# Patient Record
Sex: FEMALE | Race: WHITE | Employment: OTHER | URBAN - METROPOLITAN AREA
[De-identification: names, ages, dates, MRNs, and addresses within clinical notes are randomized per-mention and may not be internally consistent; named-entity substitution may affect disease eponyms.]

---

## 2021-01-06 ENCOUNTER — HOSPITAL ENCOUNTER (EMERGENCY)
Age: 51
Discharge: HOME | End: 2021-01-06
Attending: EMERGENCY MEDICINE
Payer: MEDICAID

## 2021-01-06 VITALS
RESPIRATION RATE: 18 BRPM | HEART RATE: 92 BPM | OXYGEN SATURATION: 98 % | WEIGHT: 293 LBS | TEMPERATURE: 97.7 F | DIASTOLIC BLOOD PRESSURE: 96 MMHG | BODY MASS INDEX: 53.92 KG/M2 | HEIGHT: 62 IN | SYSTOLIC BLOOD PRESSURE: 162 MMHG

## 2021-01-06 DIAGNOSIS — Z20.822 CLOSE EXPOSURE TO COVID-19 VIRUS: Primary | ICD-10-CM

## 2021-01-06 PROCEDURE — 99282 EMERGENCY DEPT VISIT SF MDM: CPT

## 2021-01-06 PROCEDURE — 87635 SARS-COV-2 COVID-19 AMP PRB: CPT

## 2021-01-07 LAB
SARS COV-2, XPGCVT: NEGATIVE
SOURCE, COVRS: NORMAL

## 2021-01-07 NOTE — ED PROVIDER NOTES
Patient is a 48-year-old female with a history of multiple sclerosis. She comes to the ER tonight with her daughter. They are worried about possible Covid infection. Patient states she has had some malaise and fatigue. She was recently around her  who tested positive. She has no cough or shortness of breath. No fever. The history is provided by the patient. Fatigue  This is a new problem. The current episode started 2 days ago. The problem has not changed since onset. There was no focality noted. Pertinent negatives include no focal weakness and no slurred speech. There has been no fever. Pertinent negatives include no shortness of breath, no chest pain, no vomiting, no headaches, no nausea and no bladder incontinence. Associated medical issues do not include trauma, seizures or CVA. No past medical history on file. No past surgical history on file. No family history on file.     Social History     Socioeconomic History    Marital status:      Spouse name: Not on file    Number of children: Not on file    Years of education: Not on file    Highest education level: Not on file   Occupational History    Not on file   Social Needs    Financial resource strain: Not on file    Food insecurity     Worry: Not on file     Inability: Not on file    Transportation needs     Medical: Not on file     Non-medical: Not on file   Tobacco Use    Smoking status: Not on file   Substance and Sexual Activity    Alcohol use: Not on file    Drug use: Not on file    Sexual activity: Not on file   Lifestyle    Physical activity     Days per week: Not on file     Minutes per session: Not on file    Stress: Not on file   Relationships    Social connections     Talks on phone: Not on file     Gets together: Not on file     Attends Faith service: Not on file     Active member of club or organization: Not on file     Attends meetings of clubs or organizations: Not on file     Relationship status: Not on file    Intimate partner violence     Fear of current or ex partner: Not on file     Emotionally abused: Not on file     Physically abused: Not on file     Forced sexual activity: Not on file   Other Topics Concern    Not on file   Social History Narrative    Not on file         ALLERGIES: Patient has no known allergies. Review of Systems   Constitutional: Positive for fatigue. Negative for chills and fever. HENT: Negative for congestion, rhinorrhea and sore throat. Eyes: Negative for pain, discharge and visual disturbance. Respiratory: Negative for cough and shortness of breath. Cardiovascular: Negative for chest pain and palpitations. Gastrointestinal: Negative for abdominal pain, diarrhea, nausea and vomiting. Endocrine: Negative for polydipsia and polyuria. Genitourinary: Negative for bladder incontinence, dysuria, frequency and urgency. Musculoskeletal: Negative for back pain and neck pain. Skin: Negative for rash. Neurological: Negative for focal weakness, seizures, syncope, weakness and headaches. Hematological: Negative. Vitals:    01/06/21 2148   BP: (!) 162/96   Pulse: 92   Resp: 18   Temp: 97.7 °F (36.5 °C)   SpO2: 98%   Weight: 134.3 kg (296 lb)   Height: 5' 2\" (1.575 m)            Physical Exam  Vitals signs and nursing note reviewed. Constitutional:       Appearance: Normal appearance. She is well-developed. HENT:      Head: Normocephalic and atraumatic. Mouth/Throat:      Mouth: Mucous membranes are moist.      Pharynx: Oropharynx is clear. No oropharyngeal exudate. Neck:      Musculoskeletal: Normal range of motion and neck supple. Cardiovascular:      Rate and Rhythm: Normal rate and regular rhythm. Pulmonary:      Effort: Pulmonary effort is normal.      Breath sounds: Normal breath sounds. Abdominal:      Palpations: Abdomen is soft. Tenderness: There is no abdominal tenderness. There is no guarding or rebound. Musculoskeletal: Normal range of motion. General: No tenderness or deformity. Lymphadenopathy:      Cervical: No cervical adenopathy. Skin:     General: Skin is warm and dry. Capillary Refill: Capillary refill takes less than 2 seconds. Findings: No rash. Neurological:      Mental Status: She is alert and oriented to person, place, and time. GCS: GCS eye subscore is 4. GCS verbal subscore is 5. GCS motor subscore is 6. Cranial Nerves: No cranial nerve deficit. Sensory: No sensory deficit. Comments: Weakness from her MS but no acute changes. MDM  Number of Diagnoses or Management Options  Diagnosis management comments: I wore appropriate PPE throughout this patient's ED visit. Kelvin Woodson MD, 11:10 PM    Well-appearing with normal vital signs. No need for admission. We will swab for Covid because of her recent exposure. Voice dictation software was used during the making of this note. This software is not perfect and grammatical and other typographical errors may be present. This note has been proofread, but may still contain errors.   Kelvin Woodson MD; 1/6/2021 @11:10 PM   ===================================================================         Amount and/or Complexity of Data Reviewed  Clinical lab tests: ordered    Risk of Complications, Morbidity, and/or Mortality  Presenting problems: low  Diagnostic procedures: low  Management options: low    Patient Progress  Patient progress: stable         Procedures

## 2021-01-07 NOTE — ED TRIAGE NOTES
Presents to Ed with c/o of headache and bodyaches for approx. 3 days. Pt states that she has been exposed to covid within the last  Week. Mask present on arrival.

## 2021-01-07 NOTE — DISCHARGE INSTRUCTIONS
Use Tylenol for pain and fever. Drink plenty of fluids. Follow-up with your doctor.   You will be called with the results of your Covid swab.    ============

## 2021-01-07 NOTE — ED NOTES
I have reviewed discharge instructions with the patient. The patient verbalized understanding. Patient left ED via Discharge Method: ambulatory to Home with self    Opportunity for questions and clarification provided. Patient given 0 scripts. To continue your aftercare when you leave the hospital, you may receive an automated call from our care team to check in on how you are doing. This is a free service and part of our promise to provide the best care and service to meet your aftercare needs.  If you have questions, or wish to unsubscribe from this service please call [phone]. Thank you for Choosing our [facility] Emergency Department.

## 2021-09-21 ENCOUNTER — APPOINTMENT (OUTPATIENT)
Dept: GENERAL RADIOLOGY | Age: 51
End: 2021-09-21
Attending: STUDENT IN AN ORGANIZED HEALTH CARE EDUCATION/TRAINING PROGRAM
Payer: MEDICAID

## 2021-09-21 ENCOUNTER — HOSPITAL ENCOUNTER (EMERGENCY)
Age: 51
Discharge: HOME | End: 2021-09-21
Attending: EMERGENCY MEDICINE
Payer: MEDICAID

## 2021-09-21 VITALS
TEMPERATURE: 97.3 F | HEART RATE: 78 BPM | WEIGHT: 180 LBS | RESPIRATION RATE: 18 BRPM | HEIGHT: 67 IN | OXYGEN SATURATION: 97 % | DIASTOLIC BLOOD PRESSURE: 82 MMHG | SYSTOLIC BLOOD PRESSURE: 126 MMHG | BODY MASS INDEX: 28.25 KG/M2

## 2021-09-21 DIAGNOSIS — M25.561 ACUTE PAIN OF RIGHT KNEE: Primary | ICD-10-CM

## 2021-09-21 PROCEDURE — 99283 EMERGENCY DEPT VISIT LOW MDM: CPT

## 2021-09-21 PROCEDURE — 73562 X-RAY EXAM OF KNEE 3: CPT

## 2021-09-21 NOTE — ED TRIAGE NOTES
Arrives with face mask in place. Ambulatory into triage with walker. Reports right knee pain r/t injury. States fells Sunday landing on right knee. Hx MS, ambulates with walker. Denies hitting head or loss of consciousness. Denies blood thinners.

## 2021-09-22 NOTE — ED NOTES
I have reviewed discharge instructions with the patient. The patient verbalized understanding. Patient left ED via Discharge Method: ambulatory to Home with self. Opportunity for questions and clarification provided. Patient given 0 scripts. To continue your aftercare when you leave the hospital, you may receive an automated call from our care team to check in on how you are doing. This is a free service and part of our promise to provide the best care and service to meet your aftercare needs.  If you have questions, or wish to unsubscribe from this service please call [phone]. Thank you for Choosing our [facility] Emergency Department.

## 2021-09-22 NOTE — ED PROVIDER NOTES
50-year-old female with past medical history significant for MS, ambulates with a walker presents to the emergency room chief complaint of right knee pain after she had a fall 2 days ago. States she was walking around her house when she lost her balance, landing on her right knee. She states she typically has balance issues and this is related to her MS. She has had right knee pain and swelling since the event. Has not use any medications at home for this. Denies any other symptoms. No past medical history on file. No past surgical history on file. No family history on file. Social History     Socioeconomic History    Marital status:      Spouse name: Not on file    Number of children: Not on file    Years of education: Not on file    Highest education level: Not on file   Occupational History    Not on file   Tobacco Use    Smoking status: Not on file   Substance and Sexual Activity    Alcohol use: Not on file    Drug use: Not on file    Sexual activity: Not on file   Other Topics Concern    Not on file   Social History Narrative    Not on file     Social Determinants of Health     Financial Resource Strain:     Difficulty of Paying Living Expenses:    Food Insecurity:     Worried About Running Out of Food in the Last Year:     [address] in the Last Year:    Transportation Needs:     Lack of Transportation (Medical):      Lack of Transportation (Non-Medical):    Physical Activity:     Days of Exercise per Week:     Minutes of Exercise per Session:    Stress:     Feeling of Stress :    Social Connections:     Frequency of Communication with Friends and Family:     Frequency of Social Gatherings with Friends and Family:     Attends Orthodoxy Services:     Active Member of Clubs or Organizations:     Attends Club or Organization Meetings:     Marital Status:    Intimate Partner Violence:     Fear of Current or Ex-Partner:     Emotionally Abused:     Physically Abused:     Sexually Abused: ALLERGIES: Patient has no known allergies. Review of Systems   Constitutional: Negative for chills and fever. HENT: Negative for facial swelling. Respiratory: Negative for chest tightness and shortness of breath. Cardiovascular: Negative for chest pain. Gastrointestinal: Negative for abdominal pain, nausea and vomiting. Musculoskeletal: Positive for arthralgias. Negative for myalgias. Neurological: Negative for headaches. Psychiatric/Behavioral: Negative for confusion. All other systems reviewed and are negative. Vitals:    09/21/21 1934   BP: 117/75   Pulse: 80   Resp: 16   Temp: 97.3 °F (36.3 °C)   SpO2: 97%   Weight: 81.6 kg (180 lb)   Height: 5' 7\" (1.702 m)            Physical Exam  Vitals and nursing note reviewed. Constitutional:       Appearance: Normal appearance. HENT:      Head: Normocephalic and atraumatic. Mouth/Throat:      Mouth: Mucous membranes are moist.   Eyes:      Pupils: Pupils are equal, round, and reactive to light. Cardiovascular:      Rate and Rhythm: Normal rate and regular rhythm. Pulmonary:      Effort: No respiratory distress. Breath sounds: Normal breath sounds. Abdominal:      Palpations: Abdomen is soft. Tenderness: There is no abdominal tenderness. There is no guarding. Musculoskeletal:      Comments: Mild swelling to the right knee. There is no bony tenderness or crepitus on palpation of the joint. Skin:     General: Skin is warm and dry. Neurological:      Mental Status: She is alert and oriented to person, place, and time. Mental status is at baseline. Psychiatric:         Mood and Affect: Mood normal.          MDM  Number of Diagnoses or Management Options  Diagnosis management comments: 58-year-old female who presents with chief complaint of right knee pain after she had a fall Sunday evening, 2 days ago.   She fell directly onto her right knee, x-ray of the affected region is unremarkable without any bony abnormality, there is no joint effusion present. Advised patient she needs use ice, ibuprofen for pain. She understands indications for which to return to emergency room such as worrisome or worsening symptoms. Voice dictation software was used during the making of this note. This software is not perfect and grammatical and other typographical errors may be present. This note has been proofread, but may still contain errors.    Timmy Chandler PA-C        Amount and/or Complexity of Data Reviewed  Tests in the radiology section of CPT®: ordered and reviewed  Independent visualization of images, tracings, or specimens: yes    Risk of Complications, Morbidity, and/or Mortality  Presenting problems: low  Diagnostic procedures: low  Management options: low    Patient Progress  Patient progress: improved         Procedures

## 2022-06-06 ENCOUNTER — HOSPITAL ENCOUNTER (EMERGENCY)
Age: 52
Discharge: HOME | End: 2022-06-07
Attending: EMERGENCY MEDICINE
Payer: MEDICAID

## 2022-06-06 VITALS
BODY MASS INDEX: 31.39 KG/M2 | HEIGHT: 67 IN | SYSTOLIC BLOOD PRESSURE: 131 MMHG | TEMPERATURE: 98.3 F | WEIGHT: 200 LBS | OXYGEN SATURATION: 96 % | DIASTOLIC BLOOD PRESSURE: 104 MMHG | RESPIRATION RATE: 18 BRPM | HEART RATE: 80 BPM

## 2022-06-06 DIAGNOSIS — R20.2 NUMBNESS AND TINGLING OF BOTH LOWER EXTREMITIES: Primary | ICD-10-CM

## 2022-06-06 DIAGNOSIS — G35 HISTORY OF MULTIPLE SCLEROSIS (HCC): ICD-10-CM

## 2022-06-06 DIAGNOSIS — R20.0 NUMBNESS AND TINGLING OF BOTH LOWER EXTREMITIES: Primary | ICD-10-CM

## 2022-06-06 LAB
BASOPHILS # BLD: 0.1 K/UL (ref 0–0.2)
BASOPHILS NFR BLD: 1 % (ref 0–2)
DIFFERENTIAL METHOD BLD: NORMAL
EOSINOPHIL # BLD: 0.2 K/UL (ref 0–0.8)
EOSINOPHIL NFR BLD: 3 % (ref 0.5–7.8)
ERYTHROCYTE [DISTWIDTH] IN BLOOD BY AUTOMATED COUNT: 13.6 % (ref 11.9–14.6)
HCT VFR BLD AUTO: 41.3 % (ref 35.8–46.3)
HGB BLD-MCNC: 13.3 G/DL (ref 11.7–15.4)
IMM GRANULOCYTES # BLD AUTO: 0 K/UL (ref 0–0.5)
IMM GRANULOCYTES NFR BLD AUTO: 0 % (ref 0–5)
LYMPHOCYTES # BLD: 2 K/UL (ref 0.5–4.6)
LYMPHOCYTES NFR BLD: 30 % (ref 13–44)
MCH RBC QN AUTO: 26.9 PG (ref 26.1–32.9)
MCHC RBC AUTO-ENTMCNC: 32.2 G/DL (ref 31.4–35)
MCV RBC AUTO: 83.4 FL (ref 79.6–97.8)
MONOCYTES # BLD: 0.6 K/UL (ref 0.1–1.3)
MONOCYTES NFR BLD: 8 % (ref 4–12)
NEUTS SEG # BLD: 3.9 K/UL (ref 1.7–8.2)
NEUTS SEG NFR BLD: 58 % (ref 43–78)
NRBC # BLD: 0 K/UL (ref 0–0.2)
PLATELET # BLD AUTO: 264 K/UL (ref 150–450)
PMV BLD AUTO: 9.6 FL (ref 9.4–12.3)
RBC # BLD AUTO: 4.95 M/UL (ref 4.05–5.2)
WBC # BLD AUTO: 6.7 K/UL (ref 4.3–11.1)

## 2022-06-06 PROCEDURE — 99284 EMERGENCY DEPT VISIT MOD MDM: CPT

## 2022-06-06 PROCEDURE — 85025 COMPLETE CBC W/AUTO DIFF WBC: CPT

## 2022-06-06 PROCEDURE — 80053 COMPREHEN METABOLIC PANEL: CPT

## 2022-06-06 PROCEDURE — 6360000002 HC RX W HCPCS: Performed by: EMERGENCY MEDICINE

## 2022-06-06 PROCEDURE — 2580000003 HC RX 258: Performed by: EMERGENCY MEDICINE

## 2022-06-06 PROCEDURE — 96365 THER/PROPH/DIAG IV INF INIT: CPT

## 2022-06-06 RX ORDER — METHYLPREDNISOLONE 32 MG/1
32 TABLET ORAL DAILY
Qty: 7 TABLET | Refills: 0 | Status: SHIPPED | OUTPATIENT
Start: 2022-06-06 | End: 2022-06-13

## 2022-06-06 RX ADMIN — SODIUM CHLORIDE 1000 MG: 9 INJECTION, SOLUTION INTRAVENOUS at 20:58

## 2022-06-06 ASSESSMENT — ENCOUNTER SYMPTOMS
CHEST TIGHTNESS: 0
VOICE CHANGE: 0
CHOKING: 0
VOMITING: 0
BACK PAIN: 0
COLOR CHANGE: 0
NAUSEA: 0
PHOTOPHOBIA: 0

## 2022-06-06 ASSESSMENT — PAIN SCALES - GENERAL: PAINLEVEL_OUTOF10: 10

## 2022-06-06 ASSESSMENT — PAIN DESCRIPTION - LOCATION: LOCATION: LEG

## 2022-06-06 ASSESSMENT — PAIN DESCRIPTION - ORIENTATION: ORIENTATION: LEFT

## 2022-06-06 ASSESSMENT — LIFESTYLE VARIABLES: HOW OFTEN DO YOU HAVE A DRINK CONTAINING ALCOHOL: NEVER

## 2022-06-06 ASSESSMENT — PAIN DESCRIPTION - DESCRIPTORS: DESCRIPTORS: THROBBING

## 2022-06-06 NOTE — ED TRIAGE NOTES
Pt arrives ambulatory to triage. Pt states she was bit by a spider on back of left leg and feels numb all over since then. Started Wednesday. NAD. Masked.

## 2022-06-07 NOTE — ED PROVIDER NOTES
[facility] Emergency Department Provider Note                     PCP:                No primary care provider on file. Age: 47 y.o. Sex: female         No diagnosis found. [unfilled]     [facility]  Number of Diagnoses or Management Options  Diagnosis management comments: Small area of erythema with possibly developing cellulitis. I feel her numbness and weakness are more like related to multiple sclerosis flare      Orders Placed This Encounter   Procedures    CBC with Auto Differential    Comprehensive Metabolic Panel    Insert peripheral IV        Arlin Vigil is a 47 y.o. female who presents to the Emergency Department with chief complaint of    Chief Complaint   Patient presents with    Numbness      Patient presents to the ER with family concerns over insect bite as well as numbness. Patient states she sustained a possible spider bite to her left leg a couple days ago. Was seen at outside ER and placed on antibiotics. Where she reports she has had increased numbness to left leg as well as right leg. States she has had issues with her balance and \"feeling weakness as well. Denies any headache. History of multiple sclerosis, states she has not been able to get in with her neurologist.    The history is provided by the patient. Fatigue  Severity:  Moderate  Timing:  Constant  Context: recent infection    Context: not alcohol use, not dehydration and not urinary tract infection    Relieved by:  Nothing  Worsened by:  Nothing  Associated symptoms: difficulty walking    Associated symptoms: no chest pain, no dizziness, no drooling, no dysphagia, no fever, no lethargy, no myalgias, no nausea, no stroke symptoms, no urgency and no vomiting        Review of Systems   Constitutional: Positive for fatigue. Negative for fever. HENT: Negative for drooling and voice change. Eyes: Negative for photophobia and visual disturbance. Respiratory: Negative for choking and chest tightness. Cardiovascular: Negative for chest pain and leg swelling. Gastrointestinal: Negative for dysphagia, nausea and vomiting. Endocrine: Negative for polyphagia and polyuria. Genitourinary: Negative for urgency. Musculoskeletal: Negative for back pain, gait problem and myalgias. Skin: Negative for color change and pallor. Neurological: Positive for weakness. Negative for dizziness and tremors. Hematological: Negative for adenopathy. Does not bruise/bleed easily. Psychiatric/Behavioral: Negative for behavioral problems and confusion. All other systems reviewed and are negative. All other systems reviewed and are negative. @[email or web address]     @[email or web address]    @[email or web address]        Social Connections:     Frequency of Communication with Friends and Family: Not on file    Frequency of Social Gatherings with Friends and Family: Not on file    Attends Restorationism Services: Not on file    Active Member of Clubs or Organizations: Not on file    Attends Club or Organization Meetings: Not on file    Marital Status: Not on file        No Known Allergies     Vitals signs and nursing note reviewed. Patient Vitals for the past 4 hrs:   Temp Pulse Resp BP SpO2   06/06/22 2016 -- -- -- -- 96 %   06/06/22 2015 -- -- -- (!) 131/104 97 %   06/06/22 1830 98.3 °F (36.8 °C) 80 18 (!) 126/90 96 %          Physical Exam  Vitals and nursing note reviewed. Constitutional:       General: She is not in acute distress. Appearance: Normal appearance. She is not ill-appearing. HENT:      Head: Normocephalic and atraumatic. Right Ear: External ear normal.      Left Ear: External ear normal.   Eyes:      Extraocular Movements: Extraocular movements intact. Pupils: Pupils are equal, round, and reactive to light. Cardiovascular:      Rate and Rhythm: Normal rate and regular rhythm. Pulses: Normal pulses. Heart sounds: Normal heart sounds.    Pulmonary:      Effort: Pulmonary effort is normal. No respiratory distress. Breath sounds: Normal breath sounds. No stridor. Abdominal:      General: Abdomen is flat. There is no distension. Palpations: There is no mass. Musculoskeletal:         General: No swelling or tenderness. Normal range of motion. Cervical back: Normal range of motion and neck supple. Legs:    Skin:     General: Skin is warm. Capillary Refill: Capillary refill takes less than 2 seconds. Coloration: Skin is not jaundiced or pale. Findings: Erythema present. No bruising. Neurological:      General: No focal deficit present. Mental Status: She is alert and oriented to person, place, and time. Cranial Nerves: No cranial nerve deficit. Sensory: No sensory deficit. Psychiatric:         Mood and Affect: Mood normal.         Behavior: Behavior normal.              Procedures    [unfilled]     No orders to display         Voice dictation software was used during the making of this note. This software is not perfect and grammatical and other typographical errors may be present. This note has not been completely proofread for errors.         Bailee Nobles MD  06/06/22 0272

## 2022-06-12 LAB
ALBUMIN SERPL-MCNC: 3.8 G/DL (ref 3.5–5)
ALBUMIN/GLOB SERPL: 1.2 {RATIO} (ref 1.2–3.5)
ALP SERPL-CCNC: 72 U/L (ref 50–136)
ALT SERPL-CCNC: 22 U/L (ref 12–65)
ANION GAP SERPL CALC-SCNC: 10 MMOL/L (ref 7–16)
AST SERPL-CCNC: 21 U/L (ref 15–37)
BILIRUB SERPL-MCNC: 0.5 MG/DL (ref 0.2–1.1)
BUN SERPL-MCNC: 8 MG/DL (ref 6–23)
CALCIUM SERPL-MCNC: 9.4 MG/DL (ref 8.3–10.4)
CHLORIDE SERPL-SCNC: 110 MMOL/L (ref 98–107)
CO2 SERPL-SCNC: 24 MMOL/L (ref 21–32)
CREAT SERPL-MCNC: 0.86 MG/DL (ref 0.6–1)
GLOBULIN SER CALC-MCNC: 3.2 G/DL (ref 2.3–3.5)
GLUCOSE SERPL-MCNC: 104 MG/DL (ref 65–100)
POTASSIUM SERPL-SCNC: 3.9 MMOL/L (ref 3.5–5.1)
PROT SERPL-MCNC: 7 G/DL (ref 6.3–8.2)
SODIUM SERPL-SCNC: 144 MMOL/L (ref 136–145)

## 2024-01-21 ENCOUNTER — HOSPITAL ENCOUNTER (EMERGENCY)
Age: 54
Discharge: HOME | End: 2024-01-21
Attending: EMERGENCY MEDICINE
Payer: MEDICAID

## 2024-01-21 ENCOUNTER — APPOINTMENT (OUTPATIENT)
Dept: GENERAL RADIOLOGY | Age: 54
End: 2024-01-21
Payer: MEDICAID

## 2024-01-21 ENCOUNTER — APPOINTMENT (OUTPATIENT)
Dept: CT IMAGING | Age: 54
End: 2024-01-21
Payer: MEDICAID

## 2024-01-21 VITALS
RESPIRATION RATE: 18 BRPM | HEIGHT: 67 IN | HEART RATE: 78 BPM | BODY MASS INDEX: 31.39 KG/M2 | WEIGHT: 200 LBS | DIASTOLIC BLOOD PRESSURE: 81 MMHG | OXYGEN SATURATION: 98 % | SYSTOLIC BLOOD PRESSURE: 139 MMHG | TEMPERATURE: 98.2 F

## 2024-01-21 DIAGNOSIS — R20.0 NUMBNESS: Primary | ICD-10-CM

## 2024-01-21 DIAGNOSIS — G35 MULTIPLE SCLEROSIS (HCC): ICD-10-CM

## 2024-01-21 DIAGNOSIS — N39.0 URINARY TRACT INFECTION WITHOUT HEMATURIA, SITE UNSPECIFIED: ICD-10-CM

## 2024-01-21 LAB
ALBUMIN SERPL-MCNC: 3.5 G/DL (ref 3.5–5)
ALBUMIN/GLOB SERPL: 1.2 (ref 0.4–1.6)
ALP SERPL-CCNC: 79 U/L (ref 50–136)
ALT SERPL-CCNC: 20 U/L (ref 12–65)
ANION GAP SERPL CALC-SCNC: 6 MMOL/L (ref 2–11)
APPEARANCE UR: CLEAR
AST SERPL-CCNC: 14 U/L (ref 15–37)
BACTERIA URNS QL MICRO: NEGATIVE /HPF
BASOPHILS # BLD: 0.1 K/UL (ref 0–0.2)
BASOPHILS NFR BLD: 1 % (ref 0–2)
BILIRUB SERPL-MCNC: 1 MG/DL (ref 0.2–1.1)
BILIRUB UR QL: NEGATIVE
BUN SERPL-MCNC: 13 MG/DL (ref 6–23)
CALCIUM SERPL-MCNC: 8.7 MG/DL (ref 8.3–10.4)
CASTS URNS QL MICRO: ABNORMAL /LPF
CHLORIDE SERPL-SCNC: 110 MMOL/L (ref 103–113)
CO2 SERPL-SCNC: 23 MMOL/L (ref 21–32)
COLOR UR: ABNORMAL
CREAT SERPL-MCNC: 1 MG/DL (ref 0.6–1)
DIFFERENTIAL METHOD BLD: ABNORMAL
EKG ATRIAL RATE: 80 BPM
EKG DIAGNOSIS: NORMAL
EKG P AXIS: 70 DEGREES
EKG P-R INTERVAL: 140 MS
EKG Q-T INTERVAL: 380 MS
EKG QRS DURATION: 87 MS
EKG QTC CALCULATION (BAZETT): 439 MS
EKG R AXIS: -74 DEGREES
EKG T AXIS: 24 DEGREES
EKG VENTRICULAR RATE: 80 BPM
EOSINOPHIL # BLD: 0.1 K/UL (ref 0–0.8)
EOSINOPHIL NFR BLD: 2 % (ref 0.5–7.8)
EPI CELLS #/AREA URNS HPF: ABNORMAL /HPF
ERYTHROCYTE [DISTWIDTH] IN BLOOD BY AUTOMATED COUNT: 13.2 % (ref 11.9–14.6)
GLOBULIN SER CALC-MCNC: 2.9 G/DL (ref 2.8–4.5)
GLUCOSE SERPL-MCNC: 106 MG/DL (ref 65–100)
GLUCOSE UR STRIP.AUTO-MCNC: NEGATIVE MG/DL
HCT VFR BLD AUTO: 44.4 % (ref 35.8–46.3)
HGB BLD-MCNC: 14.5 G/DL (ref 11.7–15.4)
HGB UR QL STRIP: NEGATIVE
IMM GRANULOCYTES # BLD AUTO: 0 K/UL (ref 0–0.5)
IMM GRANULOCYTES NFR BLD AUTO: 0 % (ref 0–5)
KETONES UR QL STRIP.AUTO: NEGATIVE MG/DL
LEUKOCYTE ESTERASE UR QL STRIP.AUTO: ABNORMAL
LYMPHOCYTES # BLD: 1.3 K/UL (ref 0.5–4.6)
LYMPHOCYTES NFR BLD: 19 % (ref 13–44)
MAGNESIUM SERPL-MCNC: 2.1 MG/DL (ref 1.8–2.4)
MCH RBC QN AUTO: 27.5 PG (ref 26.1–32.9)
MCHC RBC AUTO-ENTMCNC: 32.7 G/DL (ref 31.4–35)
MCV RBC AUTO: 84.3 FL (ref 82–102)
MONOCYTES # BLD: 0.6 K/UL (ref 0.1–1.3)
MONOCYTES NFR BLD: 9 % (ref 4–12)
NEUTS SEG # BLD: 4.5 K/UL (ref 1.7–8.2)
NEUTS SEG NFR BLD: 69 % (ref 43–78)
NITRITE UR QL STRIP.AUTO: NEGATIVE
NRBC # BLD: 0 K/UL (ref 0–0.2)
PH UR STRIP: 6 (ref 5–9)
PLATELET # BLD AUTO: 221 K/UL (ref 150–450)
PMV BLD AUTO: 9.2 FL (ref 9.4–12.3)
POTASSIUM SERPL-SCNC: 3.9 MMOL/L (ref 3.5–5.1)
PROT SERPL-MCNC: 6.4 G/DL (ref 6.3–8.2)
PROT UR STRIP-MCNC: NEGATIVE MG/DL
RBC # BLD AUTO: 5.27 M/UL (ref 4.05–5.2)
RBC #/AREA URNS HPF: ABNORMAL /HPF
SODIUM SERPL-SCNC: 139 MMOL/L (ref 136–146)
SP GR UR REFRACTOMETRY: 1.01 (ref 1–1.02)
UROBILINOGEN UR QL STRIP.AUTO: 1 EU/DL (ref 0.2–1)
VIT B12 SERPL-MCNC: 265 PG/ML (ref 193–986)
WBC # BLD AUTO: 6.5 K/UL (ref 4.3–11.1)
WBC URNS QL MICRO: ABNORMAL /HPF

## 2024-01-21 PROCEDURE — 85025 COMPLETE CBC W/AUTO DIFF WBC: CPT

## 2024-01-21 PROCEDURE — 93010 ELECTROCARDIOGRAM REPORT: CPT | Performed by: INTERNAL MEDICINE

## 2024-01-21 PROCEDURE — 82607 VITAMIN B-12: CPT

## 2024-01-21 PROCEDURE — 81001 URINALYSIS AUTO W/SCOPE: CPT

## 2024-01-21 PROCEDURE — 83735 ASSAY OF MAGNESIUM: CPT

## 2024-01-21 PROCEDURE — 80053 COMPREHEN METABOLIC PANEL: CPT

## 2024-01-21 PROCEDURE — 93005 ELECTROCARDIOGRAM TRACING: CPT | Performed by: EMERGENCY MEDICINE

## 2024-01-21 PROCEDURE — 71045 X-RAY EXAM CHEST 1 VIEW: CPT

## 2024-01-21 PROCEDURE — 70450 CT HEAD/BRAIN W/O DYE: CPT

## 2024-01-21 PROCEDURE — 99285 EMERGENCY DEPT VISIT HI MDM: CPT

## 2024-01-21 RX ORDER — METHYLPREDNISOLONE 4 MG/1
TABLET ORAL
Qty: 1 KIT | Refills: 0 | Status: SHIPPED | OUTPATIENT
Start: 2024-01-21 | End: 2024-01-27

## 2024-01-21 RX ORDER — NITROFURANTOIN 25; 75 MG/1; MG/1
100 CAPSULE ORAL 2 TIMES DAILY
Qty: 10 CAPSULE | Refills: 0 | Status: SHIPPED | OUTPATIENT
Start: 2024-01-21 | End: 2024-01-26

## 2024-01-21 ASSESSMENT — PAIN DESCRIPTION - LOCATION: LOCATION: GENERALIZED

## 2024-01-21 ASSESSMENT — LIFESTYLE VARIABLES
HOW MANY STANDARD DRINKS CONTAINING ALCOHOL DO YOU HAVE ON A TYPICAL DAY: PATIENT DOES NOT DRINK
HOW OFTEN DO YOU HAVE A DRINK CONTAINING ALCOHOL: NEVER

## 2024-01-21 ASSESSMENT — PAIN - FUNCTIONAL ASSESSMENT: PAIN_FUNCTIONAL_ASSESSMENT: 0-10

## 2024-01-21 ASSESSMENT — PAIN DESCRIPTION - DESCRIPTORS: DESCRIPTORS: TIGHTNESS

## 2024-01-21 NOTE — ED NOTES
Pt had difficulty with ambulation. States she usually has to use wheelchair and is unable to ambulate for long distances at baseline. Provider aware.      Davina Zuniga RN  01/21/24 6588

## 2024-01-21 NOTE — ED TRIAGE NOTES
Pt arrives via [facility] coming from home with c/o generalized numbness, onset \"about a week or two\". Has hx of MS and started on steroids approximately one week ago r/t numbness. States she fell yesterday while walking but denies any injury or complaint from fall. VS: /76, HR 92, SaO2 100% RR 18, BGL 95, Temp 98. Pt endorses \"spots\" in her vision and imbalance.

## 2024-01-21 NOTE — ED PROVIDER NOTES
atraumatic.      Right Ear: External ear normal.      Left Ear: External ear normal.      Mouth/Throat:      Mouth: Mucous membranes are moist.      Pharynx: Oropharynx is clear.   Eyes:      General: No scleral icterus.     Extraocular Movements: Extraocular movements intact.      Pupils: Pupils are equal, round, and reactive to light.   Cardiovascular:      Rate and Rhythm: Normal rate and regular rhythm.   Pulmonary:      Effort: Pulmonary effort is normal.      Breath sounds: Normal breath sounds.   Abdominal:      Palpations: Abdomen is soft.      Tenderness: There is no abdominal tenderness.   Musculoskeletal:         General: No swelling or tenderness.      Cervical back: Normal range of motion and neck supple.      Right lower leg: No edema.      Left lower leg: No edema.   Skin:     General: Skin is warm and dry.   Neurological:      General: No focal deficit present.      Mental Status: She is alert.      Comments: No facial asymmetry.  No drift.  Equal .  Speech without dysarthria          Procedures     Procedures    Orders Placed This Encounter   Procedures    XR CHEST PORTABLE    CT HEAD WO CONTRAST    Comprehensive Metabolic Panel    Urinalysis    Magnesium    CBC with Auto Differential    Vitamin B12    NIH Stroke Scale    NIHSS    Ambulate patient    EKG 12 Lead    Insert peripheral IV        Medications given during this emergency department visit:  Medications - No data to display    New Prescriptions    METHYLPREDNISOLONE (MEDROL, CURLY,) 4 MG TABLET    Take by mouth as directed on the package.    NITROFURANTOIN, MACROCRYSTAL-MONOHYDRATE, (MACROBID) 100 MG CAPSULE    Take 1 capsule by mouth 2 times daily for 5 days        No past medical history on file.     No past surgical history on file.     Social History     Socioeconomic History    Marital status:         Previous Medications    No medications on file        Results for orders placed or performed during the hospital encounter of

## 2024-01-21 NOTE — DISCHARGE INSTRUCTIONS
Your CAT scan is reassuring.  Your MRI earlier this month is reassuring.  Take antibiotic until complete.  Keep appointment with your neurologist.  Drink plenty fluids.  Recheck for worse or worrisome symptoms.

## 2024-04-17 ENCOUNTER — APPOINTMENT (OUTPATIENT)
Dept: MRI IMAGING | Age: 54
DRG: 321 | End: 2024-04-17
Payer: MEDICAID

## 2024-04-17 ENCOUNTER — HOSPITAL ENCOUNTER (INPATIENT)
Age: 54
LOS: 13 days | Discharge: NURSING, REHAB OR LONG-TERM CARE | DRG: 321 | End: 2024-04-30
Attending: EMERGENCY MEDICINE | Admitting: HOSPITALIST
Payer: MEDICAID

## 2024-04-17 ENCOUNTER — APPOINTMENT (OUTPATIENT)
Dept: GENERAL RADIOLOGY | Age: 54
DRG: 321 | End: 2024-04-17
Payer: MEDICAID

## 2024-04-17 DIAGNOSIS — R15.9 INCONTINENCE OF FECES, UNSPECIFIED FECAL INCONTINENCE TYPE: ICD-10-CM

## 2024-04-17 DIAGNOSIS — F41.9 ANXIETY: ICD-10-CM

## 2024-04-17 DIAGNOSIS — M54.2 NECK PAIN: Primary | ICD-10-CM

## 2024-04-17 DIAGNOSIS — R29.898 WEAKNESS OF BOTH LOWER EXTREMITIES: ICD-10-CM

## 2024-04-17 DIAGNOSIS — M48.02 SPINAL STENOSIS OF CERVICAL REGION: ICD-10-CM

## 2024-04-17 PROBLEM — Z98.2 STATUS POST VENTRICULO-PERITONEAL SHUNT PLACEMENT: Status: ACTIVE | Noted: 2024-04-17

## 2024-04-17 PROBLEM — G91.2 NPH (NORMAL PRESSURE HYDROCEPHALUS) (HCC): Status: ACTIVE | Noted: 2024-04-17

## 2024-04-17 PROBLEM — G95.9 CERVICAL MYELOPATHY (HCC): Status: ACTIVE | Noted: 2024-04-17

## 2024-04-17 PROBLEM — G37.3 TRANSVERSE MYELITIS (HCC): Status: ACTIVE | Noted: 2024-04-17

## 2024-04-17 PROBLEM — G35 MULTIPLE SCLEROSIS (HCC): Status: ACTIVE | Noted: 2024-04-17

## 2024-04-17 LAB
ANION GAP SERPL CALC-SCNC: 4 MMOL/L (ref 2–11)
BACTERIA URNS QL MICRO: NEGATIVE /HPF
BASOPHILS # BLD: 0.1 K/UL (ref 0–0.2)
BASOPHILS NFR BLD: 1 % (ref 0–2)
BILIRUB UR QL: NEGATIVE
BUN SERPL-MCNC: 15 MG/DL (ref 6–23)
CALCIUM SERPL-MCNC: 9.6 MG/DL (ref 8.3–10.4)
CASTS URNS QL MICRO: ABNORMAL /LPF
CHLORIDE SERPL-SCNC: 110 MMOL/L (ref 103–113)
CO2 SERPL-SCNC: 28 MMOL/L (ref 21–32)
CREAT SERPL-MCNC: 0.7 MG/DL (ref 0.6–1)
DIFFERENTIAL METHOD BLD: ABNORMAL
EOSINOPHIL # BLD: 0.1 K/UL (ref 0–0.8)
EOSINOPHIL NFR BLD: 2 % (ref 0.5–7.8)
EPI CELLS #/AREA URNS HPF: ABNORMAL /HPF
ERYTHROCYTE [DISTWIDTH] IN BLOOD BY AUTOMATED COUNT: 14.2 % (ref 11.9–14.6)
GLUCOSE SERPL-MCNC: 94 MG/DL (ref 65–100)
GLUCOSE UR QL STRIP.AUTO: NEGATIVE MG/DL
HCT VFR BLD AUTO: 41.4 % (ref 35.8–46.3)
HGB BLD-MCNC: 13.4 G/DL (ref 11.7–15.4)
IMM GRANULOCYTES # BLD AUTO: 0 K/UL (ref 0–0.5)
IMM GRANULOCYTES NFR BLD AUTO: 0 % (ref 0–5)
KETONES UR-MCNC: NEGATIVE MG/DL
LEUKOCYTE ESTERASE UR QL STRIP: ABNORMAL
LYMPHOCYTES # BLD: 1.1 K/UL (ref 0.5–4.6)
LYMPHOCYTES NFR BLD: 15 % (ref 13–44)
MCH RBC QN AUTO: 27.6 PG (ref 26.1–32.9)
MCHC RBC AUTO-ENTMCNC: 32.4 G/DL (ref 31.4–35)
MCV RBC AUTO: 85.2 FL (ref 82–102)
MONOCYTES # BLD: 0.8 K/UL (ref 0.1–1.3)
MONOCYTES NFR BLD: 11 % (ref 4–12)
NEUTS SEG # BLD: 5.3 K/UL (ref 1.7–8.2)
NEUTS SEG NFR BLD: 71 % (ref 43–78)
NITRITE UR QL: NEGATIVE
NRBC # BLD: 0 K/UL (ref 0–0.2)
PH UR: 7 (ref 5–9)
PLATELET # BLD AUTO: 246 K/UL (ref 150–450)
PMV BLD AUTO: 9 FL (ref 9.4–12.3)
POTASSIUM SERPL-SCNC: 3.8 MMOL/L (ref 3.5–5.1)
PROT UR QL: NEGATIVE MG/DL
RBC # BLD AUTO: 4.86 M/UL (ref 4.05–5.2)
RBC # UR STRIP: NEGATIVE
RBC #/AREA URNS HPF: ABNORMAL /HPF
SERVICE CMNT-IMP: ABNORMAL
SODIUM SERPL-SCNC: 142 MMOL/L (ref 136–146)
SP GR UR: 1.01 (ref 1–1.02)
UROBILINOGEN UR QL: 1 EU/DL (ref 0.2–1)
WBC # BLD AUTO: 7.4 K/UL (ref 4.3–11.1)
WBC URNS QL MICRO: ABNORMAL /HPF

## 2024-04-17 PROCEDURE — 80048 BASIC METABOLIC PNL TOTAL CA: CPT

## 2024-04-17 PROCEDURE — 96374 THER/PROPH/DIAG INJ IV PUSH: CPT

## 2024-04-17 PROCEDURE — 6370000000 HC RX 637 (ALT 250 FOR IP): Performed by: HOSPITALIST

## 2024-04-17 PROCEDURE — 2580000003 HC RX 258: Performed by: HOSPITALIST

## 2024-04-17 PROCEDURE — 22600 ARTHRD PST TQ 1NTRSPC CRV: CPT | Performed by: ORTHOPAEDIC SURGERY

## 2024-04-17 PROCEDURE — 20930 SP BONE ALGRFT MORSEL ADD-ON: CPT | Performed by: ORTHOPAEDIC SURGERY

## 2024-04-17 PROCEDURE — 22614 ARTHRD PST TQ 1NTRSPC EA ADD: CPT | Performed by: ORTHOPAEDIC SURGERY

## 2024-04-17 PROCEDURE — 99285 EMERGENCY DEPT VISIT HI MDM: CPT

## 2024-04-17 PROCEDURE — 72141 MRI NECK SPINE W/O DYE: CPT

## 2024-04-17 PROCEDURE — 00NW0ZZ RELEASE CERVICAL SPINAL CORD, OPEN APPROACH: ICD-10-PCS | Performed by: ORTHOPAEDIC SURGERY

## 2024-04-17 PROCEDURE — 6360000002 HC RX W HCPCS: Performed by: EMERGENCY MEDICINE

## 2024-04-17 PROCEDURE — 0RG20K1 FUSION OF 2 OR MORE CERVICAL VERTEBRAL JOINTS WITH NONAUTOLOGOUS TISSUE SUBSTITUTE, POSTERIOR APPROACH, POSTERIOR COLUMN, OPEN APPROACH: ICD-10-PCS | Performed by: ORTHOPAEDIC SURGERY

## 2024-04-17 PROCEDURE — 6360000002 HC RX W HCPCS: Performed by: HOSPITALIST

## 2024-04-17 PROCEDURE — 96375 TX/PRO/DX INJ NEW DRUG ADDON: CPT

## 2024-04-17 PROCEDURE — 81015 MICROSCOPIC EXAM OF URINE: CPT

## 2024-04-17 PROCEDURE — 1100000000 HC RM PRIVATE

## 2024-04-17 PROCEDURE — 63015 REMOVE SPINE LAMINA >2 CRVCL: CPT | Performed by: ORTHOPAEDIC SURGERY

## 2024-04-17 PROCEDURE — 72050 X-RAY EXAM NECK SPINE 4/5VWS: CPT

## 2024-04-17 PROCEDURE — 22842 INSERT SPINE FIXATION DEVICE: CPT | Performed by: ORTHOPAEDIC SURGERY

## 2024-04-17 PROCEDURE — 2580000003 HC RX 258: Performed by: EMERGENCY MEDICINE

## 2024-04-17 PROCEDURE — 81003 URINALYSIS AUTO W/O SCOPE: CPT

## 2024-04-17 PROCEDURE — 4A11X4G MONITORING OF PERIPHERAL NERVOUS ELECTRICAL ACTIVITY, INTRAOPERATIVE, EXTERNAL APPROACH: ICD-10-PCS | Performed by: ORTHOPAEDIC SURGERY

## 2024-04-17 PROCEDURE — A4216 STERILE WATER/SALINE, 10 ML: HCPCS | Performed by: EMERGENCY MEDICINE

## 2024-04-17 PROCEDURE — 85025 COMPLETE CBC W/AUTO DIFF WBC: CPT

## 2024-04-17 RX ORDER — OXYBUTYNIN CHLORIDE 10 MG/1
10 TABLET, EXTENDED RELEASE ORAL NIGHTLY
Status: DISCONTINUED | OUTPATIENT
Start: 2024-04-17 | End: 2024-04-30 | Stop reason: HOSPADM

## 2024-04-17 RX ORDER — FAMOTIDINE 20 MG/1
20 TABLET, FILM COATED ORAL 2 TIMES DAILY
COMMUNITY

## 2024-04-17 RX ORDER — TRAZODONE HYDROCHLORIDE 150 MG/1
150 TABLET ORAL NIGHTLY
COMMUNITY

## 2024-04-17 RX ORDER — FAMOTIDINE 20 MG/1
20 TABLET, FILM COATED ORAL 2 TIMES DAILY
Status: DISCONTINUED | OUTPATIENT
Start: 2024-04-17 | End: 2024-04-30 | Stop reason: HOSPADM

## 2024-04-17 RX ORDER — LANOLIN ALCOHOL/MO/W.PET/CERES
5 CREAM (GRAM) TOPICAL NIGHTLY PRN
COMMUNITY

## 2024-04-17 RX ORDER — GABAPENTIN 300 MG/1
300 CAPSULE ORAL 3 TIMES DAILY
Status: ON HOLD | COMMUNITY
End: 2024-04-30

## 2024-04-17 RX ORDER — TRAZODONE HYDROCHLORIDE 50 MG/1
150 TABLET ORAL NIGHTLY
Status: DISCONTINUED | OUTPATIENT
Start: 2024-04-17 | End: 2024-04-30 | Stop reason: HOSPADM

## 2024-04-17 RX ORDER — MORPHINE SULFATE 4 MG/ML
4 INJECTION INTRAVENOUS ONCE
Status: COMPLETED | OUTPATIENT
Start: 2024-04-17 | End: 2024-04-17

## 2024-04-17 RX ORDER — ONDANSETRON 2 MG/ML
4 INJECTION INTRAMUSCULAR; INTRAVENOUS EVERY 4 HOURS PRN
Status: DISCONTINUED | OUTPATIENT
Start: 2024-04-17 | End: 2024-04-30 | Stop reason: HOSPADM

## 2024-04-17 RX ORDER — GABAPENTIN 300 MG/1
300 CAPSULE ORAL 3 TIMES DAILY
Status: DISCONTINUED | OUTPATIENT
Start: 2024-04-17 | End: 2024-04-30 | Stop reason: HOSPADM

## 2024-04-17 RX ORDER — ACETAMINOPHEN 650 MG/1
650 SUPPOSITORY RECTAL EVERY 6 HOURS PRN
Status: DISCONTINUED | OUTPATIENT
Start: 2024-04-17 | End: 2024-04-30 | Stop reason: HOSPADM

## 2024-04-17 RX ORDER — HYDROCODONE BITARTRATE AND ACETAMINOPHEN 7.5; 325 MG/1; MG/1
1 TABLET ORAL EVERY 4 HOURS PRN
Status: DISCONTINUED | OUTPATIENT
Start: 2024-04-17 | End: 2024-04-30 | Stop reason: HOSPADM

## 2024-04-17 RX ORDER — FLUOXETINE HYDROCHLORIDE 20 MG/1
20 CAPSULE ORAL DAILY
Status: DISCONTINUED | OUTPATIENT
Start: 2024-04-18 | End: 2024-04-30 | Stop reason: HOSPADM

## 2024-04-17 RX ORDER — POTASSIUM CHLORIDE 20 MEQ/1
40 TABLET, EXTENDED RELEASE ORAL PRN
Status: DISCONTINUED | OUTPATIENT
Start: 2024-04-17 | End: 2024-04-30 | Stop reason: HOSPADM

## 2024-04-17 RX ORDER — SENNA AND DOCUSATE SODIUM 50; 8.6 MG/1; MG/1
1 TABLET, FILM COATED ORAL 2 TIMES DAILY
Status: DISCONTINUED | OUTPATIENT
Start: 2024-04-17 | End: 2024-04-23

## 2024-04-17 RX ORDER — LANOLIN ALCOHOL/MO/W.PET/CERES
3 CREAM (GRAM) TOPICAL NIGHTLY
Status: DISCONTINUED | OUTPATIENT
Start: 2024-04-17 | End: 2024-04-30 | Stop reason: HOSPADM

## 2024-04-17 RX ORDER — POTASSIUM CHLORIDE 7.45 MG/ML
10 INJECTION INTRAVENOUS PRN
Status: DISCONTINUED | OUTPATIENT
Start: 2024-04-17 | End: 2024-04-30 | Stop reason: HOSPADM

## 2024-04-17 RX ORDER — ACETAMINOPHEN 325 MG/1
650 TABLET ORAL EVERY 6 HOURS PRN
COMMUNITY

## 2024-04-17 RX ORDER — SODIUM CHLORIDE 9 MG/ML
INJECTION, SOLUTION INTRAVENOUS CONTINUOUS
Status: ACTIVE | OUTPATIENT
Start: 2024-04-17 | End: 2024-04-18

## 2024-04-17 RX ORDER — SODIUM CHLORIDE 0.9 % (FLUSH) 0.9 %
5-40 SYRINGE (ML) INJECTION PRN
Status: DISCONTINUED | OUTPATIENT
Start: 2024-04-17 | End: 2024-04-30 | Stop reason: HOSPADM

## 2024-04-17 RX ORDER — KETOROLAC TROMETHAMINE 30 MG/ML
30 INJECTION, SOLUTION INTRAMUSCULAR; INTRAVENOUS EVERY 6 HOURS PRN
Status: DISCONTINUED | OUTPATIENT
Start: 2024-04-17 | End: 2024-04-18

## 2024-04-17 RX ORDER — SODIUM CHLORIDE 9 MG/ML
INJECTION, SOLUTION INTRAVENOUS PRN
Status: DISCONTINUED | OUTPATIENT
Start: 2024-04-17 | End: 2024-04-30 | Stop reason: HOSPADM

## 2024-04-17 RX ORDER — POLYETHYLENE GLYCOL 3350 17 G/17G
17 POWDER, FOR SOLUTION ORAL DAILY PRN
Status: DISCONTINUED | OUTPATIENT
Start: 2024-04-17 | End: 2024-04-20

## 2024-04-17 RX ORDER — OXYBUTYNIN CHLORIDE 10 MG/1
10 TABLET, EXTENDED RELEASE ORAL DAILY
COMMUNITY

## 2024-04-17 RX ORDER — ACETAMINOPHEN 325 MG/1
650 TABLET ORAL EVERY 4 HOURS PRN
Status: DISCONTINUED | OUTPATIENT
Start: 2024-04-17 | End: 2024-04-30 | Stop reason: HOSPADM

## 2024-04-17 RX ORDER — FLUOXETINE HYDROCHLORIDE 20 MG/1
20 CAPSULE ORAL DAILY
COMMUNITY

## 2024-04-17 RX ORDER — ENOXAPARIN SODIUM 100 MG/ML
40 INJECTION SUBCUTANEOUS EVERY 24 HOURS
Status: DISCONTINUED | OUTPATIENT
Start: 2024-04-18 | End: 2024-04-18

## 2024-04-17 RX ORDER — PROMETHAZINE HYDROCHLORIDE 12.5 MG/1
12.5 TABLET ORAL EVERY 6 HOURS PRN
Status: DISCONTINUED | OUTPATIENT
Start: 2024-04-17 | End: 2024-04-30 | Stop reason: HOSPADM

## 2024-04-17 RX ORDER — SODIUM CHLORIDE 0.9 % (FLUSH) 0.9 %
5-40 SYRINGE (ML) INJECTION EVERY 12 HOURS SCHEDULED
Status: DISCONTINUED | OUTPATIENT
Start: 2024-04-17 | End: 2024-04-30 | Stop reason: HOSPADM

## 2024-04-17 RX ORDER — DEXAMETHASONE SODIUM PHOSPHATE 10 MG/ML
8 INJECTION INTRAMUSCULAR; INTRAVENOUS EVERY 8 HOURS
Status: DISPENSED | OUTPATIENT
Start: 2024-04-17 | End: 2024-04-19

## 2024-04-17 RX ORDER — MAGNESIUM SULFATE IN WATER 40 MG/ML
2000 INJECTION, SOLUTION INTRAVENOUS PRN
Status: DISCONTINUED | OUTPATIENT
Start: 2024-04-17 | End: 2024-04-30 | Stop reason: HOSPADM

## 2024-04-17 RX ADMIN — DEXAMETHASONE SODIUM PHOSPHATE 8 MG: 10 INJECTION INTRAMUSCULAR; INTRAVENOUS at 21:15

## 2024-04-17 RX ADMIN — FAMOTIDINE 20 MG: 20 TABLET, FILM COATED ORAL at 20:56

## 2024-04-17 RX ADMIN — GABAPENTIN 300 MG: 300 CAPSULE ORAL at 20:56

## 2024-04-17 RX ADMIN — OXYBUTYNIN CHLORIDE 10 MG: 10 TABLET, EXTENDED RELEASE ORAL at 20:56

## 2024-04-17 RX ADMIN — SODIUM CHLORIDE: 9 INJECTION, SOLUTION INTRAVENOUS at 20:35

## 2024-04-17 RX ADMIN — MORPHINE SULFATE 4 MG: 4 INJECTION INTRAVENOUS at 11:47

## 2024-04-17 RX ADMIN — Medication 3 MG: at 20:56

## 2024-04-17 RX ADMIN — SODIUM CHLORIDE 1 MG: 9 INJECTION, SOLUTION INTRAMUSCULAR; INTRAVENOUS; SUBCUTANEOUS at 15:34

## 2024-04-17 RX ADMIN — TRAZODONE HYDROCHLORIDE 150 MG: 50 TABLET ORAL at 20:56

## 2024-04-17 ASSESSMENT — PAIN SCALES - GENERAL
PAINLEVEL_OUTOF10: 10
PAINLEVEL_OUTOF10: 0
PAINLEVEL_OUTOF10: 10

## 2024-04-17 ASSESSMENT — LIFESTYLE VARIABLES
HOW OFTEN DO YOU HAVE A DRINK CONTAINING ALCOHOL: NEVER
HOW MANY STANDARD DRINKS CONTAINING ALCOHOL DO YOU HAVE ON A TYPICAL DAY: PATIENT DOES NOT DRINK

## 2024-04-17 ASSESSMENT — PAIN - FUNCTIONAL ASSESSMENT: PAIN_FUNCTIONAL_ASSESSMENT: 0-10

## 2024-04-17 ASSESSMENT — PAIN DESCRIPTION - LOCATION
LOCATION: OTHER (COMMENT)
LOCATION: GENERALIZED

## 2024-04-17 NOTE — ED NOTES
Pt back from MRI. Pt denies questions and concerns at this time.      Opal Krishnamurthy, RN  04/17/24 9566

## 2024-04-17 NOTE — ED NOTES
[place] care provided, skin clean, dry and intact. New brief placed. Pt changed into hospital gown. Pt tolerated well.     Opal Krishnamurthy RN  04/17/24 1929

## 2024-04-17 NOTE — ED NOTES
TRANSFER - OUT REPORT:    Verbal report given to DAFNE Martin on Alva Kramer  being transferred to 7th floor for routine progression of patient care       Report consisted of patient's Situation, Background, Assessment and   Recommendations(SBAR).     Information from the following report(s) Nurse Handoff Report was reviewed with the receiving nurse.    [place] Fall Assessment:    Presents to emergency department  because of falls (Syncope, seizure, or loss of consciousness): No  Age > 70: No  Altered Mental Status, Intoxication with alcohol or substance confusion (Disorientation, impaired judgment, poor safety awaremess, or inability to follow instructions): No  Impaired Mobility: Ambulates or transfers with assistive devices or assistance; Unable to ambulate or transer.: Yes (pt uses wheelchair)  Nursing Judgement: Yes          Lines:   Peripheral IV 04/17/24 Right;Dorsal Hand (Active)   Site Assessment Clean, dry & intact 04/17/24 1333   Line Status Flushed 04/17/24 1333   Line Care Connections checked and tightened 04/17/24 1333   Phlebitis Assessment No symptoms 04/17/24 1333   Infiltration Assessment 0 04/17/24 1333   Alcohol Cap Used No 04/17/24 1333   Dressing Status Clean, dry & intact 04/17/24 1333   Dressing Type Transparent 04/17/24 1333        Opportunity for questions and clarification was provided.      Patient transported with:  Opal Kearney RN  04/17/24 2516

## 2024-04-17 NOTE — ED TRIAGE NOTES
Pt has new onset incontinence and weakness in both arms.  Pt does have MS but family wanted pt to be evaluated.  Pt denies N/V/D.

## 2024-04-17 NOTE — ED NOTES
Assumed care of pt at this time. Pt resting quietly in bed, attached to bedside monitor. Pt denies questions and concerns at this time.      Opal Krishnamurthy, DAFNE  04/17/24 4327

## 2024-04-17 NOTE — H&P
Hospitalist History and Physical   Admit Date:  2024 10:15 AM   Name:  Alva Kramer   Age:  54 y.o.  Sex:  female  :  1970   MRN:  [id number]   Room:  06/    Presenting/Chief Complaint: Incontinence and Fatigue     Reason(s) for Admission: Cervical myelopathy (HCC) [G95.9]     History of Present Illness:   Alva Kramer is a 54 y.o. female with medical history of cervical myelopathy (C3 C6), transverse myelitis, multiple sclerosis on Ocrevus, NPH status post  shunt who presented with chief complaints of worsening pain in her neck, bilateral upper and lower extremity weakness and continue bowel incontinence.  Patient reports worsening neck pain, weakness in legs for past 48 hours that is now associated with bowel incontinence.  Patient normally uses a walker to ambulate however for past 2 days she has been having difficulty in using walker and ambulation and is requiring significant assistance.  Patient denies any chest pain, fever, chills, nausea vomiting, diarrhea, headache, lightheadedness.  Patient describes a sensation of weakness as feeling \"dead weight\".  VS on arrival: Tmax 98.0, RR 16, LA 75, /61, sats of 98% on room air.  Blood work was essentially unremarkable.  Cervical MRI without contrast showed spinal cord abnormal signal intensity from C3 down to C6 related to spondylotic myelopathy and chronic compression of the cord with multiple degenerative changes seen throughout the cervical spine with severe central canal stenosis present from C3-C6, marrow signal in the vertebral bodies of C4-C5 felt to be related to atypical hemangiomas.  Ortho was notified in the ER, orthospine will evaluate patient in AM.  Patient has been evaluated by neurosurgery at [facility] on 3/26/2024, at that time patient was noted to have left Darlene sign but normal strength in bilateral upper extremities.  Plan was made to proceed with C5-C6 ACDF or to decompress multiple levels of the stenosis with possible

## 2024-04-17 NOTE — ED PROVIDER NOTES
Emergency Department Provider Signout / Continuation of Care Note         DISPOSITION Decision To Admit 04/17/2024 05:29:29 PM       ICD-10-CM    1. Neck pain  M54.2       2. Weakness of both lower extremities  R29.898       3. Spinal stenosis of cervical region  M48.02       4. Incontinence of feces, unspecified fecal incontinence type  R15.9           The patient's care was signed out to me at shift change.      Final Plan      MRI resulted with severe spinal stenosis of the cervical spine with evidence of myelopathy.  Due to the patient's progression of symptoms and now inability to walk and bowel incontinence I feel she should be admitted.  I have reached out to spine on call and made Yadira Stark the PA on for spine aware of the findings and sent her the report.  She stated that she would let the spine surgeon know.  Patient will be admitted by the hospitalist and consultation with the spine surgeon.       1 or more chronic illnesses with a severe exacerbation or progression.  Discussion with external consultants.  Chronic medical problems impacting care include spinal stenosis.  I reviewed external records: previous imaging study including radiologist interpretation.     I interpreted the MRI of the cervical spine shows severe spinal stenosis with myelopathy.  I have reviewed and agree with radiology report.    The patient was admitted and I have discussed patient management with the admitting provider.  The management of this patient was discussed with an external consultant.      Exclusion criteria - the patient is NOT to be included for SEP-1 Core Measure due to: Infection is not suspected           Jimmy James DO  04/17/24 1738    
MG/DL    Est, Glom Filt Rate >90 >60 ml/min/1.73m2    Calcium 9.6 8.3 - 10.4 MG/DL   CBC with Auto Differential   Result Value Ref Range    WBC 7.4 4.3 - 11.1 K/uL    RBC 4.86 4.05 - 5.2 M/uL    Hemoglobin 13.4 11.7 - 15.4 g/dL    Hematocrit 41.4 35.8 - 46.3 %    MCV 85.2 82 - 102 FL    MCH 27.6 26.1 - 32.9 PG    MCHC 32.4 31.4 - 35.0 g/dL    RDW 14.2 11.9 - 14.6 %    Platelets 246 150 - 450 K/uL    MPV 9.0 (L) 9.4 - 12.3 FL    nRBC 0.00 0.0 - 0.2 K/uL    Differential Type AUTOMATED      Neutrophils % 71 43 - 78 %    Lymphocytes % 15 13 - 44 %    Monocytes % 11 4.0 - 12.0 %    Eosinophils % 2 0.5 - 7.8 %    Basophils % 1 0.0 - 2.0 %    Immature Granulocytes % 0 0.0 - 5.0 %    Neutrophils Absolute 5.3 1.7 - 8.2 K/UL    Lymphocytes Absolute 1.1 0.5 - 4.6 K/UL    Monocytes Absolute 0.8 0.1 - 1.3 K/UL    Eosinophils Absolute 0.1 0.0 - 0.8 K/UL    Basophils Absolute 0.1 0.0 - 0.2 K/UL    Immature Granulocytes Absolute 0.0 0.0 - 0.5 K/UL   Urinalysis, Micro   Result Value Ref Range    WBC, UA 0-4 U4 /hpf    RBC, UA 0-5 U5 /hpf    Epithelial Cells UA 5-10 (A) U5 /hpf    BACTERIA, URINE Negative NEG /hpf    Casts 0-2 U2 /lpf   POCT Urinalysis no Micro   Result Value Ref Range    Specific Gravity, Urine, POC 1.015 1.001 - 1.023      pH, Urine, POC 7.0 5.0 - 9.0      Protein, Urine, POC Negative NEG mg/dL    Glucose, UA POC Negative NEG mg/dL    Ketones, Urine, POC Negative NEG mg/dL    Bilirubin, Urine, POC Negative NEG      Blood, UA POC Negative NEG      URINE UROBILINOGEN POC 1.0 0.2 - 1.0 EU/dL    Nitrite, Urine, POC Negative NEG      Leukocyte Est, UA POC TRACE (A) NEG      Performed by: Dee Avendano          MRI CERVICAL SPINE WO CONTRAST    (Results Pending)                No results for input(s): \"COVID19\" in the last 72 hours.     Voice dictation software was used during the making of this note.  This software is not perfect and grammatical and other typographical errors may be present.  This note has not been

## 2024-04-18 ENCOUNTER — APPOINTMENT (OUTPATIENT)
Dept: MRI IMAGING | Age: 54
DRG: 321 | End: 2024-04-18
Payer: MEDICAID

## 2024-04-18 ENCOUNTER — APPOINTMENT (OUTPATIENT)
Dept: GENERAL RADIOLOGY | Age: 54
DRG: 321 | End: 2024-04-18
Payer: MEDICAID

## 2024-04-18 ENCOUNTER — ANESTHESIA (OUTPATIENT)
Dept: SURGERY | Age: 54
End: 2024-04-18
Payer: MEDICAID

## 2024-04-18 ENCOUNTER — ANESTHESIA EVENT (OUTPATIENT)
Dept: SURGERY | Age: 54
End: 2024-04-18
Payer: MEDICAID

## 2024-04-18 LAB
ABO + RH BLD: NORMAL
ANION GAP SERPL CALC-SCNC: 4 MMOL/L (ref 2–11)
BASOPHILS # BLD: 0 K/UL (ref 0–0.2)
BASOPHILS NFR BLD: 0 % (ref 0–2)
BLOOD GROUP ANTIBODIES SERPL: NORMAL
BUN SERPL-MCNC: 16 MG/DL (ref 6–23)
CALCIUM SERPL-MCNC: 9.1 MG/DL (ref 8.3–10.4)
CHLORIDE SERPL-SCNC: 112 MMOL/L (ref 103–113)
CO2 SERPL-SCNC: 26 MMOL/L (ref 21–32)
CREAT SERPL-MCNC: 0.7 MG/DL (ref 0.6–1)
DIFFERENTIAL METHOD BLD: ABNORMAL
EOSINOPHIL # BLD: 0 K/UL (ref 0–0.8)
EOSINOPHIL NFR BLD: 0 % (ref 0.5–7.8)
ERYTHROCYTE [DISTWIDTH] IN BLOOD BY AUTOMATED COUNT: 14.3 % (ref 11.9–14.6)
GLUCOSE SERPL-MCNC: 137 MG/DL (ref 65–100)
HCT VFR BLD AUTO: 39.8 % (ref 35.8–46.3)
HGB BLD-MCNC: 12.8 G/DL (ref 11.7–15.4)
IMM GRANULOCYTES # BLD AUTO: 0 K/UL (ref 0–0.5)
IMM GRANULOCYTES NFR BLD AUTO: 0 % (ref 0–5)
LYMPHOCYTES # BLD: 0.5 K/UL (ref 0.5–4.6)
LYMPHOCYTES NFR BLD: 9 % (ref 13–44)
MCH RBC QN AUTO: 27.5 PG (ref 26.1–32.9)
MCHC RBC AUTO-ENTMCNC: 32.2 G/DL (ref 31.4–35)
MCV RBC AUTO: 85.4 FL (ref 82–102)
MONOCYTES # BLD: 0.1 K/UL (ref 0.1–1.3)
MONOCYTES NFR BLD: 2 % (ref 4–12)
NEUTS SEG # BLD: 4.6 K/UL (ref 1.7–8.2)
NEUTS SEG NFR BLD: 89 % (ref 43–78)
NRBC # BLD: 0 K/UL (ref 0–0.2)
PLATELET # BLD AUTO: 233 K/UL (ref 150–450)
PMV BLD AUTO: 9 FL (ref 9.4–12.3)
POTASSIUM SERPL-SCNC: 4.1 MMOL/L (ref 3.5–5.1)
RBC # BLD AUTO: 4.66 M/UL (ref 4.05–5.2)
SODIUM SERPL-SCNC: 142 MMOL/L (ref 136–146)
SPECIMEN EXP DATE BLD: NORMAL
WBC # BLD AUTO: 5.2 K/UL (ref 4.3–11.1)

## 2024-04-18 PROCEDURE — 7100000001 HC PACU RECOVERY - ADDTL 15 MIN: Performed by: ORTHOPAEDIC SURGERY

## 2024-04-18 PROCEDURE — 6360000002 HC RX W HCPCS: Performed by: FAMILY MEDICINE

## 2024-04-18 PROCEDURE — 6360000002 HC RX W HCPCS: Performed by: ORTHOPAEDIC SURGERY

## 2024-04-18 PROCEDURE — C1889 IMPLANT/INSERT DEVICE, NOC: HCPCS | Performed by: ORTHOPAEDIC SURGERY

## 2024-04-18 PROCEDURE — 2580000003 HC RX 258: Performed by: ORTHOPAEDIC SURGERY

## 2024-04-18 PROCEDURE — C1713 ANCHOR/SCREW BN/BN,TIS/BN: HCPCS | Performed by: ORTHOPAEDIC SURGERY

## 2024-04-18 PROCEDURE — 6360000002 HC RX W HCPCS: Performed by: NURSE ANESTHETIST, CERTIFIED REGISTERED

## 2024-04-18 PROCEDURE — 72146 MRI CHEST SPINE W/O DYE: CPT

## 2024-04-18 PROCEDURE — 3700000000 HC ANESTHESIA ATTENDED CARE: Performed by: ORTHOPAEDIC SURGERY

## 2024-04-18 PROCEDURE — 6360000002 HC RX W HCPCS: Performed by: ANESTHESIOLOGY

## 2024-04-18 PROCEDURE — 6360000002 HC RX W HCPCS: Performed by: HOSPITALIST

## 2024-04-18 PROCEDURE — 2580000003 HC RX 258: Performed by: ANESTHESIOLOGY

## 2024-04-18 PROCEDURE — 3600000004 HC SURGERY LEVEL 4 BASE: Performed by: ORTHOPAEDIC SURGERY

## 2024-04-18 PROCEDURE — 6370000000 HC RX 637 (ALT 250 FOR IP): Performed by: ANESTHESIOLOGY

## 2024-04-18 PROCEDURE — A4216 STERILE WATER/SALINE, 10 ML: HCPCS | Performed by: FAMILY MEDICINE

## 2024-04-18 PROCEDURE — 3600000014 HC SURGERY LEVEL 4 ADDTL 15MIN: Performed by: ORTHOPAEDIC SURGERY

## 2024-04-18 PROCEDURE — 86900 BLOOD TYPING SEROLOGIC ABO: CPT

## 2024-04-18 PROCEDURE — 6370000000 HC RX 637 (ALT 250 FOR IP): Performed by: ORTHOPAEDIC SURGERY

## 2024-04-18 PROCEDURE — 36415 COLL VENOUS BLD VENIPUNCTURE: CPT

## 2024-04-18 PROCEDURE — 86850 RBC ANTIBODY SCREEN: CPT

## 2024-04-18 PROCEDURE — 86901 BLOOD TYPING SEROLOGIC RH(D): CPT

## 2024-04-18 PROCEDURE — 2720000010 HC SURG SUPPLY STERILE: Performed by: ORTHOPAEDIC SURGERY

## 2024-04-18 PROCEDURE — 72148 MRI LUMBAR SPINE W/O DYE: CPT

## 2024-04-18 PROCEDURE — 99221 1ST HOSP IP/OBS SF/LOW 40: CPT | Performed by: ORTHOPAEDIC SURGERY

## 2024-04-18 PROCEDURE — 2500000003 HC RX 250 WO HCPCS: Performed by: ORTHOPAEDIC SURGERY

## 2024-04-18 PROCEDURE — A4217 STERILE WATER/SALINE, 500 ML: HCPCS | Performed by: ORTHOPAEDIC SURGERY

## 2024-04-18 PROCEDURE — 2580000003 HC RX 258: Performed by: FAMILY MEDICINE

## 2024-04-18 PROCEDURE — 85025 COMPLETE CBC W/AUTO DIFF WBC: CPT

## 2024-04-18 PROCEDURE — 80048 BASIC METABOLIC PNL TOTAL CA: CPT

## 2024-04-18 PROCEDURE — 1100000000 HC RM PRIVATE

## 2024-04-18 PROCEDURE — 3700000001 HC ADD 15 MINUTES (ANESTHESIA): Performed by: ORTHOPAEDIC SURGERY

## 2024-04-18 PROCEDURE — 2500000003 HC RX 250 WO HCPCS

## 2024-04-18 PROCEDURE — 7100000000 HC PACU RECOVERY - FIRST 15 MIN: Performed by: ORTHOPAEDIC SURGERY

## 2024-04-18 PROCEDURE — 2709999900 HC NON-CHARGEABLE SUPPLY: Performed by: ORTHOPAEDIC SURGERY

## 2024-04-18 PROCEDURE — 2500000003 HC RX 250 WO HCPCS: Performed by: NURSE ANESTHETIST, CERTIFIED REGISTERED

## 2024-04-18 DEVICE — SCREW 035X12MM POLYAXIAL: Type: IMPLANTABLE DEVICE | Site: SPINE CERVICAL | Status: FUNCTIONAL

## 2024-04-18 DEVICE — ROD SPNL CONTOURED 3.5X55 MM YUKON OCT: Type: IMPLANTABLE DEVICE | Site: SPINE CERVICAL | Status: FUNCTIONAL

## 2024-04-18 DEVICE — SCREW SPINE SET LKING CAP: Type: IMPLANTABLE DEVICE | Site: SPINE CERVICAL | Status: FUNCTIONAL

## 2024-04-18 DEVICE — GRAFT BNE MED: Type: IMPLANTABLE DEVICE | Site: SPINE CERVICAL | Status: FUNCTIONAL

## 2024-04-18 DEVICE — ALLOGRAFT BNE CHIP 1-4 MM 5 CC CRUSH CANC: Type: IMPLANTABLE DEVICE | Site: SPINE CERVICAL | Status: FUNCTIONAL

## 2024-04-18 DEVICE — BIO DBM PLUS PUTTY (WITH CANCELLOUS)
Type: IMPLANTABLE DEVICE | Site: SPINE CERVICAL | Status: FUNCTIONAL
Brand: BIO DBM

## 2024-04-18 RX ORDER — DIPHENHYDRAMINE HYDROCHLORIDE 50 MG/ML
12.5 INJECTION INTRAMUSCULAR; INTRAVENOUS
Status: COMPLETED | OUTPATIENT
Start: 2024-04-18 | End: 2024-04-18

## 2024-04-18 RX ORDER — PROPOFOL 10 MG/ML
INJECTION, EMULSION INTRAVENOUS PRN
Status: DISCONTINUED | OUTPATIENT
Start: 2024-04-18 | End: 2024-04-18 | Stop reason: SDUPTHER

## 2024-04-18 RX ORDER — SODIUM CHLORIDE 9 MG/ML
INJECTION, SOLUTION INTRAVENOUS CONTINUOUS
Status: DISCONTINUED | OUTPATIENT
Start: 2024-04-18 | End: 2024-04-19

## 2024-04-18 RX ORDER — KETAMINE HYDROCHLORIDE 50 MG/ML
INJECTION, SOLUTION INTRAMUSCULAR; INTRAVENOUS PRN
Status: DISCONTINUED | OUTPATIENT
Start: 2024-04-18 | End: 2024-04-18 | Stop reason: SDUPTHER

## 2024-04-18 RX ORDER — NALOXONE HYDROCHLORIDE 0.4 MG/ML
INJECTION, SOLUTION INTRAMUSCULAR; INTRAVENOUS; SUBCUTANEOUS PRN
Status: DISCONTINUED | OUTPATIENT
Start: 2024-04-18 | End: 2024-04-18 | Stop reason: HOSPADM

## 2024-04-18 RX ORDER — ONDANSETRON 2 MG/ML
4 INJECTION INTRAMUSCULAR; INTRAVENOUS
Status: DISCONTINUED | OUTPATIENT
Start: 2024-04-18 | End: 2024-04-18 | Stop reason: HOSPADM

## 2024-04-18 RX ORDER — METHOCARBAMOL 750 MG/1
750 TABLET, FILM COATED ORAL EVERY 8 HOURS PRN
Status: COMPLETED | OUTPATIENT
Start: 2024-04-18 | End: 2024-04-29

## 2024-04-18 RX ORDER — MIDAZOLAM HYDROCHLORIDE 2 MG/2ML
2 INJECTION, SOLUTION INTRAMUSCULAR; INTRAVENOUS
Status: DISCONTINUED | OUTPATIENT
Start: 2024-04-18 | End: 2024-04-18 | Stop reason: HOSPADM

## 2024-04-18 RX ORDER — FENTANYL CITRATE 50 UG/ML
INJECTION, SOLUTION INTRAMUSCULAR; INTRAVENOUS PRN
Status: DISCONTINUED | OUTPATIENT
Start: 2024-04-18 | End: 2024-04-18 | Stop reason: SDUPTHER

## 2024-04-18 RX ORDER — HYDROMORPHONE HYDROCHLORIDE 2 MG/ML
0.5 INJECTION, SOLUTION INTRAMUSCULAR; INTRAVENOUS; SUBCUTANEOUS EVERY 10 MIN PRN
Status: DISCONTINUED | OUTPATIENT
Start: 2024-04-18 | End: 2024-04-18 | Stop reason: HOSPADM

## 2024-04-18 RX ORDER — SODIUM CHLORIDE 0.9 % (FLUSH) 0.9 %
5-40 SYRINGE (ML) INJECTION EVERY 12 HOURS SCHEDULED
Status: DISCONTINUED | OUTPATIENT
Start: 2024-04-18 | End: 2024-04-18 | Stop reason: HOSPADM

## 2024-04-18 RX ORDER — VANCOMYCIN HYDROCHLORIDE 1 G/20ML
INJECTION, POWDER, LYOPHILIZED, FOR SOLUTION INTRAVENOUS PRN
Status: DISCONTINUED | OUTPATIENT
Start: 2024-04-18 | End: 2024-04-18 | Stop reason: ALTCHOICE

## 2024-04-18 RX ORDER — TRANEXAMIC ACID 100 MG/ML
INJECTION, SOLUTION INTRAVENOUS PRN
Status: DISCONTINUED | OUTPATIENT
Start: 2024-04-18 | End: 2024-04-18 | Stop reason: SDUPTHER

## 2024-04-18 RX ORDER — OXYCODONE HYDROCHLORIDE 5 MG/1
5 TABLET ORAL
Status: COMPLETED | OUTPATIENT
Start: 2024-04-18 | End: 2024-04-18

## 2024-04-18 RX ORDER — ROCURONIUM BROMIDE 10 MG/ML
INJECTION, SOLUTION INTRAVENOUS PRN
Status: DISCONTINUED | OUTPATIENT
Start: 2024-04-18 | End: 2024-04-18 | Stop reason: SDUPTHER

## 2024-04-18 RX ORDER — DEXAMETHASONE SODIUM PHOSPHATE 10 MG/ML
INJECTION INTRAMUSCULAR; INTRAVENOUS PRN
Status: DISCONTINUED | OUTPATIENT
Start: 2024-04-18 | End: 2024-04-18 | Stop reason: SDUPTHER

## 2024-04-18 RX ORDER — FENTANYL CITRATE 50 UG/ML
25 INJECTION, SOLUTION INTRAMUSCULAR; INTRAVENOUS EVERY 5 MIN PRN
Status: DISCONTINUED | OUTPATIENT
Start: 2024-04-18 | End: 2024-04-18 | Stop reason: HOSPADM

## 2024-04-18 RX ORDER — LIDOCAINE HYDROCHLORIDE 20 MG/ML
INJECTION, SOLUTION EPIDURAL; INFILTRATION; INTRACAUDAL; PERINEURAL PRN
Status: DISCONTINUED | OUTPATIENT
Start: 2024-04-18 | End: 2024-04-18 | Stop reason: SDUPTHER

## 2024-04-18 RX ORDER — FENTANYL CITRATE 50 UG/ML
100 INJECTION, SOLUTION INTRAMUSCULAR; INTRAVENOUS
Status: DISCONTINUED | OUTPATIENT
Start: 2024-04-18 | End: 2024-04-18 | Stop reason: HOSPADM

## 2024-04-18 RX ORDER — SODIUM CHLORIDE, SODIUM LACTATE, POTASSIUM CHLORIDE, CALCIUM CHLORIDE 600; 310; 30; 20 MG/100ML; MG/100ML; MG/100ML; MG/100ML
INJECTION, SOLUTION INTRAVENOUS CONTINUOUS
Status: DISCONTINUED | OUTPATIENT
Start: 2024-04-18 | End: 2024-04-18 | Stop reason: HOSPADM

## 2024-04-18 RX ORDER — SCOLOPAMINE TRANSDERMAL SYSTEM 1 MG/1
1 PATCH, EXTENDED RELEASE TRANSDERMAL
Status: DISCONTINUED | OUTPATIENT
Start: 2024-04-18 | End: 2024-04-18

## 2024-04-18 RX ORDER — ONDANSETRON 2 MG/ML
INJECTION INTRAMUSCULAR; INTRAVENOUS PRN
Status: DISCONTINUED | OUTPATIENT
Start: 2024-04-18 | End: 2024-04-18 | Stop reason: SDUPTHER

## 2024-04-18 RX ORDER — METOCLOPRAMIDE HYDROCHLORIDE 5 MG/ML
10 INJECTION INTRAMUSCULAR; INTRAVENOUS
Status: DISCONTINUED | OUTPATIENT
Start: 2024-04-18 | End: 2024-04-18 | Stop reason: HOSPADM

## 2024-04-18 RX ORDER — HYDROMORPHONE HYDROCHLORIDE 2 MG/ML
INJECTION, SOLUTION INTRAMUSCULAR; INTRAVENOUS; SUBCUTANEOUS PRN
Status: DISCONTINUED | OUTPATIENT
Start: 2024-04-18 | End: 2024-04-18 | Stop reason: SDUPTHER

## 2024-04-18 RX ADMIN — LORAZEPAM 2 MG: 2 INJECTION INTRAMUSCULAR; INTRAVENOUS at 07:57

## 2024-04-18 RX ADMIN — HYDROMORPHONE HYDROCHLORIDE 0.5 MG: 2 INJECTION INTRAMUSCULAR; INTRAVENOUS; SUBCUTANEOUS at 17:05

## 2024-04-18 RX ADMIN — DEXAMETHASONE SODIUM PHOSPHATE 8 MG: 10 INJECTION INTRAMUSCULAR; INTRAVENOUS at 06:24

## 2024-04-18 RX ADMIN — DEXAMETHASONE SODIUM PHOSPHATE 8 MG: 10 INJECTION INTRAMUSCULAR; INTRAVENOUS at 19:38

## 2024-04-18 RX ADMIN — TUBERCULIN PURIFIED PROTEIN DERIVATIVE 5 UNITS: 5 INJECTION, SOLUTION INTRADERMAL at 20:56

## 2024-04-18 RX ADMIN — ROCURONIUM BROMIDE 10 MG: 10 INJECTION, SOLUTION INTRAVENOUS at 15:41

## 2024-04-18 RX ADMIN — HYDROMORPHONE HYDROCHLORIDE 0.4 MG: 2 INJECTION INTRAMUSCULAR; INTRAVENOUS; SUBCUTANEOUS at 14:46

## 2024-04-18 RX ADMIN — CEFAZOLIN 2000 MG: 10 INJECTION, POWDER, FOR SOLUTION INTRAVENOUS at 19:33

## 2024-04-18 RX ADMIN — SODIUM CHLORIDE, SODIUM LACTATE, POTASSIUM CHLORIDE, AND CALCIUM CHLORIDE: 600; 310; 30; 20 INJECTION, SOLUTION INTRAVENOUS at 15:20

## 2024-04-18 RX ADMIN — HYDROMORPHONE HYDROCHLORIDE 0.2 MG: 2 INJECTION INTRAMUSCULAR; INTRAVENOUS; SUBCUTANEOUS at 16:15

## 2024-04-18 RX ADMIN — LIDOCAINE HYDROCHLORIDE 100 MG: 20 INJECTION, SOLUTION EPIDURAL; INFILTRATION; INTRACAUDAL; PERINEURAL at 13:30

## 2024-04-18 RX ADMIN — HYDROCODONE BITARTRATE AND ACETAMINOPHEN 1 TABLET: 7.5; 325 TABLET ORAL at 19:33

## 2024-04-18 RX ADMIN — TRANEXAMIC ACID 1000 MG: 100 INJECTION, SOLUTION INTRAVENOUS at 16:04

## 2024-04-18 RX ADMIN — TRAZODONE HYDROCHLORIDE 150 MG: 50 TABLET ORAL at 20:56

## 2024-04-18 RX ADMIN — ROCURONIUM BROMIDE 20 MG: 10 INJECTION, SOLUTION INTRAVENOUS at 14:12

## 2024-04-18 RX ADMIN — SODIUM CHLORIDE, PRESERVATIVE FREE 10 ML: 5 INJECTION INTRAVENOUS at 19:33

## 2024-04-18 RX ADMIN — OXYBUTYNIN CHLORIDE 10 MG: 10 TABLET, EXTENDED RELEASE ORAL at 19:38

## 2024-04-18 RX ADMIN — SODIUM CHLORIDE: 9 INJECTION, SOLUTION INTRAVENOUS at 19:50

## 2024-04-18 RX ADMIN — DIPHENHYDRAMINE HYDROCHLORIDE 12.5 MG: 50 INJECTION INTRAMUSCULAR; INTRAVENOUS at 17:16

## 2024-04-18 RX ADMIN — SODIUM CHLORIDE, SODIUM LACTATE, POTASSIUM CHLORIDE, AND CALCIUM CHLORIDE: 600; 310; 30; 20 INJECTION, SOLUTION INTRAVENOUS at 11:45

## 2024-04-18 RX ADMIN — HYDROMORPHONE HYDROCHLORIDE 0.2 MG: 2 INJECTION INTRAMUSCULAR; INTRAVENOUS; SUBCUTANEOUS at 16:13

## 2024-04-18 RX ADMIN — PROPOFOL 200 MG: 10 INJECTION, EMULSION INTRAVENOUS at 13:30

## 2024-04-18 RX ADMIN — Medication 2000 MG: at 14:00

## 2024-04-18 RX ADMIN — TRANEXAMIC ACID 1000 MG: 100 INJECTION, SOLUTION INTRAVENOUS at 13:50

## 2024-04-18 RX ADMIN — FAMOTIDINE 20 MG: 20 TABLET, FILM COATED ORAL at 19:38

## 2024-04-18 RX ADMIN — HYDROMORPHONE HYDROCHLORIDE 0.5 MG: 2 INJECTION INTRAMUSCULAR; INTRAVENOUS; SUBCUTANEOUS at 17:12

## 2024-04-18 RX ADMIN — ROCURONIUM BROMIDE 50 MG: 10 INJECTION, SOLUTION INTRAVENOUS at 13:31

## 2024-04-18 RX ADMIN — FENTANYL CITRATE 100 MCG: 50 INJECTION, SOLUTION INTRAMUSCULAR; INTRAVENOUS at 14:10

## 2024-04-18 RX ADMIN — KETAMINE HYDROCHLORIDE 20 MG: 50 INJECTION, SOLUTION INTRAMUSCULAR; INTRAVENOUS at 14:46

## 2024-04-18 RX ADMIN — ONDANSETRON 4 MG: 2 INJECTION INTRAMUSCULAR; INTRAVENOUS at 13:35

## 2024-04-18 RX ADMIN — ROCURONIUM BROMIDE 20 MG: 10 INJECTION, SOLUTION INTRAVENOUS at 15:10

## 2024-04-18 RX ADMIN — DEXAMETHASONE SODIUM PHOSPHATE 10 MG: 10 INJECTION INTRAMUSCULAR; INTRAVENOUS at 13:34

## 2024-04-18 RX ADMIN — KETAMINE HYDROCHLORIDE 40 MG: 50 INJECTION, SOLUTION INTRAMUSCULAR; INTRAVENOUS at 14:00

## 2024-04-18 RX ADMIN — KETAMINE HYDROCHLORIDE 20 MG: 50 INJECTION, SOLUTION INTRAMUSCULAR; INTRAVENOUS at 15:37

## 2024-04-18 RX ADMIN — GABAPENTIN 300 MG: 300 CAPSULE ORAL at 20:56

## 2024-04-18 RX ADMIN — OXYCODONE 5 MG: 5 TABLET ORAL at 17:49

## 2024-04-18 RX ADMIN — SUGAMMADEX 200 MG: 100 INJECTION, SOLUTION INTRAVENOUS at 16:42

## 2024-04-18 RX ADMIN — Medication 3 MG: at 20:56

## 2024-04-18 ASSESSMENT — PAIN - FUNCTIONAL ASSESSMENT
PAIN_FUNCTIONAL_ASSESSMENT: 0-10

## 2024-04-18 ASSESSMENT — PAIN DESCRIPTION - ONSET: ONSET: ON-GOING

## 2024-04-18 ASSESSMENT — PAIN SCALES - GENERAL
PAINLEVEL_OUTOF10: 10
PAINLEVEL_OUTOF10: 8
PAINLEVEL_OUTOF10: 9
PAINLEVEL_OUTOF10: 10
PAINLEVEL_OUTOF10: 0
PAINLEVEL_OUTOF10: 6
PAINLEVEL_OUTOF10: 0

## 2024-04-18 ASSESSMENT — PAIN DESCRIPTION - FREQUENCY: FREQUENCY: CONTINUOUS

## 2024-04-18 ASSESSMENT — PAIN DESCRIPTION - LOCATION
LOCATION: NECK
LOCATION: GENERALIZED
LOCATION: NECK

## 2024-04-18 ASSESSMENT — PAIN DESCRIPTION - PAIN TYPE: TYPE: ACUTE PAIN;CHRONIC PAIN;SURGICAL PAIN

## 2024-04-18 ASSESSMENT — PAIN DESCRIPTION - DESCRIPTORS: DESCRIPTORS: ACHING

## 2024-04-18 NOTE — PERIOP NOTE
TRANSFER - OUT REPORT:    Verbal report given to Soila on Alva Kramer  being transferred to room 709 for routine progression of patient care       Report consisted of patient's Situation, Background, Assessment and   Recommendations(SBAR).     Information from the following report(s) Nurse Handoff Report was reviewed with the receiving nurse.           Lines:   Peripheral IV 04/17/24 Right;Dorsal Hand (Active)   Site Assessment Clean, dry & intact 04/18/24 0718   Line Status Flushed;Infusing 04/18/24 0718   Line Care Connections checked and tightened 04/18/24 0718   Phlebitis Assessment No symptoms 04/18/24 0718   Infiltration Assessment 0 04/18/24 0718   Alcohol Cap Used Yes 04/18/24 0718   Dressing Status Clean, dry & intact 04/18/24 0718   Dressing Type Transparent 04/18/24 0718       Peripheral IV 04/18/24 Right Forearm (Active)        Opportunity for questions and clarification was provided.      Patient transported with:  Tech

## 2024-04-18 NOTE — CARE COORDINATION
Pt chart reviewed for discharge planning. Pt has been seen by [facility] ED multiple times within the past 30 days. CM met with pt, verified demographic information/health insurance. Prior to admission, pt had recently discharged home from [facility] for STR. Pt is not interested in returning to [facility]. Per chart family does not think pt is able to care for herself. Pt's PCP is with [facility], last seen over one year ago. Pt requires assistance with mobility, does not drive, and lives with her two brothers in a trailer. Pt's home has a walk in shower. Pt reports she had HH in the past but was not able to recall name of company. Pt has a wheelchair, cane, and walker at home.  PT recommending STR.  Pt has [employer] Medicaid, will refer pt to facilities that are in network for any options. CM will follow pt plan of care and assist with supportive care referrals pending pt clinical progress.  Please consult case management if specific needs arise.         04/18/24 1218   Service Assessment   Patient Orientation Alert and Oriented   Cognition Alert   History Provided By Medical Record;Patient   Primary Caregiver Self   Support Systems Children;Family Members   Patient's Healthcare Decision Maker is: Legal Next of Kin   PCP Verified by CM Yes  ([facility])   Last Visit to PCP Within last two years   Prior Functional Level Assistance with the following:;Mobility   Current Functional Level Assistance with the following:;Mobility   Can patient return to prior living arrangement Yes   Ability to make needs known: Good   Family able to assist with home care needs: No   Would you like for me to discuss the discharge plan with any other family members/significant others, and if so, who? Yes  (Dtr)   Financial Resources Medicaid   Community Resources None   Social/Functional History   Lives With Family   Type of Home Trailer   Home Layout One level   Bathroom Shower/Tub Walk-in shower   Home Equipment

## 2024-04-18 NOTE — CONSULTS
Name: Alva Kramer  YOB: 1970  Gender: female  MRN: [id number]  Age: 54 y.o.    History of present illness:    This is a very pleasant 54 y.o. female who presents with long history of dexterity issues in her hands, imbalance, weakness in her legs.  She been seen by outside hospital and surgery was planned to be done in May 2024.  She went to that hospital 2 days ago due to worsening weakness in her legs and two episodes of incontinence and was subsequently discharged from that hospital.  She came here last night due to continuing issues with ambulation and weakness in her legs.  Normally she uses a walker to ambulate however she is unable to use that now. She states that she has been unable to ambulate at her baseline for the last 2-3 days.  She states that she has tingling in her upper and lower extremities.  She states that she feels weak in her arms and her legs.  She has not had any further episodes of incontinence.  She does have a history of multiple sclerosis, transverse myelitis, as well as hydrocephalus requiring a  shunt.  She does have a history of breast cancer.  She does not smoke.      Medications:     Prior to Visit Medications    Medication Sig Taking? Authorizing Provider   traZODone (DESYREL) 150 MG tablet Take 1 tablet by mouth nightly Yes Vero Catherine MD   acetaminophen (TYLENOL) 325 MG tablet Take 2 tablets by mouth every 6 hours as needed for Pain Yes Vero Catherine MD   famotidine (PEPCID) 20 MG tablet Take 1 tablet by mouth 2 times daily Yes Vero Catherine MD   gabapentin (NEURONTIN) 300 MG capsule Take 1 capsule by mouth 3 times daily. Yes Vero Catherine MD   melatonin 3 MG TABS tablet Take 5 mg by mouth nightly as needed Yes Vero Catherine MD   oxyBUTYnin (DITROPAN-XL) 10 MG extended release tablet Take 1 tablet by mouth daily Yes Vero Catherine MD   FLUoxetine (PROZAC) 20 MG capsule Take 1 capsule by mouth daily Yes Dorene

## 2024-04-18 NOTE — OP NOTE
[facility] [id number]   [phone]    OPERATIVE REPORT    Patient ID:Alva Kramer  [id number]  1970  54 y.o.    DATE OF SURGERY: 4/17/2024    SURGEON: Jaquan Reyna MD    PREOPERATIVE DIAGNOSIS: Multilevel cervical stenosis with myelopathy.    POSTOPERATIVE DIAGNOSIS: Multilevel cervical stenosis with myelopathy.    PROCEDURES:  1. Cervical laminectomy C3-C6 (CPT 98939).  2. Posterior cervical arthrodesis C3-C4, C4-C5, C5-C6. (CPT 41949, 22614 X 2)  3. Lateral mass instrumentation C3-C6 (CPT 37429)   4. Allograft (CPT 48421)    ANESTHESIA: General.    ESTIMATED BLOOD LOSS:  30 ml    INTRAOPERATIVE COMPLICATIONS: None.    INTRAOPERATIVE NEUROMONITORING: There were no significant sustained changes in somatosensory-evoked potential monitoring.  Her upper extremities had reliable responses.  Her lower extremities had unreliable responses in both prepositioning as well as throughout the case.  There were no changes in her baseline neuromonitoring characteristics throughout the case.    POSTOPERATIVE CONDITION: Stable.    IMPLANTS:   Implant Name Type Inv. Item Serial No.  Lot No. LRB No. Used Action   GRAFT BNE MED - [id number]  GRA[employer] [id number] BIOLOGICA  N/A 1 Implanted   PUTTY BIO DBM 10 ML W CHIPS - [id number]  PUTTY BIO DBM 10 ML W CHIPS  [employer] [facility] [id number] N/A 1 Implanted   ALLOGRAFT BNE CHIP 1-4 MM 5 CC CRUSH CAN - [id number]  ALLOGRAFT BNE CHIP 1-4 MM 5 CC CRUSH CAN [id number] [employer] [facility]  N/A 1 Implanted   SCREW SPINE SET LKING CAP - [id number]  SCREW SPINE SET LKING CAP  [employer] [facility]- [id number] N/A 8 Implanted   SCREW [id number] POLYAXIAL - [id number]  SCREW [id number] POLYAXIAL  [employer] [facility] [id number] N/A 8 Implanted   GURPREET SPNL CONTOURED 3.5X55 MM YUKON OCT - [id number]  GURPREET SPNL CONTOURED 3.5X55 MM YUKON OCT  [employer] [facility] [id number] N/A 2 Implanted         INDICATIONS

## 2024-04-18 NOTE — ANESTHESIA PRE PROCEDURE
reviewed and Nursing notes reviewed   no history of anesthetic complications:   Airway: Mallampati: II  TM distance: >3 FB   Neck ROM: full  Mouth opening: > = 3 FB   Dental:    (+) upper dentures      Pulmonary:Negative Pulmonary ROS breath sounds clear to auscultation                             Cardiovascular:Negative CV ROS  Exercise tolerance: poor (<4 METS)          Rhythm: regular  Rate: normal                    Neuro/Psych:                ROS comment: NPH -  shunt in situ  Multiple sclerosis   GI/Hepatic/Renal: Neg GI/Hepatic/Renal ROS            Endo/Other: Negative Endo/Other ROS                    Abdominal:             Vascular:          Other Findings:       Anesthesia Plan      general     ASA 3       Induction: intravenous.    MIPS: Postoperative opioids intended and Prophylactic antiemetics administered.  Anesthetic plan and risks discussed with patient and child/children.    Use of blood products discussed with patient whom consented to blood products.                  EDUIN WARNER MD   4/18/2024

## 2024-04-18 NOTE — ANESTHESIA POSTPROCEDURE EVALUATION
Department of Anesthesiology  Postprocedure Note    Patient: Alva Kramer  MRN: [id number]  YOB: 1970  Date of evaluation: 4/18/2024    Procedure Summary       Date: 04/18/24 Room / Location: [facility] MAIN OR  / [facility] MAIN OR    Anesthesia Start: 1326 Anesthesia Stop: 1654    Procedure: CERVICAL LAMINECTOMY FUSION POSTERIOR   C3-C6 (Spine Cervical) Diagnosis:       Spinal stenosis of cervical region      (Spinal stenosis of cervical region [M48.02])    Providers: Jaquan Reyna MD Responsible Provider: Winston Strange MD    Anesthesia Type: general ASA Status: 3            Anesthesia Type: No value filed.    Aayush Phase I: Aayush Score: 10    Aayush Phase II:      Anesthesia Post Evaluation    Patient location during evaluation: PACU  Patient participation: complete - patient participated  Level of consciousness: awake and alert  Airway patency: patent  Nausea & Vomiting: no nausea and no vomiting  Cardiovascular status: hemodynamically stable  Respiratory status: acceptable, nonlabored ventilation and spontaneous ventilation  Hydration status: euvolemic  Comments: BP (!) 146/78   Pulse 81   Temp 98.2 °F (36.8 °C) (Oral)   Resp 18   Ht 1.702 m (5' 7\")   Wt 87.8 kg (193 lb 9 oz)   SpO2 90%   BMI 30.32 kg/m²     Multimodal analgesia pain management approach  Pain management: adequate and satisfactory to patient        No notable events documented.

## 2024-04-19 LAB
ANION GAP SERPL CALC-SCNC: 5 MMOL/L (ref 2–11)
BASOPHILS # BLD: 0 K/UL (ref 0–0.2)
BASOPHILS NFR BLD: 0 % (ref 0–2)
BUN SERPL-MCNC: 14 MG/DL (ref 6–23)
CALCIUM SERPL-MCNC: 9.4 MG/DL (ref 8.3–10.4)
CHLORIDE SERPL-SCNC: 110 MMOL/L (ref 103–113)
CO2 SERPL-SCNC: 25 MMOL/L (ref 21–32)
CREAT SERPL-MCNC: 0.8 MG/DL (ref 0.6–1)
DIFFERENTIAL METHOD BLD: ABNORMAL
EOSINOPHIL # BLD: 0 K/UL (ref 0–0.8)
EOSINOPHIL NFR BLD: 0 % (ref 0.5–7.8)
ERYTHROCYTE [DISTWIDTH] IN BLOOD BY AUTOMATED COUNT: 14 % (ref 11.9–14.6)
EST. AVERAGE GLUCOSE BLD GHB EST-MCNC: 100 MG/DL
GLUCOSE SERPL-MCNC: 133 MG/DL (ref 65–100)
HBA1C MFR BLD: 5.1 % (ref 4.8–5.6)
HCT VFR BLD AUTO: 41.1 % (ref 35.8–46.3)
HGB BLD-MCNC: 13.4 G/DL (ref 11.7–15.4)
IMM GRANULOCYTES # BLD AUTO: 0.1 K/UL (ref 0–0.5)
IMM GRANULOCYTES NFR BLD AUTO: 0 % (ref 0–5)
LYMPHOCYTES # BLD: 0.5 K/UL (ref 0.5–4.6)
LYMPHOCYTES NFR BLD: 3 % (ref 13–44)
MCH RBC QN AUTO: 27.9 PG (ref 26.1–32.9)
MCHC RBC AUTO-ENTMCNC: 32.6 G/DL (ref 31.4–35)
MCV RBC AUTO: 85.6 FL (ref 82–102)
MM INDURATION, POC: 0 MM (ref 0–5)
MONOCYTES # BLD: 0.5 K/UL (ref 0.1–1.3)
MONOCYTES NFR BLD: 4 % (ref 4–12)
NEUTS SEG # BLD: 13.5 K/UL (ref 1.7–8.2)
NEUTS SEG NFR BLD: 93 % (ref 43–78)
NRBC # BLD: 0 K/UL (ref 0–0.2)
PLATELET # BLD AUTO: 244 K/UL (ref 150–450)
PMV BLD AUTO: 9.4 FL (ref 9.4–12.3)
POTASSIUM SERPL-SCNC: 4.1 MMOL/L (ref 3.5–5.1)
PPD, POC: NEGATIVE
RBC # BLD AUTO: 4.8 M/UL (ref 4.05–5.2)
SODIUM SERPL-SCNC: 140 MMOL/L (ref 136–146)
WBC # BLD AUTO: 14.6 K/UL (ref 4.3–11.1)

## 2024-04-19 PROCEDURE — 6360000002 HC RX W HCPCS: Performed by: ORTHOPAEDIC SURGERY

## 2024-04-19 PROCEDURE — 83036 HEMOGLOBIN GLYCOSYLATED A1C: CPT

## 2024-04-19 PROCEDURE — 97530 THERAPEUTIC ACTIVITIES: CPT

## 2024-04-19 PROCEDURE — 6370000000 HC RX 637 (ALT 250 FOR IP): Performed by: STUDENT IN AN ORGANIZED HEALTH CARE EDUCATION/TRAINING PROGRAM

## 2024-04-19 PROCEDURE — 36415 COLL VENOUS BLD VENIPUNCTURE: CPT

## 2024-04-19 PROCEDURE — 1100000000 HC RM PRIVATE

## 2024-04-19 PROCEDURE — 6370000000 HC RX 637 (ALT 250 FOR IP): Performed by: ORTHOPAEDIC SURGERY

## 2024-04-19 PROCEDURE — 97535 SELF CARE MNGMENT TRAINING: CPT

## 2024-04-19 PROCEDURE — 97166 OT EVAL MOD COMPLEX 45 MIN: CPT

## 2024-04-19 PROCEDURE — 85025 COMPLETE CBC W/AUTO DIFF WBC: CPT

## 2024-04-19 PROCEDURE — 2580000003 HC RX 258: Performed by: ORTHOPAEDIC SURGERY

## 2024-04-19 PROCEDURE — 80048 BASIC METABOLIC PNL TOTAL CA: CPT

## 2024-04-19 PROCEDURE — 97161 PT EVAL LOW COMPLEX 20 MIN: CPT

## 2024-04-19 RX ADMIN — FAMOTIDINE 20 MG: 20 TABLET, FILM COATED ORAL at 21:33

## 2024-04-19 RX ADMIN — Medication 3 MG: at 21:33

## 2024-04-19 RX ADMIN — SODIUM CHLORIDE, PRESERVATIVE FREE 5 ML: 5 INJECTION INTRAVENOUS at 21:54

## 2024-04-19 RX ADMIN — DEXAMETHASONE SODIUM PHOSPHATE 8 MG: 10 INJECTION INTRAMUSCULAR; INTRAVENOUS at 14:12

## 2024-04-19 RX ADMIN — FAMOTIDINE 20 MG: 20 TABLET, FILM COATED ORAL at 09:15

## 2024-04-19 RX ADMIN — TRAZODONE HYDROCHLORIDE 150 MG: 50 TABLET ORAL at 21:33

## 2024-04-19 RX ADMIN — SODIUM CHLORIDE: 9 INJECTION, SOLUTION INTRAVENOUS at 06:22

## 2024-04-19 RX ADMIN — DICLOFENAC SODIUM 4 G: 10 GEL TOPICAL at 15:08

## 2024-04-19 RX ADMIN — DICLOFENAC SODIUM 4 G: 10 GEL TOPICAL at 11:36

## 2024-04-19 RX ADMIN — FLUOXETINE HYDROCHLORIDE 20 MG: 20 CAPSULE ORAL at 09:15

## 2024-04-19 RX ADMIN — SODIUM CHLORIDE, PRESERVATIVE FREE 10 ML: 5 INJECTION INTRAVENOUS at 09:15

## 2024-04-19 RX ADMIN — GABAPENTIN 300 MG: 300 CAPSULE ORAL at 21:33

## 2024-04-19 RX ADMIN — GABAPENTIN 300 MG: 300 CAPSULE ORAL at 14:12

## 2024-04-19 RX ADMIN — CEFAZOLIN 2000 MG: 10 INJECTION, POWDER, FOR SOLUTION INTRAVENOUS at 03:07

## 2024-04-19 RX ADMIN — DEXAMETHASONE SODIUM PHOSPHATE 8 MG: 10 INJECTION INTRAMUSCULAR; INTRAVENOUS at 03:13

## 2024-04-19 RX ADMIN — HYDROCODONE BITARTRATE AND ACETAMINOPHEN 1 TABLET: 7.5; 325 TABLET ORAL at 03:12

## 2024-04-19 RX ADMIN — GABAPENTIN 300 MG: 300 CAPSULE ORAL at 09:15

## 2024-04-19 RX ADMIN — OXYBUTYNIN CHLORIDE 10 MG: 10 TABLET, EXTENDED RELEASE ORAL at 21:33

## 2024-04-19 ASSESSMENT — PAIN DESCRIPTION - FREQUENCY: FREQUENCY: INTERMITTENT

## 2024-04-19 ASSESSMENT — PAIN - FUNCTIONAL ASSESSMENT
PAIN_FUNCTIONAL_ASSESSMENT: ACTIVITIES ARE NOT PREVENTED
PAIN_FUNCTIONAL_ASSESSMENT: ACTIVITIES ARE NOT PREVENTED

## 2024-04-19 ASSESSMENT — PAIN DESCRIPTION - PAIN TYPE: TYPE: ACUTE PAIN;SURGICAL PAIN

## 2024-04-19 ASSESSMENT — PAIN DESCRIPTION - LOCATION: LOCATION: NECK;BACK;GENERALIZED

## 2024-04-19 ASSESSMENT — PAIN SCALES - GENERAL
PAINLEVEL_OUTOF10: 0
PAINLEVEL_OUTOF10: 6

## 2024-04-19 ASSESSMENT — PAIN DESCRIPTION - DESCRIPTORS: DESCRIPTORS: ACHING;SORE

## 2024-04-19 ASSESSMENT — PAIN DESCRIPTION - ONSET: ONSET: ON-GOING

## 2024-04-19 NOTE — ACP (ADVANCE CARE PLANNING)
Advance Care Planning   Healthcare Decision Maker:    Primary Decision Maker: Apryl Kramer - Child - [phone]    Primary Decision Maker: Jaylen Kramer - Child - [phone]    Pt is a full code.    Click here to complete Healthcare Decision Makers including selection of the Healthcare Decision Maker Relationship (ie \"Primary\").

## 2024-04-20 LAB
ANION GAP SERPL CALC-SCNC: 4 MMOL/L (ref 2–11)
BUN SERPL-MCNC: 18 MG/DL (ref 6–23)
CALCIUM SERPL-MCNC: 9.2 MG/DL (ref 8.3–10.4)
CHLORIDE SERPL-SCNC: 109 MMOL/L (ref 103–113)
CO2 SERPL-SCNC: 27 MMOL/L (ref 21–32)
CREAT SERPL-MCNC: 0.6 MG/DL (ref 0.6–1)
ERYTHROCYTE [DISTWIDTH] IN BLOOD BY AUTOMATED COUNT: 14.3 % (ref 11.9–14.6)
GLUCOSE SERPL-MCNC: 116 MG/DL (ref 65–100)
HCT VFR BLD AUTO: 38.9 % (ref 35.8–46.3)
HGB BLD-MCNC: 12.4 G/DL (ref 11.7–15.4)
MCH RBC QN AUTO: 27.4 PG (ref 26.1–32.9)
MCHC RBC AUTO-ENTMCNC: 31.9 G/DL (ref 31.4–35)
MCV RBC AUTO: 86.1 FL (ref 82–102)
MM INDURATION, POC: 0 MM (ref 0–5)
NRBC # BLD: 0 K/UL (ref 0–0.2)
PLATELET # BLD AUTO: 246 K/UL (ref 150–450)
PMV BLD AUTO: 9.5 FL (ref 9.4–12.3)
POTASSIUM SERPL-SCNC: 4 MMOL/L (ref 3.5–5.1)
PPD, POC: NEGATIVE
RBC # BLD AUTO: 4.52 M/UL (ref 4.05–5.2)
SODIUM SERPL-SCNC: 140 MMOL/L (ref 136–146)
WBC # BLD AUTO: 13 K/UL (ref 4.3–11.1)

## 2024-04-20 PROCEDURE — 1100000000 HC RM PRIVATE

## 2024-04-20 PROCEDURE — 97530 THERAPEUTIC ACTIVITIES: CPT

## 2024-04-20 PROCEDURE — 97116 GAIT TRAINING THERAPY: CPT

## 2024-04-20 PROCEDURE — 85027 COMPLETE CBC AUTOMATED: CPT

## 2024-04-20 PROCEDURE — 2580000003 HC RX 258: Performed by: ORTHOPAEDIC SURGERY

## 2024-04-20 PROCEDURE — 6370000000 HC RX 637 (ALT 250 FOR IP): Performed by: STUDENT IN AN ORGANIZED HEALTH CARE EDUCATION/TRAINING PROGRAM

## 2024-04-20 PROCEDURE — 80048 BASIC METABOLIC PNL TOTAL CA: CPT

## 2024-04-20 PROCEDURE — 36415 COLL VENOUS BLD VENIPUNCTURE: CPT

## 2024-04-20 PROCEDURE — 6370000000 HC RX 637 (ALT 250 FOR IP): Performed by: ORTHOPAEDIC SURGERY

## 2024-04-20 RX ORDER — MENTHOL 1.4 %
ADHESIVE PATCH, MEDICATED TOPICAL 3 TIMES DAILY PRN
Status: DISCONTINUED | OUTPATIENT
Start: 2024-04-20 | End: 2024-04-30 | Stop reason: HOSPADM

## 2024-04-20 RX ORDER — POLYETHYLENE GLYCOL 3350 17 G/17G
17 POWDER, FOR SOLUTION ORAL DAILY
Status: DISCONTINUED | OUTPATIENT
Start: 2024-04-20 | End: 2024-04-22

## 2024-04-20 RX ADMIN — GABAPENTIN 300 MG: 300 CAPSULE ORAL at 09:40

## 2024-04-20 RX ADMIN — GABAPENTIN 300 MG: 300 CAPSULE ORAL at 21:32

## 2024-04-20 RX ADMIN — FAMOTIDINE 20 MG: 20 TABLET, FILM COATED ORAL at 09:40

## 2024-04-20 RX ADMIN — SODIUM CHLORIDE, PRESERVATIVE FREE 5 ML: 5 INJECTION INTRAVENOUS at 21:32

## 2024-04-20 RX ADMIN — FLUOXETINE HYDROCHLORIDE 20 MG: 20 CAPSULE ORAL at 09:40

## 2024-04-20 RX ADMIN — GABAPENTIN 300 MG: 300 CAPSULE ORAL at 15:07

## 2024-04-20 RX ADMIN — FAMOTIDINE 20 MG: 20 TABLET, FILM COATED ORAL at 21:32

## 2024-04-20 RX ADMIN — POLYETHYLENE GLYCOL 3350 17 G: 17 POWDER, FOR SOLUTION ORAL at 09:41

## 2024-04-20 RX ADMIN — Medication 3 MG: at 21:32

## 2024-04-20 RX ADMIN — TRAZODONE HYDROCHLORIDE 150 MG: 50 TABLET ORAL at 21:32

## 2024-04-20 RX ADMIN — OXYBUTYNIN CHLORIDE 10 MG: 10 TABLET, EXTENDED RELEASE ORAL at 21:32

## 2024-04-20 RX ADMIN — DOXYLAMINE SUCCINATE: 25 TABLET ORAL at 16:23

## 2024-04-20 RX ADMIN — SODIUM CHLORIDE, PRESERVATIVE FREE 10 ML: 5 INJECTION INTRAVENOUS at 09:41

## 2024-04-20 ASSESSMENT — PAIN SCALES - GENERAL
PAINLEVEL_OUTOF10: 0
PAINLEVEL_OUTOF10: 0

## 2024-04-21 LAB
ANION GAP SERPL CALC-SCNC: 5 MMOL/L (ref 2–11)
BUN SERPL-MCNC: 15 MG/DL (ref 6–23)
CALCIUM SERPL-MCNC: 8.5 MG/DL (ref 8.3–10.4)
CHLORIDE SERPL-SCNC: 111 MMOL/L (ref 103–113)
CO2 SERPL-SCNC: 21 MMOL/L (ref 21–32)
CREAT SERPL-MCNC: 0.6 MG/DL (ref 0.6–1)
ERYTHROCYTE [DISTWIDTH] IN BLOOD BY AUTOMATED COUNT: 14.3 % (ref 11.9–14.6)
GLUCOSE SERPL-MCNC: 80 MG/DL (ref 65–100)
HCT VFR BLD AUTO: 41.8 % (ref 35.8–46.3)
HGB BLD-MCNC: 13.2 G/DL (ref 11.7–15.4)
MCH RBC QN AUTO: 27.6 PG (ref 26.1–32.9)
MCHC RBC AUTO-ENTMCNC: 31.6 G/DL (ref 31.4–35)
MCV RBC AUTO: 87.3 FL (ref 82–102)
NRBC # BLD: 0 K/UL (ref 0–0.2)
PLATELET # BLD AUTO: 220 K/UL (ref 150–450)
PMV BLD AUTO: 9.3 FL (ref 9.4–12.3)
POTASSIUM SERPL-SCNC: 3.7 MMOL/L (ref 3.5–5.1)
POTASSIUM SERPL-SCNC: 5.8 MMOL/L (ref 3.5–5.1)
RBC # BLD AUTO: 4.79 M/UL (ref 4.05–5.2)
SODIUM SERPL-SCNC: 137 MMOL/L (ref 136–146)
WBC # BLD AUTO: 10.3 K/UL (ref 4.3–11.1)

## 2024-04-21 PROCEDURE — 84132 ASSAY OF SERUM POTASSIUM: CPT

## 2024-04-21 PROCEDURE — 6370000000 HC RX 637 (ALT 250 FOR IP): Performed by: STUDENT IN AN ORGANIZED HEALTH CARE EDUCATION/TRAINING PROGRAM

## 2024-04-21 PROCEDURE — 2580000003 HC RX 258: Performed by: ORTHOPAEDIC SURGERY

## 2024-04-21 PROCEDURE — 6370000000 HC RX 637 (ALT 250 FOR IP): Performed by: HOSPITALIST

## 2024-04-21 PROCEDURE — 80048 BASIC METABOLIC PNL TOTAL CA: CPT

## 2024-04-21 PROCEDURE — 1100000000 HC RM PRIVATE

## 2024-04-21 PROCEDURE — 6370000000 HC RX 637 (ALT 250 FOR IP): Performed by: ORTHOPAEDIC SURGERY

## 2024-04-21 PROCEDURE — 36415 COLL VENOUS BLD VENIPUNCTURE: CPT

## 2024-04-21 PROCEDURE — 97530 THERAPEUTIC ACTIVITIES: CPT

## 2024-04-21 PROCEDURE — 85027 COMPLETE CBC AUTOMATED: CPT

## 2024-04-21 RX ADMIN — DOCUSATE SODIUM 50 MG AND SENNOSIDES 8.6 MG 1 TABLET: 8.6; 5 TABLET, FILM COATED ORAL at 09:01

## 2024-04-21 RX ADMIN — FAMOTIDINE 20 MG: 20 TABLET, FILM COATED ORAL at 21:17

## 2024-04-21 RX ADMIN — SODIUM CHLORIDE, PRESERVATIVE FREE 10 ML: 5 INJECTION INTRAVENOUS at 21:18

## 2024-04-21 RX ADMIN — HYDROCODONE BITARTRATE AND ACETAMINOPHEN 1 TABLET: 7.5; 325 TABLET ORAL at 21:20

## 2024-04-21 RX ADMIN — GABAPENTIN 300 MG: 300 CAPSULE ORAL at 21:17

## 2024-04-21 RX ADMIN — SODIUM CHLORIDE, PRESERVATIVE FREE 10 ML: 5 INJECTION INTRAVENOUS at 09:04

## 2024-04-21 RX ADMIN — Medication 3 MG: at 21:17

## 2024-04-21 RX ADMIN — POLYETHYLENE GLYCOL 3350 17 G: 17 POWDER, FOR SOLUTION ORAL at 09:01

## 2024-04-21 RX ADMIN — OXYBUTYNIN CHLORIDE 10 MG: 10 TABLET, EXTENDED RELEASE ORAL at 21:17

## 2024-04-21 RX ADMIN — HYDROCODONE BITARTRATE AND ACETAMINOPHEN 1 TABLET: 7.5; 325 TABLET ORAL at 09:00

## 2024-04-21 RX ADMIN — GABAPENTIN 300 MG: 300 CAPSULE ORAL at 09:00

## 2024-04-21 RX ADMIN — TRAZODONE HYDROCHLORIDE 150 MG: 50 TABLET ORAL at 21:17

## 2024-04-21 RX ADMIN — DOCUSATE SODIUM 50 MG AND SENNOSIDES 8.6 MG 1 TABLET: 8.6; 5 TABLET, FILM COATED ORAL at 21:17

## 2024-04-21 RX ADMIN — GABAPENTIN 300 MG: 300 CAPSULE ORAL at 14:53

## 2024-04-21 RX ADMIN — HYDROCODONE BITARTRATE AND ACETAMINOPHEN 1 TABLET: 7.5; 325 TABLET ORAL at 17:41

## 2024-04-21 RX ADMIN — FAMOTIDINE 20 MG: 20 TABLET, FILM COATED ORAL at 09:01

## 2024-04-21 ASSESSMENT — PAIN DESCRIPTION - LOCATION
LOCATION: NECK

## 2024-04-21 ASSESSMENT — PAIN DESCRIPTION - PAIN TYPE: TYPE: SURGICAL PAIN

## 2024-04-21 ASSESSMENT — PAIN SCALES - GENERAL
PAINLEVEL_OUTOF10: 0
PAINLEVEL_OUTOF10: 6
PAINLEVEL_OUTOF10: 6
PAINLEVEL_OUTOF10: 0
PAINLEVEL_OUTOF10: 6
PAINLEVEL_OUTOF10: 0

## 2024-04-21 ASSESSMENT — PAIN DESCRIPTION - DESCRIPTORS
DESCRIPTORS: ACHING;SHARP
DESCRIPTORS: ACHING
DESCRIPTORS: ACHING

## 2024-04-21 ASSESSMENT — PAIN DESCRIPTION - ORIENTATION
ORIENTATION: MID
ORIENTATION: POSTERIOR
ORIENTATION: MID

## 2024-04-21 ASSESSMENT — PAIN - FUNCTIONAL ASSESSMENT: PAIN_FUNCTIONAL_ASSESSMENT: ACTIVITIES ARE NOT PREVENTED

## 2024-04-21 ASSESSMENT — PAIN DESCRIPTION - ONSET: ONSET: GRADUAL

## 2024-04-21 ASSESSMENT — PAIN DESCRIPTION - FREQUENCY: FREQUENCY: INTERMITTENT

## 2024-04-22 PROCEDURE — 6370000000 HC RX 637 (ALT 250 FOR IP): Performed by: HOSPITALIST

## 2024-04-22 PROCEDURE — 2580000003 HC RX 258: Performed by: ORTHOPAEDIC SURGERY

## 2024-04-22 PROCEDURE — 97530 THERAPEUTIC ACTIVITIES: CPT

## 2024-04-22 PROCEDURE — 1100000000 HC RM PRIVATE

## 2024-04-22 PROCEDURE — 6370000000 HC RX 637 (ALT 250 FOR IP): Performed by: ORTHOPAEDIC SURGERY

## 2024-04-22 PROCEDURE — 97116 GAIT TRAINING THERAPY: CPT

## 2024-04-22 PROCEDURE — 97535 SELF CARE MNGMENT TRAINING: CPT

## 2024-04-22 RX ORDER — POLYETHYLENE GLYCOL 3350 17 G/17G
17 POWDER, FOR SOLUTION ORAL DAILY PRN
Status: DISCONTINUED | OUTPATIENT
Start: 2024-04-22 | End: 2024-04-23

## 2024-04-22 RX ADMIN — SODIUM CHLORIDE, PRESERVATIVE FREE 10 ML: 5 INJECTION INTRAVENOUS at 20:55

## 2024-04-22 RX ADMIN — TRAZODONE HYDROCHLORIDE 150 MG: 50 TABLET ORAL at 20:52

## 2024-04-22 RX ADMIN — FLUOXETINE HYDROCHLORIDE 20 MG: 20 CAPSULE ORAL at 08:57

## 2024-04-22 RX ADMIN — HYDROCODONE BITARTRATE AND ACETAMINOPHEN 1 TABLET: 7.5; 325 TABLET ORAL at 05:41

## 2024-04-22 RX ADMIN — DOCUSATE SODIUM 50 MG AND SENNOSIDES 8.6 MG 1 TABLET: 8.6; 5 TABLET, FILM COATED ORAL at 20:52

## 2024-04-22 RX ADMIN — OXYBUTYNIN CHLORIDE 10 MG: 10 TABLET, EXTENDED RELEASE ORAL at 20:51

## 2024-04-22 RX ADMIN — DOCUSATE SODIUM 50 MG AND SENNOSIDES 8.6 MG 1 TABLET: 8.6; 5 TABLET, FILM COATED ORAL at 08:56

## 2024-04-22 RX ADMIN — FAMOTIDINE 20 MG: 20 TABLET, FILM COATED ORAL at 20:52

## 2024-04-22 RX ADMIN — FAMOTIDINE 20 MG: 20 TABLET, FILM COATED ORAL at 08:57

## 2024-04-22 RX ADMIN — HYDROCODONE BITARTRATE AND ACETAMINOPHEN 1 TABLET: 7.5; 325 TABLET ORAL at 20:52

## 2024-04-22 RX ADMIN — HYDROCODONE BITARTRATE AND ACETAMINOPHEN 1 TABLET: 7.5; 325 TABLET ORAL at 13:02

## 2024-04-22 RX ADMIN — SODIUM CHLORIDE, PRESERVATIVE FREE 10 ML: 5 INJECTION INTRAVENOUS at 08:57

## 2024-04-22 RX ADMIN — GABAPENTIN 300 MG: 300 CAPSULE ORAL at 20:52

## 2024-04-22 RX ADMIN — GABAPENTIN 300 MG: 300 CAPSULE ORAL at 14:29

## 2024-04-22 RX ADMIN — GABAPENTIN 300 MG: 300 CAPSULE ORAL at 08:56

## 2024-04-22 RX ADMIN — Medication 3 MG: at 20:52

## 2024-04-22 ASSESSMENT — PAIN DESCRIPTION - PAIN TYPE
TYPE: SURGICAL PAIN
TYPE: SURGICAL PAIN

## 2024-04-22 ASSESSMENT — PAIN SCALES - GENERAL
PAINLEVEL_OUTOF10: 2
PAINLEVEL_OUTOF10: 0
PAINLEVEL_OUTOF10: 0
PAINLEVEL_OUTOF10: 5
PAINLEVEL_OUTOF10: 5
PAINLEVEL_OUTOF10: 0
PAINLEVEL_OUTOF10: 6

## 2024-04-22 ASSESSMENT — PAIN DESCRIPTION - ORIENTATION
ORIENTATION: MID
ORIENTATION: POSTERIOR
ORIENTATION: MID

## 2024-04-22 ASSESSMENT — PAIN DESCRIPTION - DESCRIPTORS
DESCRIPTORS: ACHING;DISCOMFORT
DESCRIPTORS: ACHING
DESCRIPTORS: ACHING;SHARP

## 2024-04-22 ASSESSMENT — PAIN DESCRIPTION - LOCATION
LOCATION: NECK

## 2024-04-22 ASSESSMENT — PAIN - FUNCTIONAL ASSESSMENT
PAIN_FUNCTIONAL_ASSESSMENT: PREVENTS OR INTERFERES SOME ACTIVE ACTIVITIES AND ADLS
PAIN_FUNCTIONAL_ASSESSMENT: ACTIVITIES ARE NOT PREVENTED

## 2024-04-22 ASSESSMENT — PAIN DESCRIPTION - ONSET
ONSET: GRADUAL
ONSET: GRADUAL

## 2024-04-22 ASSESSMENT — PAIN DESCRIPTION - FREQUENCY
FREQUENCY: INTERMITTENT
FREQUENCY: INTERMITTENT

## 2024-04-22 NOTE — CARE COORDINATION
[facility] and [facility] declined to accept pt for STR.  [facility] accepted the pt for admission pending insurance approval and Medicaid LOC approval.  Facility will initiate the insurance auth request today.   initiated the Medicaid LOC request via the [facility] online portal.  Confirmation #[id number].

## 2024-04-23 PROCEDURE — 2580000003 HC RX 258: Performed by: ORTHOPAEDIC SURGERY

## 2024-04-23 PROCEDURE — 6370000000 HC RX 637 (ALT 250 FOR IP): Performed by: ORTHOPAEDIC SURGERY

## 2024-04-23 PROCEDURE — 97535 SELF CARE MNGMENT TRAINING: CPT

## 2024-04-23 PROCEDURE — 97530 THERAPEUTIC ACTIVITIES: CPT

## 2024-04-23 PROCEDURE — 6370000000 HC RX 637 (ALT 250 FOR IP): Performed by: STUDENT IN AN ORGANIZED HEALTH CARE EDUCATION/TRAINING PROGRAM

## 2024-04-23 PROCEDURE — 1100000000 HC RM PRIVATE

## 2024-04-23 RX ORDER — SENNA AND DOCUSATE SODIUM 50; 8.6 MG/1; MG/1
1 TABLET, FILM COATED ORAL DAILY PRN
Status: DISCONTINUED | OUTPATIENT
Start: 2024-04-23 | End: 2024-04-30 | Stop reason: HOSPADM

## 2024-04-23 RX ORDER — POLYETHYLENE GLYCOL 3350 17 G/17G
17 POWDER, FOR SOLUTION ORAL DAILY
Status: DISCONTINUED | OUTPATIENT
Start: 2024-04-23 | End: 2024-04-30 | Stop reason: HOSPADM

## 2024-04-23 RX ADMIN — GABAPENTIN 300 MG: 300 CAPSULE ORAL at 08:53

## 2024-04-23 RX ADMIN — FAMOTIDINE 20 MG: 20 TABLET, FILM COATED ORAL at 08:53

## 2024-04-23 RX ADMIN — FLUOXETINE HYDROCHLORIDE 20 MG: 20 CAPSULE ORAL at 08:53

## 2024-04-23 RX ADMIN — OXYBUTYNIN CHLORIDE 10 MG: 10 TABLET, EXTENDED RELEASE ORAL at 20:27

## 2024-04-23 RX ADMIN — HYDROCODONE BITARTRATE AND ACETAMINOPHEN 1 TABLET: 7.5; 325 TABLET ORAL at 05:53

## 2024-04-23 RX ADMIN — FAMOTIDINE 20 MG: 20 TABLET, FILM COATED ORAL at 20:26

## 2024-04-23 RX ADMIN — Medication 3 MG: at 20:27

## 2024-04-23 RX ADMIN — SODIUM CHLORIDE, PRESERVATIVE FREE 10 ML: 5 INJECTION INTRAVENOUS at 20:27

## 2024-04-23 RX ADMIN — DICLOFENAC SODIUM 4 G: 10 GEL TOPICAL at 09:04

## 2024-04-23 RX ADMIN — GABAPENTIN 300 MG: 300 CAPSULE ORAL at 20:26

## 2024-04-23 RX ADMIN — SODIUM CHLORIDE, PRESERVATIVE FREE 10 ML: 5 INJECTION INTRAVENOUS at 08:54

## 2024-04-23 RX ADMIN — TRAZODONE HYDROCHLORIDE 150 MG: 50 TABLET ORAL at 20:27

## 2024-04-23 RX ADMIN — POLYETHYLENE GLYCOL 3350 17 G: 17 POWDER, FOR SOLUTION ORAL at 08:57

## 2024-04-23 RX ADMIN — HYDROCODONE BITARTRATE AND ACETAMINOPHEN 1 TABLET: 7.5; 325 TABLET ORAL at 18:18

## 2024-04-23 RX ADMIN — GABAPENTIN 300 MG: 300 CAPSULE ORAL at 14:10

## 2024-04-23 ASSESSMENT — PAIN DESCRIPTION - LOCATION
LOCATION: NECK
LOCATION: NECK

## 2024-04-23 ASSESSMENT — PAIN SCALES - GENERAL
PAINLEVEL_OUTOF10: 6
PAINLEVEL_OUTOF10: 2
PAINLEVEL_OUTOF10: 6
PAINLEVEL_OUTOF10: 0
PAINLEVEL_OUTOF10: 0

## 2024-04-23 ASSESSMENT — PAIN DESCRIPTION - DESCRIPTORS
DESCRIPTORS: ACHING
DESCRIPTORS: ACHING

## 2024-04-23 ASSESSMENT — PAIN DESCRIPTION - ORIENTATION
ORIENTATION: MID
ORIENTATION: MID

## 2024-04-23 ASSESSMENT — PAIN DESCRIPTION - FREQUENCY: FREQUENCY: INTERMITTENT

## 2024-04-23 ASSESSMENT — PAIN - FUNCTIONAL ASSESSMENT: PAIN_FUNCTIONAL_ASSESSMENT: PREVENTS OR INTERFERES WITH MANY ACTIVE NOT PASSIVE ACTIVITIES

## 2024-04-23 ASSESSMENT — PAIN DESCRIPTION - PAIN TYPE: TYPE: SURGICAL PAIN

## 2024-04-23 ASSESSMENT — PAIN DESCRIPTION - ONSET: ONSET: GRADUAL

## 2024-04-23 NOTE — CARE COORDINATION
PIERRE met with pt and spoke with pt's sister, Mya, via phone, to update them re: bed offer at [facility] pending insurance approval.  They are in agreement with this plan.  PIERRE spoke with Gilda with [facility] and she confirmed the pt has tentative Medicaid LOC approval.  [facility] approval still pending.  CM following.

## 2024-04-24 PROCEDURE — 6370000000 HC RX 637 (ALT 250 FOR IP): Performed by: ORTHOPAEDIC SURGERY

## 2024-04-24 PROCEDURE — 97530 THERAPEUTIC ACTIVITIES: CPT

## 2024-04-24 PROCEDURE — 97112 NEUROMUSCULAR REEDUCATION: CPT

## 2024-04-24 PROCEDURE — 2580000003 HC RX 258: Performed by: ORTHOPAEDIC SURGERY

## 2024-04-24 PROCEDURE — 97116 GAIT TRAINING THERAPY: CPT

## 2024-04-24 PROCEDURE — 6370000000 HC RX 637 (ALT 250 FOR IP): Performed by: STUDENT IN AN ORGANIZED HEALTH CARE EDUCATION/TRAINING PROGRAM

## 2024-04-24 PROCEDURE — 97535 SELF CARE MNGMENT TRAINING: CPT

## 2024-04-24 PROCEDURE — 1100000000 HC RM PRIVATE

## 2024-04-24 RX ADMIN — GABAPENTIN 300 MG: 300 CAPSULE ORAL at 14:03

## 2024-04-24 RX ADMIN — FAMOTIDINE 20 MG: 20 TABLET, FILM COATED ORAL at 10:07

## 2024-04-24 RX ADMIN — GABAPENTIN 300 MG: 300 CAPSULE ORAL at 10:07

## 2024-04-24 RX ADMIN — GABAPENTIN 300 MG: 300 CAPSULE ORAL at 21:43

## 2024-04-24 RX ADMIN — FLUOXETINE HYDROCHLORIDE 20 MG: 20 CAPSULE ORAL at 10:07

## 2024-04-24 RX ADMIN — SODIUM CHLORIDE, PRESERVATIVE FREE 10 ML: 5 INJECTION INTRAVENOUS at 10:13

## 2024-04-24 RX ADMIN — Medication 3 MG: at 21:42

## 2024-04-24 RX ADMIN — OXYBUTYNIN CHLORIDE 10 MG: 10 TABLET, EXTENDED RELEASE ORAL at 21:42

## 2024-04-24 RX ADMIN — SODIUM CHLORIDE, PRESERVATIVE FREE 10 ML: 5 INJECTION INTRAVENOUS at 21:44

## 2024-04-24 RX ADMIN — FAMOTIDINE 20 MG: 20 TABLET, FILM COATED ORAL at 21:43

## 2024-04-24 RX ADMIN — HYDROCODONE BITARTRATE AND ACETAMINOPHEN 1 TABLET: 7.5; 325 TABLET ORAL at 05:07

## 2024-04-24 RX ADMIN — HYDROCODONE BITARTRATE AND ACETAMINOPHEN 1 TABLET: 7.5; 325 TABLET ORAL at 21:41

## 2024-04-24 RX ADMIN — POLYETHYLENE GLYCOL 3350 17 G: 17 POWDER, FOR SOLUTION ORAL at 10:07

## 2024-04-24 RX ADMIN — TRAZODONE HYDROCHLORIDE 150 MG: 50 TABLET ORAL at 21:42

## 2024-04-24 RX ADMIN — HYDROCODONE BITARTRATE AND ACETAMINOPHEN 1 TABLET: 7.5; 325 TABLET ORAL at 16:17

## 2024-04-24 ASSESSMENT — PAIN DESCRIPTION - LOCATION
LOCATION: NECK

## 2024-04-24 ASSESSMENT — PAIN DESCRIPTION - FREQUENCY
FREQUENCY: CONTINUOUS
FREQUENCY: INTERMITTENT

## 2024-04-24 ASSESSMENT — PAIN DESCRIPTION - ORIENTATION
ORIENTATION: MID
ORIENTATION: POSTERIOR
ORIENTATION: MID

## 2024-04-24 ASSESSMENT — PAIN SCALES - GENERAL
PAINLEVEL_OUTOF10: 2
PAINLEVEL_OUTOF10: 6
PAINLEVEL_OUTOF10: 6
PAINLEVEL_OUTOF10: 0
PAINLEVEL_OUTOF10: 0
PAINLEVEL_OUTOF10: 6

## 2024-04-24 ASSESSMENT — PAIN DESCRIPTION - DIRECTION: RADIATING_TOWARDS: NECK

## 2024-04-24 ASSESSMENT — PAIN - FUNCTIONAL ASSESSMENT
PAIN_FUNCTIONAL_ASSESSMENT: PREVENTS OR INTERFERES SOME ACTIVE ACTIVITIES AND ADLS
PAIN_FUNCTIONAL_ASSESSMENT: PREVENTS OR INTERFERES SOME ACTIVE ACTIVITIES AND ADLS

## 2024-04-24 ASSESSMENT — PAIN DESCRIPTION - ONSET
ONSET: GRADUAL
ONSET: ON-GOING

## 2024-04-24 ASSESSMENT — PAIN DESCRIPTION - PAIN TYPE
TYPE: ACUTE PAIN;CHRONIC PAIN;SURGICAL PAIN
TYPE: ACUTE PAIN;CHRONIC PAIN;SURGICAL PAIN

## 2024-04-24 ASSESSMENT — PAIN DESCRIPTION - DESCRIPTORS
DESCRIPTORS: THROBBING
DESCRIPTORS: ACHING
DESCRIPTORS: ACHING;DISCOMFORT

## 2024-04-25 PROCEDURE — 6370000000 HC RX 637 (ALT 250 FOR IP): Performed by: ORTHOPAEDIC SURGERY

## 2024-04-25 PROCEDURE — 97530 THERAPEUTIC ACTIVITIES: CPT

## 2024-04-25 PROCEDURE — 2580000003 HC RX 258: Performed by: ORTHOPAEDIC SURGERY

## 2024-04-25 PROCEDURE — 1100000000 HC RM PRIVATE

## 2024-04-25 PROCEDURE — 97116 GAIT TRAINING THERAPY: CPT

## 2024-04-25 PROCEDURE — 97110 THERAPEUTIC EXERCISES: CPT

## 2024-04-25 PROCEDURE — 6370000000 HC RX 637 (ALT 250 FOR IP): Performed by: STUDENT IN AN ORGANIZED HEALTH CARE EDUCATION/TRAINING PROGRAM

## 2024-04-25 RX ADMIN — DICLOFENAC SODIUM 4 G: 10 GEL TOPICAL at 11:08

## 2024-04-25 RX ADMIN — GABAPENTIN 300 MG: 300 CAPSULE ORAL at 08:30

## 2024-04-25 RX ADMIN — GABAPENTIN 300 MG: 300 CAPSULE ORAL at 21:29

## 2024-04-25 RX ADMIN — POLYETHYLENE GLYCOL 3350 17 G: 17 POWDER, FOR SOLUTION ORAL at 08:27

## 2024-04-25 RX ADMIN — HYDROCODONE BITARTRATE AND ACETAMINOPHEN 1 TABLET: 7.5; 325 TABLET ORAL at 12:54

## 2024-04-25 RX ADMIN — FAMOTIDINE 20 MG: 20 TABLET, FILM COATED ORAL at 08:27

## 2024-04-25 RX ADMIN — Medication 3 MG: at 21:29

## 2024-04-25 RX ADMIN — HYDROCODONE BITARTRATE AND ACETAMINOPHEN 1 TABLET: 7.5; 325 TABLET ORAL at 21:29

## 2024-04-25 RX ADMIN — TRAZODONE HYDROCHLORIDE 150 MG: 50 TABLET ORAL at 21:29

## 2024-04-25 RX ADMIN — GABAPENTIN 300 MG: 300 CAPSULE ORAL at 14:18

## 2024-04-25 RX ADMIN — HYDROCODONE BITARTRATE AND ACETAMINOPHEN 1 TABLET: 7.5; 325 TABLET ORAL at 08:32

## 2024-04-25 RX ADMIN — OXYBUTYNIN CHLORIDE 10 MG: 10 TABLET, EXTENDED RELEASE ORAL at 21:29

## 2024-04-25 RX ADMIN — FLUOXETINE HYDROCHLORIDE 20 MG: 20 CAPSULE ORAL at 08:26

## 2024-04-25 RX ADMIN — FAMOTIDINE 20 MG: 20 TABLET, FILM COATED ORAL at 21:29

## 2024-04-25 ASSESSMENT — PAIN SCALES - GENERAL
PAINLEVEL_OUTOF10: 5
PAINLEVEL_OUTOF10: 6
PAINLEVEL_OUTOF10: 0
PAINLEVEL_OUTOF10: 6
PAINLEVEL_OUTOF10: 10
PAINLEVEL_OUTOF10: 3
PAINLEVEL_OUTOF10: 6

## 2024-04-25 ASSESSMENT — PAIN DESCRIPTION - LOCATION
LOCATION: NECK

## 2024-04-25 ASSESSMENT — PAIN DESCRIPTION - ONSET: ONSET: GRADUAL

## 2024-04-25 ASSESSMENT — PAIN DESCRIPTION - ORIENTATION
ORIENTATION: MID

## 2024-04-25 ASSESSMENT — PAIN DESCRIPTION - DESCRIPTORS
DESCRIPTORS: ACHING

## 2024-04-25 ASSESSMENT — PAIN DESCRIPTION - FREQUENCY: FREQUENCY: INTERMITTENT

## 2024-04-25 ASSESSMENT — PAIN DESCRIPTION - DIRECTION: RADIATING_TOWARDS: NECK

## 2024-04-25 ASSESSMENT — PAIN - FUNCTIONAL ASSESSMENT: PAIN_FUNCTIONAL_ASSESSMENT: PREVENTS OR INTERFERES SOME ACTIVE ACTIVITIES AND ADLS

## 2024-04-25 ASSESSMENT — PAIN DESCRIPTION - PAIN TYPE: TYPE: CHRONIC PAIN

## 2024-04-26 PROCEDURE — 1100000000 HC RM PRIVATE

## 2024-04-26 PROCEDURE — 6370000000 HC RX 637 (ALT 250 FOR IP)

## 2024-04-26 PROCEDURE — 97535 SELF CARE MNGMENT TRAINING: CPT

## 2024-04-26 PROCEDURE — 6370000000 HC RX 637 (ALT 250 FOR IP): Performed by: STUDENT IN AN ORGANIZED HEALTH CARE EDUCATION/TRAINING PROGRAM

## 2024-04-26 PROCEDURE — 6370000000 HC RX 637 (ALT 250 FOR IP): Performed by: ORTHOPAEDIC SURGERY

## 2024-04-26 PROCEDURE — 97112 NEUROMUSCULAR REEDUCATION: CPT

## 2024-04-26 PROCEDURE — 97530 THERAPEUTIC ACTIVITIES: CPT

## 2024-04-26 RX ORDER — DIPHENHYDRAMINE HCL 25 MG
25 CAPSULE ORAL EVERY 6 HOURS PRN
Status: DISCONTINUED | OUTPATIENT
Start: 2024-04-26 | End: 2024-04-30 | Stop reason: HOSPADM

## 2024-04-26 RX ADMIN — GABAPENTIN 300 MG: 300 CAPSULE ORAL at 08:37

## 2024-04-26 RX ADMIN — HYDROCODONE BITARTRATE AND ACETAMINOPHEN 1 TABLET: 7.5; 325 TABLET ORAL at 18:05

## 2024-04-26 RX ADMIN — OXYBUTYNIN CHLORIDE 10 MG: 10 TABLET, EXTENDED RELEASE ORAL at 20:26

## 2024-04-26 RX ADMIN — FLUOXETINE HYDROCHLORIDE 20 MG: 20 CAPSULE ORAL at 08:36

## 2024-04-26 RX ADMIN — FAMOTIDINE 20 MG: 20 TABLET, FILM COATED ORAL at 08:36

## 2024-04-26 RX ADMIN — POLYETHYLENE GLYCOL 3350 17 G: 17 POWDER, FOR SOLUTION ORAL at 08:37

## 2024-04-26 RX ADMIN — GABAPENTIN 300 MG: 300 CAPSULE ORAL at 20:26

## 2024-04-26 RX ADMIN — HYDROCODONE BITARTRATE AND ACETAMINOPHEN 1 TABLET: 7.5; 325 TABLET ORAL at 08:37

## 2024-04-26 RX ADMIN — HYDROCODONE BITARTRATE AND ACETAMINOPHEN 1 TABLET: 7.5; 325 TABLET ORAL at 22:02

## 2024-04-26 RX ADMIN — GABAPENTIN 300 MG: 300 CAPSULE ORAL at 13:39

## 2024-04-26 RX ADMIN — Medication 3 MG: at 20:27

## 2024-04-26 RX ADMIN — HYDROCODONE BITARTRATE AND ACETAMINOPHEN 1 TABLET: 7.5; 325 TABLET ORAL at 13:03

## 2024-04-26 RX ADMIN — FAMOTIDINE 20 MG: 20 TABLET, FILM COATED ORAL at 20:27

## 2024-04-26 RX ADMIN — DIPHENHYDRAMINE HYDROCHLORIDE 25 MG: 25 CAPSULE ORAL at 00:45

## 2024-04-26 RX ADMIN — DICLOFENAC SODIUM 4 G: 10 GEL TOPICAL at 18:06

## 2024-04-26 RX ADMIN — TRAZODONE HYDROCHLORIDE 150 MG: 50 TABLET ORAL at 20:26

## 2024-04-26 ASSESSMENT — PAIN DESCRIPTION - DESCRIPTORS
DESCRIPTORS: ACHING

## 2024-04-26 ASSESSMENT — PAIN DESCRIPTION - ORIENTATION
ORIENTATION: MID
ORIENTATION: LEFT;RIGHT
ORIENTATION: MID
ORIENTATION: MID
ORIENTATION: POSTERIOR
ORIENTATION: MID
ORIENTATION: POSTERIOR

## 2024-04-26 ASSESSMENT — PAIN DESCRIPTION - LOCATION
LOCATION: NECK

## 2024-04-26 ASSESSMENT — PAIN SCALES - GENERAL
PAINLEVEL_OUTOF10: 0
PAINLEVEL_OUTOF10: 0
PAINLEVEL_OUTOF10: 6
PAINLEVEL_OUTOF10: 3
PAINLEVEL_OUTOF10: 9
PAINLEVEL_OUTOF10: 6
PAINLEVEL_OUTOF10: 3
PAINLEVEL_OUTOF10: 6

## 2024-04-26 ASSESSMENT — PAIN DESCRIPTION - FREQUENCY: FREQUENCY: INTERMITTENT

## 2024-04-26 ASSESSMENT — PAIN DESCRIPTION - PAIN TYPE: TYPE: CHRONIC PAIN

## 2024-04-26 ASSESSMENT — PAIN SCALES - WONG BAKER: WONGBAKER_NUMERICALRESPONSE: NO HURT

## 2024-04-26 ASSESSMENT — PAIN DESCRIPTION - ONSET: ONSET: GRADUAL

## 2024-04-26 NOTE — CARE COORDINATION
Chart review complete for continued SAMEER planning.  Insurance approval remains pending at this time.  Once insurance approval received, [facility] will provide final LOC approval.  CM following.

## 2024-04-27 PROCEDURE — 6370000000 HC RX 637 (ALT 250 FOR IP): Performed by: ORTHOPAEDIC SURGERY

## 2024-04-27 PROCEDURE — 1100000000 HC RM PRIVATE

## 2024-04-27 PROCEDURE — 97530 THERAPEUTIC ACTIVITIES: CPT

## 2024-04-27 PROCEDURE — 97535 SELF CARE MNGMENT TRAINING: CPT

## 2024-04-27 RX ADMIN — FLUOXETINE HYDROCHLORIDE 20 MG: 20 CAPSULE ORAL at 08:13

## 2024-04-27 RX ADMIN — HYDROCODONE BITARTRATE AND ACETAMINOPHEN 1 TABLET: 7.5; 325 TABLET ORAL at 08:13

## 2024-04-27 RX ADMIN — DICLOFENAC SODIUM 4 G: 10 GEL TOPICAL at 21:08

## 2024-04-27 RX ADMIN — Medication 3 MG: at 21:04

## 2024-04-27 RX ADMIN — FAMOTIDINE 20 MG: 20 TABLET, FILM COATED ORAL at 21:04

## 2024-04-27 RX ADMIN — GABAPENTIN 300 MG: 300 CAPSULE ORAL at 13:19

## 2024-04-27 RX ADMIN — FAMOTIDINE 20 MG: 20 TABLET, FILM COATED ORAL at 08:13

## 2024-04-27 RX ADMIN — TRAZODONE HYDROCHLORIDE 150 MG: 50 TABLET ORAL at 21:04

## 2024-04-27 RX ADMIN — GABAPENTIN 300 MG: 300 CAPSULE ORAL at 08:13

## 2024-04-27 RX ADMIN — HYDROCODONE BITARTRATE AND ACETAMINOPHEN 1 TABLET: 7.5; 325 TABLET ORAL at 13:13

## 2024-04-27 RX ADMIN — HYDROCODONE BITARTRATE AND ACETAMINOPHEN 1 TABLET: 7.5; 325 TABLET ORAL at 21:04

## 2024-04-27 RX ADMIN — METHOCARBAMOL TABLETS 750 MG: 750 TABLET, COATED ORAL at 22:24

## 2024-04-27 RX ADMIN — GABAPENTIN 300 MG: 300 CAPSULE ORAL at 21:04

## 2024-04-27 RX ADMIN — OXYBUTYNIN CHLORIDE 10 MG: 10 TABLET, EXTENDED RELEASE ORAL at 21:04

## 2024-04-27 ASSESSMENT — PAIN DESCRIPTION - DESCRIPTORS
DESCRIPTORS: ACHING
DESCRIPTORS: ACHING
DESCRIPTORS: STABBING
DESCRIPTORS: CRAMPING;SPASM

## 2024-04-27 ASSESSMENT — PAIN DESCRIPTION - LOCATION
LOCATION: NECK
LOCATION: LEG
LOCATION: NECK;BACK
LOCATION: NECK

## 2024-04-27 ASSESSMENT — PAIN SCALES - GENERAL
PAINLEVEL_OUTOF10: 8
PAINLEVEL_OUTOF10: 6
PAINLEVEL_OUTOF10: 0
PAINLEVEL_OUTOF10: 6
PAINLEVEL_OUTOF10: 5

## 2024-04-27 ASSESSMENT — PAIN DESCRIPTION - ORIENTATION
ORIENTATION: RIGHT;LEFT
ORIENTATION: MID

## 2024-04-28 PROCEDURE — 6370000000 HC RX 637 (ALT 250 FOR IP): Performed by: ORTHOPAEDIC SURGERY

## 2024-04-28 PROCEDURE — 97116 GAIT TRAINING THERAPY: CPT

## 2024-04-28 PROCEDURE — 97530 THERAPEUTIC ACTIVITIES: CPT

## 2024-04-28 PROCEDURE — 1100000000 HC RM PRIVATE

## 2024-04-28 RX ADMIN — FLUOXETINE HYDROCHLORIDE 20 MG: 20 CAPSULE ORAL at 08:15

## 2024-04-28 RX ADMIN — GABAPENTIN 300 MG: 300 CAPSULE ORAL at 13:40

## 2024-04-28 RX ADMIN — HYDROCODONE BITARTRATE AND ACETAMINOPHEN 1 TABLET: 7.5; 325 TABLET ORAL at 13:41

## 2024-04-28 RX ADMIN — Medication 3 MG: at 20:16

## 2024-04-28 RX ADMIN — HYDROCODONE BITARTRATE AND ACETAMINOPHEN 1 TABLET: 7.5; 325 TABLET ORAL at 08:19

## 2024-04-28 RX ADMIN — TRAZODONE HYDROCHLORIDE 150 MG: 50 TABLET ORAL at 20:17

## 2024-04-28 RX ADMIN — GABAPENTIN 300 MG: 300 CAPSULE ORAL at 08:15

## 2024-04-28 RX ADMIN — FAMOTIDINE 20 MG: 20 TABLET, FILM COATED ORAL at 08:15

## 2024-04-28 RX ADMIN — HYDROCODONE BITARTRATE AND ACETAMINOPHEN 1 TABLET: 7.5; 325 TABLET ORAL at 20:17

## 2024-04-28 RX ADMIN — GABAPENTIN 300 MG: 300 CAPSULE ORAL at 20:16

## 2024-04-28 RX ADMIN — FAMOTIDINE 20 MG: 20 TABLET, FILM COATED ORAL at 20:17

## 2024-04-28 RX ADMIN — OXYBUTYNIN CHLORIDE 10 MG: 10 TABLET, EXTENDED RELEASE ORAL at 20:16

## 2024-04-28 ASSESSMENT — PAIN DESCRIPTION - LOCATION
LOCATION: BACK;NECK
LOCATION: SHOULDER
LOCATION: BACK;NECK
LOCATION: NECK
LOCATION: NECK;BACK

## 2024-04-28 ASSESSMENT — PAIN DESCRIPTION - ORIENTATION
ORIENTATION: LEFT;RIGHT
ORIENTATION: MID;POSTERIOR
ORIENTATION: MID

## 2024-04-28 ASSESSMENT — PAIN SCALES - GENERAL
PAINLEVEL_OUTOF10: 0
PAINLEVEL_OUTOF10: 5
PAINLEVEL_OUTOF10: 6
PAINLEVEL_OUTOF10: 5
PAINLEVEL_OUTOF10: 3
PAINLEVEL_OUTOF10: 2
PAINLEVEL_OUTOF10: 1

## 2024-04-28 ASSESSMENT — PAIN DESCRIPTION - DESCRIPTORS
DESCRIPTORS: ACHING
DESCRIPTORS: ACHING

## 2024-04-28 ASSESSMENT — PAIN SCALES - WONG BAKER: WONGBAKER_NUMERICALRESPONSE: NO HURT

## 2024-04-29 LAB
SARS-COV-2 RDRP RESP QL NAA+PROBE: NOT DETECTED
SOURCE: NORMAL

## 2024-04-29 PROCEDURE — 6370000000 HC RX 637 (ALT 250 FOR IP): Performed by: ORTHOPAEDIC SURGERY

## 2024-04-29 PROCEDURE — 6370000000 HC RX 637 (ALT 250 FOR IP): Performed by: HOSPITALIST

## 2024-04-29 PROCEDURE — 97116 GAIT TRAINING THERAPY: CPT

## 2024-04-29 PROCEDURE — 97530 THERAPEUTIC ACTIVITIES: CPT

## 2024-04-29 PROCEDURE — 1100000000 HC RM PRIVATE

## 2024-04-29 PROCEDURE — 87635 SARS-COV-2 COVID-19 AMP PRB: CPT

## 2024-04-29 RX ORDER — LORAZEPAM 0.5 MG/1
0.5 TABLET ORAL ONCE
Status: COMPLETED | OUTPATIENT
Start: 2024-04-29 | End: 2024-04-29

## 2024-04-29 RX ADMIN — GABAPENTIN 300 MG: 300 CAPSULE ORAL at 09:34

## 2024-04-29 RX ADMIN — ACETAMINOPHEN 650 MG: 325 TABLET ORAL at 05:33

## 2024-04-29 RX ADMIN — FAMOTIDINE 20 MG: 20 TABLET, FILM COATED ORAL at 20:37

## 2024-04-29 RX ADMIN — HYDROCODONE BITARTRATE AND ACETAMINOPHEN 1 TABLET: 7.5; 325 TABLET ORAL at 10:43

## 2024-04-29 RX ADMIN — TRAZODONE HYDROCHLORIDE 150 MG: 50 TABLET ORAL at 20:37

## 2024-04-29 RX ADMIN — METHOCARBAMOL TABLETS 750 MG: 750 TABLET, COATED ORAL at 20:48

## 2024-04-29 RX ADMIN — METHOCARBAMOL TABLETS 750 MG: 750 TABLET, COATED ORAL at 00:06

## 2024-04-29 RX ADMIN — GABAPENTIN 300 MG: 300 CAPSULE ORAL at 20:37

## 2024-04-29 RX ADMIN — OXYBUTYNIN CHLORIDE 10 MG: 10 TABLET, EXTENDED RELEASE ORAL at 20:37

## 2024-04-29 RX ADMIN — FLUOXETINE HYDROCHLORIDE 20 MG: 20 CAPSULE ORAL at 09:34

## 2024-04-29 RX ADMIN — FAMOTIDINE 20 MG: 20 TABLET, FILM COATED ORAL at 09:34

## 2024-04-29 RX ADMIN — DICLOFENAC SODIUM 4 G: 10 GEL TOPICAL at 02:16

## 2024-04-29 RX ADMIN — Medication 3 MG: at 20:37

## 2024-04-29 RX ADMIN — GABAPENTIN 300 MG: 300 CAPSULE ORAL at 14:17

## 2024-04-29 RX ADMIN — HYDROCODONE BITARTRATE AND ACETAMINOPHEN 1 TABLET: 7.5; 325 TABLET ORAL at 18:22

## 2024-04-29 RX ADMIN — HYDROCODONE BITARTRATE AND ACETAMINOPHEN 1 TABLET: 7.5; 325 TABLET ORAL at 04:08

## 2024-04-29 RX ADMIN — LORAZEPAM 0.5 MG: 0.5 TABLET ORAL at 09:40

## 2024-04-29 ASSESSMENT — PAIN SCALES - GENERAL
PAINLEVEL_OUTOF10: 3
PAINLEVEL_OUTOF10: 5
PAINLEVEL_OUTOF10: 6
PAINLEVEL_OUTOF10: 7

## 2024-04-29 ASSESSMENT — PAIN DESCRIPTION - LOCATION
LOCATION: GENERALIZED
LOCATION: BACK
LOCATION: GENERALIZED
LOCATION: GENERALIZED

## 2024-04-29 ASSESSMENT — PAIN DESCRIPTION - DESCRIPTORS
DESCRIPTORS: ACHING
DESCRIPTORS: ACHING

## 2024-04-29 NOTE — CARE COORDINATION
Insurance approval still pending for STR admission to [facility].  The SNF liaison contacted insurance this morning and submitted updated clinicals.  CM following.

## 2024-04-30 VITALS
WEIGHT: 193.56 LBS | OXYGEN SATURATION: 94 % | RESPIRATION RATE: 18 BRPM | SYSTOLIC BLOOD PRESSURE: 117 MMHG | TEMPERATURE: 97.9 F | BODY MASS INDEX: 30.38 KG/M2 | HEIGHT: 67 IN | HEART RATE: 84 BPM | DIASTOLIC BLOOD PRESSURE: 77 MMHG

## 2024-04-30 PROCEDURE — 6370000000 HC RX 637 (ALT 250 FOR IP): Performed by: ORTHOPAEDIC SURGERY

## 2024-04-30 PROCEDURE — 97530 THERAPEUTIC ACTIVITIES: CPT

## 2024-04-30 PROCEDURE — 6370000000 HC RX 637 (ALT 250 FOR IP): Performed by: HOSPITALIST

## 2024-04-30 RX ORDER — HYDROCODONE BITARTRATE AND ACETAMINOPHEN 7.5; 325 MG/1; MG/1
1 TABLET ORAL EVERY 6 HOURS PRN
Qty: 20 TABLET | Refills: 0 | Status: SHIPPED | OUTPATIENT
Start: 2024-04-30 | End: 2024-05-05

## 2024-04-30 RX ORDER — MENTHOL 1.4 %
ADHESIVE PATCH, MEDICATED TOPICAL 3 TIMES DAILY PRN
Qty: 57 G | Refills: 0
Start: 2024-04-30

## 2024-04-30 RX ORDER — LORAZEPAM 0.5 MG/1
0.5 TABLET ORAL 2 TIMES DAILY PRN
Qty: 20 TABLET | Refills: 0 | Status: SHIPPED | OUTPATIENT
Start: 2024-04-30 | End: 2024-05-10

## 2024-04-30 RX ORDER — LORAZEPAM 0.5 MG/1
0.5 TABLET ORAL 2 TIMES DAILY PRN
Status: DISCONTINUED | OUTPATIENT
Start: 2024-04-30 | End: 2024-04-30 | Stop reason: HOSPADM

## 2024-04-30 RX ORDER — SENNA AND DOCUSATE SODIUM 50; 8.6 MG/1; MG/1
1 TABLET, FILM COATED ORAL DAILY PRN
Qty: 30 TABLET | Refills: 0
Start: 2024-04-30

## 2024-04-30 RX ORDER — GABAPENTIN 300 MG/1
300 CAPSULE ORAL 3 TIMES DAILY
Qty: 90 CAPSULE | Refills: 3 | Status: SHIPPED | OUTPATIENT
Start: 2024-04-30 | End: 2024-05-30

## 2024-04-30 RX ADMIN — HYDROCODONE BITARTRATE AND ACETAMINOPHEN 1 TABLET: 7.5; 325 TABLET ORAL at 08:59

## 2024-04-30 RX ADMIN — GABAPENTIN 300 MG: 300 CAPSULE ORAL at 14:55

## 2024-04-30 RX ADMIN — LORAZEPAM 0.5 MG: 0.5 TABLET ORAL at 08:59

## 2024-04-30 RX ADMIN — FAMOTIDINE 20 MG: 20 TABLET, FILM COATED ORAL at 08:59

## 2024-04-30 RX ADMIN — FLUOXETINE HYDROCHLORIDE 20 MG: 20 CAPSULE ORAL at 08:58

## 2024-04-30 RX ADMIN — GABAPENTIN 300 MG: 300 CAPSULE ORAL at 08:58

## 2024-04-30 RX ADMIN — HYDROCODONE BITARTRATE AND ACETAMINOPHEN 1 TABLET: 7.5; 325 TABLET ORAL at 04:07

## 2024-04-30 ASSESSMENT — PAIN DESCRIPTION - DESCRIPTORS
DESCRIPTORS: ACHING

## 2024-04-30 ASSESSMENT — PAIN DESCRIPTION - ONSET: ONSET: GRADUAL

## 2024-04-30 ASSESSMENT — PAIN DESCRIPTION - LOCATION
LOCATION: GENERALIZED

## 2024-04-30 ASSESSMENT — PAIN DESCRIPTION - FREQUENCY
FREQUENCY: INTERMITTENT
FREQUENCY: INTERMITTENT

## 2024-04-30 ASSESSMENT — PAIN - FUNCTIONAL ASSESSMENT: PAIN_FUNCTIONAL_ASSESSMENT: ACTIVITIES ARE NOT PREVENTED

## 2024-04-30 ASSESSMENT — PAIN DESCRIPTION - PAIN TYPE: TYPE: CHRONIC PAIN

## 2024-04-30 ASSESSMENT — PAIN SCALES - GENERAL
PAINLEVEL_OUTOF10: 5
PAINLEVEL_OUTOF10: 5

## 2024-04-30 ASSESSMENT — PAIN DESCRIPTION - ORIENTATION: ORIENTATION: LEFT;RIGHT

## 2024-04-30 NOTE — PROGRESS NOTES
Hospitalist Progress Note   Admit Date:  2024 10:15 AM   Name:  Alva Kramer   Age:  54 y.o.  Sex:  female  :  1970   MRN:  [id number]   Room:  [facility]/    Presenting/Chief Complaint: Incontinence and Fatigue     Reason(s) for Admission: Neck pain [M54.2]  Cervical myelopathy (HCC) [G95.9]  Spinal stenosis of cervical region [M48.02]  Weakness of both lower extremities [R29.898]  Incontinence of feces, unspecified fecal incontinence type [R15.9]     Hospital Course:   Alva Kramer is a 54 y.o. female with medical history of medical history of breast cancer, cervical myelopathy (C3 C6), transverse myelitis, multiple sclerosis on Ocrevus, NPH status post  shunt who presented with chief complaints of worsening pain in her neck, bilateral upper and lower extremity weakness and continue bowel incontinence. Was seen by neurosurgery at [facility] on 3/26/23 with plans for C5-c6 ACDF. Was just discharged from [facility] a few days ago.      Vitals and labs stable in ER.  Cervial MRI showed spinal cord signal abnormality in C3 to C6 and ortho was consulted.  S/p c3-c6 laminectomy and c3-c7 instrumented fusion on .     Subjective & 24hr Events:   Patient seen at bedside, resting in bedside recliner.  Patient is anxious and tearful saying that she feels as if her body is \"locking down\".  Patient describes feeling a sensation of electrical shock in bilateral upper extremity and lower extremities.  Case discussed with Dr. Reyna.  Patient counseled about her symptoms being normal phenomenon with recent surgery and myelopathy.  Was given Prozac and 1 dose of oral Ativan 0.5 mg.      ROS:  10 point review of system is negative except for what mentioned above.      Assessment & Plan:     Principal Problem:  Cervical myelopathy (HCC)  Bowel incontinence  MRI spine findings noted   C3-C6 laminectomy and a C3-C6 instrumented fusion   POD 10  Drain taken out on .  PT OT eval ordered  Will need placement   PPD 
       Hospitalist Progress Note   Admit Date:  2024 10:15 AM   Name:  Alva Kramer   Age:  54 y.o.  Sex:  female  :  1970   MRN:  [id number]   Room:  [facility]/    Presenting/Chief Complaint: Incontinence and Fatigue     Reason(s) for Admission: Neck pain [M54.2]  Cervical myelopathy (HCC) [G95.9]  Spinal stenosis of cervical region [M48.02]  Weakness of both lower extremities [R29.898]  Incontinence of feces, unspecified fecal incontinence type [R15.9]     Hospital Course:   Alva Kramer is a 54 y.o. female with medical history of medical history of breast cancer, cervical myelopathy (C3 C6), transverse myelitis, multiple sclerosis on Ocrevus, NPH status post  shunt who presented with chief complaints of worsening pain in her neck, bilateral upper and lower extremity weakness and continue bowel incontinence. Was seen by neurosurgery at [facility] on 3/26/23 with plans for C5-c6 ACDF. Was just discharged from [facility] a few days ago.      Vitals and labs stable in ER.  Cervial MRI showed spinal cord signal abnormality in C3 to C6 and ortho was consulted.  S/p c3-c6 laminectomy and c3-c7 instrumented fusion on .     Subjective & 24hr Events:   Patient seen at bedside, resting in bed comfortably.  She is alert and awake, AOx3, on room air.  Patient is currently with soft collar.  Denies any chest pain, nausea, vomiting, fever chills or night sweats.       ROS:  10 point review of system is negative except for what mentioned above.      Assessment & Plan:     Principal Problem:  Cervical myelopathy (HCC)  Bowel incontinence  MRI spine findings noted  Ortho spine consulted   C3-C6 laminectomy and a C3-C6 instrumented fusion   POD 7  PT OT eval ordered  Will need placement   PPD ordered  Monitor I's and O's   Bowel regimen  Topical agents added for arm numbness  SNF pending       Transverse myelitis (HCC)  Continue Neurontin 300 mg p.o. 3 times daily  Robaxin prn        Multiple sclerosis (HCC)  Patient not in 
       Hospitalist Progress Note   Admit Date:  2024 10:15 AM   Name:  Alva Kramer   Age:  54 y.o.  Sex:  female  :  1970   MRN:  [id number]   Room:  [facility]/    Presenting/Chief Complaint: Incontinence and Fatigue     Reason(s) for Admission: Neck pain [M54.2]  Cervical myelopathy (HCC) [G95.9]  Spinal stenosis of cervical region [M48.02]  Weakness of both lower extremities [R29.898]  Incontinence of feces, unspecified fecal incontinence type [R15.9]     Hospital Course:   Alva Kramer is a 54 y.o. female with medical history of medical history of breast cancer, cervical myelopathy (C3 C6), transverse myelitis, multiple sclerosis on Ocrevus, NPH status post  shunt who presented with chief complaints of worsening pain in her neck, bilateral upper and lower extremity weakness and continue bowel incontinence. Was seen by neurosurgery at [facility] on 3/26/23 with plans for C5-c6 ACDF. Was just discharged from [facility] a few days ago.      Vitals and labs stable in ER.  Cervial MRI showed spinal cord signal abnormality in C3 to C6 and ortho was consulted.  S/p c3-c6 laminectomy and c3-c7 instrumented fusion on .     Subjective & 24hr Events:   Sitting up in chair. States her arms hurt this morning. Hasn't had a BM in a few days. Drain out yesterday.       Assessment & Plan:     Principal Problem:  Cervical myelopathy (HCC)  Bowel incontinence  MRI spine findings noted  Ortho spine consulted   C3-C6 laminectomy and a C3-C6 instrumented fusion   POD 5  PT OT eval ordered  Will need placement   PPD ordered  Monitor I's and O's   Bowel regimen  Topical agents added for arm numbness  SNF pending       Transverse myelitis (HCC)  Continue Neurontin 300 mg p.o. 3 times daily  Robaxin prn        Multiple sclerosis (HCC)  Patient not in exacerbation.  Continue Ocrevus infusion as scheduled by patient's neurologist outpt       NPH (normal pressure hydrocephalus) (HCC)    Status post ventriculo-peritoneal shunt 
       Hospitalist Progress Note   Admit Date:  2024 10:15 AM   Name:  Alva Kramer   Age:  54 y.o.  Sex:  female  :  1970   MRN:  [id number]   Room:  [facility]/    Presenting/Chief Complaint: Incontinence and Fatigue     Reason(s) for Admission: Neck pain [M54.2]  Cervical myelopathy (HCC) [G95.9]  Spinal stenosis of cervical region [M48.02]  Weakness of both lower extremities [R29.898]  Incontinence of feces, unspecified fecal incontinence type [R15.9]     Hospital Course:   Alva Kramer is a 54 y.o. female with medical history of medical history of breast cancer, cervical myelopathy (C3 C6), transverse myelitis, multiple sclerosis on Ocrevus, NPH status post  shunt who presented with chief complaints of worsening pain in her neck, bilateral upper and lower extremity weakness and continue bowel incontinence. Was seen by neurosurgery at [facility] on 3/26/23 with plans for C5-c6 ACDF. Was just discharged from [facility] a few days ago.      Vitals and labs stable in ER.  Cervial MRI showed spinal cord signal abnormality in C3 to C6 and ortho was consulted.  S/p c3-c6 laminectomy and c3-c7 instrumented fusion pn .     Subjective & 24hr Events:   Still no BM. Her son's wedding is today and she is sad to not be able to attend.       Assessment & Plan:     Principal Problem:  Cervical myelopathy (HCC)  Bowel incontinence  MRI spine findings noted  Ortho spine consulted   C3-C6 laminectomy and a C3-C6 instrumented fusion   POD 3  PT OT eval ordered  Will need placement   PPD ordered  Monitor I's and O's   Bowel regimen  Topical agents added for arm numbness  Still with drain in place      Transverse myelitis (HCC)  Continue Neurontin 300 mg p.o. 3 times daily  Robaxin prn        Multiple sclerosis (HCC)  Patient not in exacerbation.  Continue Ocrevus infusion as scheduled by patient's neurologist outpt       NPH (normal pressure hydrocephalus) (HCC)    Status post ventriculo-peritoneal shunt placement   Patient is 
       Hospitalist Progress Note   Admit Date:  2024 10:15 AM   Name:  Alva Kramer   Age:  54 y.o.  Sex:  female  :  1970   MRN:  [id number]   Room:  [facility]/    Presenting/Chief Complaint: Incontinence and Fatigue     Reason(s) for Admission: Neck pain [M54.2]  Cervical myelopathy (HCC) [G95.9]  Spinal stenosis of cervical region [M48.02]  Weakness of both lower extremities [R29.898]  Incontinence of feces, unspecified fecal incontinence type [R15.9]     Hospital Course:   Alva Kramer is a 54 y.o. female with medical history of medical history of breast cancer, cervical myelopathy (C3 C6), transverse myelitis, multiple sclerosis on Ocrevus, NPH status post  shunt who presented with chief complaints of worsening pain in her neck, bilateral upper and lower extremity weakness and continue bowel incontinence. Was seen by neurosurgery at [facility] on 3/26/23 with plans for C5-c6 ACDF. Was just discharged from [facility] a few days ago.      Vitals and labs stable in ER.  Cervial MRI showed spinal cord signal abnormality in C3 to C6 and ortho was consulted.  S/p c3-c6 laminectomy and c3-c7 instrumented fusion. On .     Subjective & 24hr Events:   In bed this morning. States her arms feel tingly. Is urinating but no BM. Moving legs spontaneously.       Assessment & Plan:     Principal Problem:  Cervical myelopathy (HCC)  Bowel incontinence  MRI spine findings noted  Ortho spine consulted   C3-C6 laminectomy and a C3-C6 instrumented fusion   POD 1   PT OT eval ordered  Will need placement   PPD ordered  Monitor I's and O's       Transverse myelitis (HCC)  Continue Neurontin 300 mg p.o. 3 times daily  Will order for muscle relaxer as needed.       Multiple sclerosis (HCC)  Patient not in exacerbation.  Continue Ocrevus infusion as scheduled by patient's neurologist outpt       NPH (normal pressure hydrocephalus) (HCC)    Status post ventriculo-peritoneal shunt placement   Patient is status post  shunt placement 
       Hospitalist Progress Note   Admit Date:  2024 10:15 AM   Name:  Alva Kramer   Age:  54 y.o.  Sex:  female  :  1970   MRN:  [id number]   Room:  [facility]/    Presenting/Chief Complaint: Incontinence and Fatigue     Reason(s) for Admission: Neck pain [M54.2]  Cervical myelopathy (HCC) [G95.9]  Spinal stenosis of cervical region [M48.02]  Weakness of both lower extremities [R29.898]  Incontinence of feces, unspecified fecal incontinence type [R15.9]     Hospital Course:   Alva Kramer is a 54 y.o. female with medical history of medical history of breast cancer, cervical myelopathy (C3 C6), transverse myelitis, multiple sclerosis on Ocrevus, NPH status post  shunt who presented with chief complaints of worsening pain in her neck, bilateral upper and lower extremity weakness and continue bowel incontinence. Was seen by neurosurgery at [facility] on 3/26/23 with plans for C5-c6 ACDF. Was just discharged from [facility] a few days ago.      Vitals and labs stable in ER.  Cervial MRI showed spinal cord signal abnormality in C3 to C6 and ortho was consulted.  S/p c3-c6 laminectomy and c3-c7 instrumented fusion on .     Subjective & 24hr Events:   Patient seen and examined at bedside recliner.  She is alert and awake, AOx3, on room air.  Denies any chest pain, nausea, vomiting, fever chills or night sweats.  Patient needs to be with soft cervical collar.      ROS:  10 point review of system is negative except for what mentioned above.      Assessment & Plan:     Principal Problem:  Cervical myelopathy (HCC)  Bowel incontinence  MRI spine findings noted  Ortho spine consulted   C3-C6 laminectomy and a C3-C6 instrumented fusion   POD 8  PT OT eval ordered  Will need placement   PPD ordered  Monitor I's and O's   Bowel regimen  Topical agents added for arm numbness  SNF pending       Transverse myelitis (HCC)  Continue Neurontin 300 mg p.o. 3 times daily  Robaxin prn        Multiple sclerosis (HCC)  Patient not in 
       Hospitalist Progress Note   Admit Date:  2024 10:15 AM   Name:  Alva Kramer   Age:  54 y.o.  Sex:  female  :  1970   MRN:  [id number]   Room:  [facility]/    Presenting/Chief Complaint: Incontinence and Fatigue     Reason(s) for Admission: Neck pain [M54.2]  Cervical myelopathy (HCC) [G95.9]  Spinal stenosis of cervical region [M48.02]  Weakness of both lower extremities [R29.898]  Incontinence of feces, unspecified fecal incontinence type [R15.9]     Hospital Course:   Alva Kramer is a 54 y.o. female with medical history of medical history of breast cancer, cervical myelopathy (C3 C6), transverse myelitis, multiple sclerosis on Ocrevus, NPH status post  shunt who presented with chief complaints of worsening pain in her neck, bilateral upper and lower extremity weakness and continue bowel incontinence. Was seen by neurosurgery at [facility] on 3/26/23 with plans for C5-c6 ACDF. Was just discharged from [facility] a few days ago.      Vitals and labs stable in ER.  Cervial MRI showed spinal cord signal abnormality in C3 to C6 and ortho was consulted.  S/p c3-c6 laminectomy and c3-c7 instrumented fusion on .     Subjective & 24hr Events:   Patient seen at bedside, resting in bed comfortably.  She is alert and awake, AOx3, on room air.  Patient reports feeling well, denies any acute neck pain, worsening upper and lower extremity weakness.  Denies any chest pain, nausea, vomiting, fever chills or night sweats.  Postop course so far has been uneventful.      ROS:  10 point review of system is negative except for what mentioned above.      Assessment & Plan:     Principal Problem:  Cervical myelopathy (HCC)  Bowel incontinence  MRI spine findings noted  Ortho spine consulted   C3-C6 laminectomy and a C3-C6 instrumented fusion   POD 6  PT OT eval ordered  Will need placement   PPD ordered  Monitor I's and O's   Bowel regimen  Topical agents added for arm numbness  SNF pending       Transverse myelitis 
       Hospitalist Progress Note   Admit Date:  2024 10:15 AM   Name:  Alva Kramer   Age:  54 y.o.  Sex:  female  :  1970   MRN:  [id number]   Room:  [facility]/    Presenting/Chief Complaint: Incontinence and Fatigue     Reason(s) for Admission: Neck pain [M54.2]  Cervical myelopathy (HCC) [G95.9]  Spinal stenosis of cervical region [M48.02]  Weakness of both lower extremities [R29.898]  Incontinence of feces, unspecified fecal incontinence type [R15.9]     Hospital Course:   Alva Kramer is a 54 y.o. female with medical history of medical history of breast cancer, cervical myelopathy (C3 C6), transverse myelitis, multiple sclerosis on Ocrevus, NPH status post  shunt who presented with chief complaints of worsening pain in her neck, bilateral upper and lower extremity weakness and continue bowel incontinence. Was seen by neurosurgery at [facility] on 3/26/23 with plans for C5-c6 ACDF. Was just discharged from [facility] a few days ago.      Vitals and labs stable in ER.  Cervial MRI showed spinal cord signal abnormality in C3 to C6 and ortho was consulted.  S/p c3-c6 laminectomy and c3-c7 instrumented fusion on .     Subjective & 24hr Events:   Sitting up in chair again.  Was on the phone with sister. Provided sister with update.       Assessment & Plan:     Principal Problem:  Cervical myelopathy (HCC)  Bowel incontinence  MRI spine findings noted  Ortho spine consulted   C3-C6 laminectomy and a C3-C6 instrumented fusion   POD 5  PT OT eval ordered  Will need placement   PPD ordered  Monitor I's and O's   Bowel regimen  Topical agents added for arm numbness  SNF pending       Transverse myelitis (HCC)  Continue Neurontin 300 mg p.o. 3 times daily  Robaxin prn        Multiple sclerosis (HCC)  Patient not in exacerbation.  Continue Ocrevus infusion as scheduled by patient's neurologist outpt       NPH (normal pressure hydrocephalus) (HCC)    Status post ventriculo-peritoneal shunt placement   Patient is status 
2024         Post Op day: 2 Days Post-Op   Admit Diagnosis: Neck pain [M54.2]  Cervical myelopathy (HCC) [G95.9]  Spinal stenosis of cervical region [M48.02]  Weakness of both lower extremities [R29.898]  Incontinence of feces, unspecified fecal incontinence type [R15.9]  LAB:    Recent Results (from the past 24 hour(s))   PLEASE READ & DOCUMENT PPD TEST IN 24 HRS    Collection Time: 24  8:56 PM   Result Value Ref Range    PPD, (POC) Negative     mm Induration 0 0 - 5 mm   Basic Metabolic Panel w/ Reflex to MG    Collection Time: 24  5:30 AM   Result Value Ref Range    Sodium 140 136 - 146 mmol/L    Potassium 4.0 3.5 - 5.1 mmol/L    Chloride 109 103 - 113 mmol/L    CO2 27 21 - 32 mmol/L    Anion Gap 4 2 - 11 mmol/L    Glucose 116 (H) 65 - 100 mg/dL    BUN 18 6 - 23 MG/DL    Creatinine 0.60 0.6 - 1.0 MG/DL    Est, Glom Filt Rate >90 >60 ml/min/1.73m2    Calcium 9.2 8.3 - 10.4 MG/DL   CBC    Collection Time: 24  5:30 AM   Result Value Ref Range    WBC 13.0 (H) 4.3 - 11.1 K/uL    RBC 4.52 4.05 - 5.2 M/uL    Hemoglobin 12.4 11.7 - 15.4 g/dL    Hematocrit 38.9 35.8 - 46.3 %    MCV 86.1 82 - 102 FL    MCH 27.4 26.1 - 32.9 PG    MCHC 31.9 31.4 - 35.0 g/dL    RDW 14.3 11.9 - 14.6 %    Platelets 246 150 - 450 K/uL    MPV 9.5 9.4 - 12.3 FL    nRBC 0.00 0.0 - 0.2 K/uL     Vital Signs:    No data found.  Temp (24hrs), Av.9 °F (36.6 °C), Min:97.9 °F (36.6 °C), Max:97.9 °F (36.6 °C)    Pain Control:      Subjective: Doing well, sitting up at bedside with PT, pain is well controlled, still with some residual numbness in BUEs.  No other complaints.     Objective:  No Acute Distress, Alert and Oriented, soft collar in place. Drain in place. Neurovascular exam remains relatively unchanged from  with numbness present in BUEs.  Improved UE and LE strength.         Assessment:   Patient Active Problem List   Diagnosis    Cervical myelopathy (HCC)    Transverse myelitis (HCC)    Multiple sclerosis 
2024         Post Op day: 3 Days Post-Op     Admit Date: 2024  Admit Diagnosis: Neck pain [M54.2]  Cervical myelopathy (HCC) [G95.9]  Spinal stenosis of cervical region [M48.02]  Weakness of both lower extremities [R29.898]  Incontinence of feces, unspecified fecal incontinence type [R15.9]        Subjective: pain controlled. Still some residual BUE numbness. No SOB, No Chest Pain, No Nausea or Vomiting     Objective:   Vital Signs are Stable, No Acute Distress, Alert and Oriented, Dressing is Dry,  Neurovascular exam is normal.     Assessment / Plan :  Patient Active Problem List   Diagnosis    Cervical myelopathy (HCC)    Transverse myelitis (HCC)    Multiple sclerosis (HCC)    NPH (normal pressure hydrocephalus) (HCC)    Status post ventriculo-peritoneal shunt placement    Bowel incontinence    Patient Vitals for the past 8 hrs:   Resp   24 0900 18    Temp (24hrs), Av.2 °F (36.8 °C), Min:98.2 °F (36.8 °C), Max:98.2 °F (36.8 °C)    Body mass index is 30.32 kg/m².    Lab Results   Component Value Date/Time    HGB 13.2 2024 05:28 AM      Pt seen by and discussed with Supervising Physician   Continue PT/OT, medical management per hospitalist.  Continue mechanical DVT PPX.  D/c disp: STR      Signed By: MARA Jaime    
4 Eyes Skin Assessment     NAME:  Alva Kramer  YOB: 1970  MEDICAL RECORD NUMBER:  [id number]    The patient is being assessed for  Admission    I agree that at least one RN has performed a thorough Head to Toe Skin Assessment on the patient. ALL assessment sites listed below have been assessed.      Areas assessed by both nurses:    Head, Face, Ears, Shoulders, Back, Chest, Arms, Elbows, Hands, Sacrum. Buttock, Coccyx, Ischium, and Legs. Feet and Heels        Does the Patient have a Wound? Yes wound(s) were present on assessment. LDA wound assessment was Initiated and completed by RN     Left foot wound areas noted   Watson Prevention initiated by RN: Yes  Wound Care Orders initiated by RN: No    Pressure Injury (Stage 3,4, Unstageable, DTI, NWPT, and Complex wounds) if present, place Wound referral order by RN under : No    New Ostomies, if present place, Ostomy referral order under : No     Nurse 1 eSignature: Electronically signed by Latonia Goldman RN on 4/17/24 at 9:31 PM EDT    **SHARE this note so that the co-signing nurse can place an eSignature**    Nurse 2 eSignature: Electronically signed by Tessa Wellington RN on 4/17/24 at 11:08 PM EDT   
4 Eyes Skin Assessment     NAME:  Alva Kramer  YOB: 1970  MEDICAL RECORD NUMBER:  [id number]    The patient is being assessed for  {Reason for Assessment:46510}    I agree that at least one RN has performed a thorough Head to Toe Skin Assessment on the patient. ALL assessment sites listed below have been assessed.      Areas assessed by both nurses:    Head, Face, Ears, Shoulders, Back, Chest, Arms, Elbows, Hands, Sacrum. Buttock, Coccyx, Ischium, and Legs. Feet and Heels        Does the Patient have a Wound? No noted wound(s)       Watson Prevention initiated by RN: No  Wound Care Orders initiated by RN: No    Pressure Injury (Stage 3,4, Unstageable, DTI, NWPT, and Complex wounds) if present, place Wound referral order by RN under : No    New Ostomies, if present place, Ostomy referral order under : No     Nurse 1 eSignature: Electronically signed by Subha Hayes RN on 4/18/24 at 10:29 PM EDT    **SHARE this note so that the co-signing nurse can place an eSignature**    Nurse 2 eSignature: {Esignature:[id number]}    
ACUTE OCCUPATIONAL THERAPY GOALS:   (Developed with and agreed upon by patient and/or caregiver.)  1. Pt will toilet with CGA   2. Pt will complete functional mobility for ADLs with contact guard assistance CGA  3. Pt will complete lower body dressing with CGA using AE as needed  4. Pt will complete grooming at sink with CGA  5. Pt will demonstrate independence with HEP to promote increased BUE strength, coordination, and functional use for ADLs  6. Pt will complete lower body dressing with CGA using AE as needed        Timeframe: 7 visits    OCCUPATIONAL THERAPY: Daily Note PM   OT Visit Days: 2   Time In/Out  OT Charge Capture  Rehab Caseload Tracker  OT Orders    Alva Kramer is a 54 y.o. female   PRIMARY DIAGNOSIS: Cervical myelopathy (HCC)  Neck pain [M54.2]  Cervical myelopathy (HCC) [G95.9]  Spinal stenosis of cervical region [M48.02]  Weakness of both lower extremities [R29.898]  Incontinence of feces, unspecified fecal incontinence type [R15.9]  Procedure(s) (LRB):  CERVICAL LAMINECTOMY FUSION POSTERIOR   C3-C6 (N/A)  4 Days Post-Op  Inpatient: [place] / Plan[place] [employer] [place] [place] / Product Type: *No Product type* /     ASSESSMENT:     REHAB RECOMMENDATIONS:   Recommendation to date pending progress:  Setting:  Short-term Rehab    Equipment:    To Be Determined     ASSESSMENT:  Ms. Kramer remains limited by deficits in mobility, balance, coordination, sensation, and limitations of post op precautions impacting ADLs. Pt very ataxic/ unsteady and required min-mod X2 for functional mobility using rolling walker, mod A for standing balance during grooming tasks at sink. Min A LB dressing and for toileting. Pt required cues throughout session to maintain cervical precautions. Pt is progressing towards goals, continue POC.        SUBJECTIVE:     Ms. Kramer states, \"I want to walk.\"     Social/Functional Lives With: Family  Type of Home: Trailer  Home Layout: One level  Bathroom 
ACUTE OCCUPATIONAL THERAPY GOALS:   (Developed with and agreed upon by patient and/or caregiver.)  1. Pt will toilet with CGA   2. Pt will complete functional mobility for ADLs with contact guard assistance CGA  3. Pt will complete lower body dressing with CGA using AE as needed  4. Pt will complete grooming at sink with CGA  5. Pt will demonstrate independence with HEP to promote increased BUE strength, coordination, and functional use for ADLs  6. Pt will complete lower body dressing with CGA using AE as needed      Timeframe: 7 visits      OCCUPATIONAL THERAPY Initial Assessment and Daily Note       OT Visit Days: 1  Acknowledge Orders  Time  OT Charge Capture  Rehab Caseload Tracker      Alva Kramer is a 54 y.o. female   PRIMARY DIAGNOSIS: Cervical myelopathy (HCC)  Neck pain [M54.2]  Cervical myelopathy (HCC) [G95.9]  Spinal stenosis of cervical region [M48.02]  Weakness of both lower extremities [R29.898]  Incontinence of feces, unspecified fecal incontinence type [R15.9]  Procedure(s) (LRB):  CERVICAL LAMINECTOMY FUSION POSTERIOR   C3-C6 (N/A)  1 Day Post-Op  Reason for Referral: Generalized Muscle Weakness (M62.81)  Other lack of cordination (R27.8)  Repeated Falls (R29.6)  History of falling (Z91.81)  Cervicalgia (M54.2)  In[place]: [place] [employer] [place] / [place] [employer] [place] / Product Type: *No Product type* /     ASSESSMENT:     REHAB RECOMMENDATIONS:   Recommendation to date pending progress:  Setting:  Short-term Rehab    Equipment:    To Be Determined     ASSESSMENT:  Ms. Kramer was admitted with history of impaired UE sensation/ use of hands, falls, weakness, and impaired mobility. Now post op C3-6 laminectomy and fusion, has soft C-collar. Pt presented with deficits in mobility, balance, coordination, sensation, and activity tolerance impacting ADLs. Pt educated on cervical precautions, required frequent reminders to maintain throughout. Pt demonstrated LB dressing with 
ACUTE OCCUPATIONAL THERAPY GOALS:   (Developed with and agreed upon by patient and/or caregiver.)  1. Pt will toilet with CGA   2. Pt will complete functional mobility for ADLs with contact guard assistance CGA  3. Pt will complete lower body dressing with CGA using AE as needed  4. Pt will complete grooming at sink with CGA  5. Pt will demonstrate independence with HEP to promote increased BUE strength, coordination, and functional use for ADLs  6. Pt will complete lower body dressing with CGA using AE as needed      Timeframe: 7 visits    OCCUPATIONAL THERAPY: Daily Note AM  OT Visit Days: 6   Time In/Out  OT Charge Capture  Rehab Caseload Tracker  OT Orders    Spinal precautions, sock collar for comfort, FALLS RISK    Alva Kramer is a 54 y.o. female   PRIMARY DIAGNOSIS: Cervical myelopathy (HCC)  Neck pain [M54.2]  Cervical myelopathy (HCC) [G95.9]  Spinal stenosis of cervical region [M48.02]  Weakness of both lower extremities [R29.898]  Incontinence of feces, unspecified fecal incontinence type [R15.9]  Procedure(s) (LRB):  CERVICAL LAMINECTOMY FUSION POSTERIOR   C3-C6 (N/A)  9 Days Post-Op  Inpatient: [place] [employer] [place] / [place] [employer] [place] / Product Type: *No Product type* /     ASSESSMENT:     REHAB RECOMMENDATIONS:   Recommendation to date pending progress:  Setting:  Short-term Rehab    Equipment:    To Be Determined     ASSESSMENT:  Ms. Kramer presents up in the chair upon arrival. Pt had just finished working with PT but still agreeable to OT. Pt stated that she wanted to walk again and this therapist dissuaded her because she had just literally walked 250 ft with PT. She then inquired about taking a shower but myself and nursing did not feel like she was safe enough getting in and out of the shower. Pt agreeable to a through bath at the sink with soap and water vs wipes and a shampoo cap. Pt overall required only min a with TB bathing, min a with dressing, and SBA with 
ACUTE PHYSICAL THERAPY GOALS:   (Developed with and agreed upon by patient and/or caregiver.)  Pt will perform bed mobility SB(A) c inc time, cueing and rails as needed in 7 therapy sessions.  Pt will perform sit-to-stand/ stand-to-sit transfers Min (A) c LRAD and no LOB or miss-steps in 7 therapy sessions.  Pt will ambulate 125 ft Min (A) with use of LRAD, no LOB or miss-steps and breaks as needed in 7 therapy sessions.  Pt will perform standing dynamic balance activities with minimal postural sway in 7 therapy sessions.  Pt will tolerate multiple sets and reps of BLE exercises in 7 therapy sessions    PHYSICAL THERAPY Daily Note and AM  (Link to Caseload Tracking: PT Visit Days : 2  Acknowledge Orders  Time In/Out  PT Charge Capture  Rehab Caseload Tracker    Alva Kramer is a 54 y.o. female   PRIMARY DIAGNOSIS: Cervical myelopathy (HCC)  Neck pain [M54.2]  Cervical myelopathy (HCC) [G95.9]  Spinal stenosis of cervical region [M48.02]  Weakness of both lower extremities [R29.898]  Incontinence of feces, unspecified fecal incontinence type [R15.9]  Procedure(s) (LRB):  CERVICAL LAMINECTOMY FUSION POSTERIOR   C3-C6 (N/A)  9 Days Post-Op  Reason for Referral: Generalized Muscle Weakness (M62.81)  Difficulty in walking, Not elsewhere classified (R26.2)  History of falling (Z91.81)  Inpatient: [place] [employer] [place] / [place] [employer] [place] / Product Type: *No Product type* /     ASSESSMENT:     REHAB RECOMMENDATIONS:   Recommendation to date pending progress:  Setting:  Short-term Rehab    Equipment:    To Be Determined     ASSESSMENT:  Ms. Kramer has been seen by PT BID since her above surgery.  She has made good progress.  Today's treatment focused on transfers and ambulation.  Pt still requiring min-mod assist for gait due to ataxia, transfers are improving with verbal cueing for technique and safety.  Gait belt and close chair follow required for safety. Pt continues to function below 
ACUTE PHYSICAL THERAPY GOALS:   (Developed with and agreed upon by patient and/or caregiver.)  Pt will perform bed mobility SB(A) c inc time, cueing and rails as needed in 7 therapy sessions.  Pt will perform sit-to-stand/ stand-to-sit transfers Min (A) c LRAD and no LOB or miss-steps in 7 therapy sessions.  Pt will ambulate 125 ft Min (A) with use of LRAD, no LOB or miss-steps and breaks as needed in 7 therapy sessions.  Pt will perform standing dynamic balance activities with minimal postural sway in 7 therapy sessions.  Pt will tolerate multiple sets and reps of BLE exercises in 7 therapy sessions    PHYSICAL THERAPY Daily Note and AM  (Link to Caseload Tracking: PT Visit Days : 6  Acknowledge Orders  Time In/Out  PT Charge Capture  Rehab Caseload Tracker    Alva Kramer is a 54 y.o. female   PRIMARY DIAGNOSIS: Cervical myelopathy (HCC)  Neck pain [M54.2]  Cervical myelopathy (HCC) [G95.9]  Spinal stenosis of cervical region [M48.02]  Weakness of both lower extremities [R29.898]  Incontinence of feces, unspecified fecal incontinence type [R15.9]  Procedure(s) (LRB):  CERVICAL LAMINECTOMY FUSION POSTERIOR   C3-C6 (N/A)  12 Days Post-Op  Reason for Referral: Generalized Muscle Weakness (M62.81)  Difficulty in walking, Not elsewhere classified (R26.2)  History of falling (Z91.81)  Inpatient: [place] [employer] [place] / [place] [employer] [place] / Product Type: *No Product type* /     ASSESSMENT:     REHAB RECOMMENDATIONS:   Recommendation to date pending progress:  Setting:  Short-term Rehab  '  Equipment:    To Be Determined     ASSESSMENT:  Ms. Kramer is making slow progress toward goals with increased mobility and gait distances.  She remains unsteady at times and requires min A for occasional LOB.  The patient demonstrated decreased activity tolerance today and required seated rest breaks during gait.  She does well with transfers when cued for hand placement and technique.      [place] 
ACUTE PHYSICAL THERAPY GOALS:   (Developed with and agreed upon by patient and/or caregiver.)  Pt will perform bed mobility SB(A) c inc time, cueing and rails as needed in 7 therapy sessions.  Pt will perform sit-to-stand/ stand-to-sit transfers Min (A) c LRAD and no LOB or miss-steps in 7 therapy sessions.  Pt will ambulate 125 ft Min (A) with use of LRAD, no LOB or miss-steps and breaks as needed in 7 therapy sessions.  Pt will perform standing dynamic balance activities with minimal postural sway in 7 therapy sessions.  Pt will tolerate multiple sets and reps of BLE exercises in 7 therapy sessions    PHYSICAL THERAPY Daily Note and PM  (Link to Caseload Tracking: PT Visit Days : 1  Acknowledge Orders  Time In/Out  PT Charge Capture  Rehab Caseload Tracker    Alva Kramer is a 54 y.o. female   PRIMARY DIAGNOSIS: Cervical myelopathy (HCC)  Neck pain [M54.2]  Cervical myelopathy (HCC) [G95.9]  Spinal stenosis of cervical region [M48.02]  Weakness of both lower extremities [R29.898]  Incontinence of feces, unspecified fecal incontinence type [R15.9]  Procedure(s) (LRB):  CERVICAL LAMINECTOMY FUSION POSTERIOR   C3-C6 (N/A)  7 Days Post-Op  Reason for Referral: Generalized Muscle Weakness (M62.81)  Difficulty in walking, Not elsewhere classified (R26.2)  History of falling (Z91.81)  Inpatient: [place] [employer] [place] / [place] [employer] [place] / Product Type: *No Product type* /     ASSESSMENT:     REHAB RECOMMENDATIONS:   Recommendation to date pending progress:  Setting:  Short-term Rehab    Equipment:    To Be Determined     ASSESSMENT:  Ms. Kramer has been seen by PT BID since her above surgery.  She has made good progress.  She is still working towards goals as listed above.  Today she performed supine to sit with min assist.  Sitting balance is fair.  Sit to stand with min assist and extra time for getting her balance and gait with min assist using rolling walker.  Her gait is ataxic in 
ACUTE PHYSICAL THERAPY GOALS:   (Developed with and agreed upon by patient and/or caregiver.)  Pt will perform bed mobility SB(A) c inc time, cueing and rails as needed in 7 therapy sessions.  Pt will perform sit-to-stand/ stand-to-sit transfers Min (A) c LRAD and no LOB or miss-steps in 7 therapy sessions.  Pt will ambulate 125 ft Min (A) with use of LRAD, no LOB or miss-steps and breaks as needed in 7 therapy sessions.  Pt will perform standing dynamic balance activities with minimal postural sway in 7 therapy sessions.  Pt will tolerate multiple sets and reps of BLE exercises in 7 therapy sessions    PHYSICAL THERAPY Daily Note and PM  (Link to Caseload Tracking: PT Visit Days : 2  Acknowledge Orders  Time In/Out  PT Charge Capture  Rehab Caseload Tracker    Alva Kramer is a 54 y.o. female   PRIMARY DIAGNOSIS: Cervical myelopathy (HCC)  Neck pain [M54.2]  Cervical myelopathy (HCC) [G95.9]  Spinal stenosis of cervical region [M48.02]  Weakness of both lower extremities [R29.898]  Incontinence of feces, unspecified fecal incontinence type [R15.9]  Procedure(s) (LRB):  CERVICAL LAMINECTOMY FUSION POSTERIOR   C3-C6 (N/A)  8 Days Post-Op  Reason for Referral: Generalized Muscle Weakness (M62.81)  Difficulty in walking, Not elsewhere classified (R26.2)  History of falling (Z91.81)  Inpatient: [place] [employer] [place] / [place] [employer] [place] / Product Type: *No Product type* /     ASSESSMENT:     REHAB RECOMMENDATIONS:   Recommendation to date pending progress:  Setting:  Short-term Rehab    Equipment:    To Be Determined     ASSESSMENT:  Ms. Kramer has been seen by PT BID since her above surgery.  She has made good progress.  Today's treatment focused on transfers and ambulation.  Pt still requiring min-mod assist for gait due to ataxia, transfers are improving with verbal cueing for technique and safety.  Gait belt and close chair follow required for safety. Pt continues to function below 
ACUTE PHYSICAL THERAPY GOALS:   (Developed with and agreed upon by patient and/or caregiver.)  Pt will perform bed mobility SB(A) c inc time, cueing and rails as needed in 7 therapy sessions.  Pt will perform sit-to-stand/ stand-to-sit transfers Min (A) c LRAD and no LOB or miss-steps in 7 therapy sessions.  Pt will ambulate 125 ft Min (A) with use of LRAD, no LOB or miss-steps and breaks as needed in 7 therapy sessions.  Pt will perform standing dynamic balance activities with minimal postural sway in 7 therapy sessions.  Pt will tolerate multiple sets and reps of BLE exercises in 7 therapy sessions    PHYSICAL THERAPY Daily Note and PM  (Link to Caseload Tracking: PT Visit Days : 2  Acknowledge Orders  Time In/Out  PT Charge Capture  Rehab Caseload Tracker    Alva Kramer is a 54 y.o. female   PRIMARY DIAGNOSIS: Cervical myelopathy (HCC)  Neck pain [M54.2]  Cervical myelopathy (HCC) [G95.9]  Spinal stenosis of cervical region [M48.02]  Weakness of both lower extremities [R29.898]  Incontinence of feces, unspecified fecal incontinence type [R15.9]  Procedure(s) (LRB):  CERVICAL LAMINECTOMY FUSION POSTERIOR   C3-C6 (N/A)  8 Days Post-Op  Reason for Referral: Generalized Muscle Weakness (M62.81)  Difficulty in walking, Not elsewhere classified (R26.2)  History of falling (Z91.81)  Inpatient: [place] [employer] [place] / [place] [employer] [place] / Product Type: *No Product type* /     ASSESSMENT:     REHAB RECOMMENDATIONS:   Recommendation to date pending progress:  Setting:  Short-term Rehab    Equipment:    To Be Determined     ASSESSMENT:  Ms. Kramer has been seen by PT BID since her above surgery.  She has made good progress.  Today's treatment focused on transfers and ambulation.  Pt still requiring min-mod assist for gait due to ataxia, transfers are improving with verbal cueing for technique and safety.  Gait belt and close chair follow required for safety. Pt continues to function below 
ACUTE PHYSICAL THERAPY GOALS:   (Developed with and agreed upon by patient and/or caregiver.)  Pt will perform bed mobility SB(A) c inc time, cueing and rails as needed in 7 therapy sessions.  Pt will perform sit-to-stand/ stand-to-sit transfers Min (A) c LRAD and no LOB or miss-steps in 7 therapy sessions.  Pt will ambulate 125 ft Min (A) with use of LRAD, no LOB or miss-steps and breaks as needed in 7 therapy sessions.  Pt will perform standing dynamic balance activities with minimal postural sway in 7 therapy sessions.  Pt will tolerate multiple sets and reps of BLE exercises in 7 therapy sessions    PHYSICAL THERAPY Daily Note and PM  (Link to Caseload Tracking: PT Visit Days : 4  Acknowledge Orders  Time In/Out  PT Charge Capture  Rehab Caseload Tracker    Alva Kramer is a 54 y.o. female   PRIMARY DIAGNOSIS: Cervical myelopathy (HCC)  Neck pain [M54.2]  Cervical myelopathy (HCC) [G95.9]  Spinal stenosis of cervical region [M48.02]  Weakness of both lower extremities [R29.898]  Incontinence of feces, unspecified fecal incontinence type [R15.9]  Procedure(s) (LRB):  CERVICAL LAMINECTOMY FUSION POSTERIOR   C3-C6 (N/A)  10 Days Post-Op  Reason for Referral: Generalized Muscle Weakness (M62.81)  Difficulty in walking, Not elsewhere classified (R26.2)  History of falling (Z91.81)  Inpatient: [place] [employer] [place] / [place] [employer] [place] / Product Type: *No Product type* /     ASSESSMENT:     REHAB RECOMMENDATIONS:   Recommendation to date pending progress:  Setting:  Short-term Rehab    Equipment:    To Be Determined     ASSESSMENT:  Ms. Kramer is a 54 years old female and she has been participating in therapy BID and showing slow progress towards the goals. Pt requires cues to stay on task \" off and on \" due to impulsive during therapy session.  Pt following directions this PM with sit to standing and transfer to the recliner with rolling walker and turning all the way around and backing 
ACUTE PHYSICAL THERAPY GOALS:   (Developed with and agreed upon by patient and/or caregiver.)  Pt will perform bed mobility SB(A) c inc time, cueing and rails as needed in 7 therapy sessions.  Pt will perform sit-to-stand/ stand-to-sit transfers Min (A) c LRAD and no LOB or miss-steps in 7 therapy sessions.  Pt will ambulate 125 ft Min (A) with use of LRAD, no LOB or miss-steps and breaks as needed in 7 therapy sessions.  Pt will perform standing dynamic balance activities with minimal postural sway in 7 therapy sessions.  Pt will tolerate multiple sets and reps of BLE exercises in 7 therapy sessions    PHYSICAL THERAPY Daily Note and PM  (Link to Caseload Tracking: PT Visit Days : 5  Acknowledge Orders  Time In/Out  PT Charge Capture  Rehab Caseload Tracker    Alva Kramer is a 54 y.o. female   PRIMARY DIAGNOSIS: Cervical myelopathy (HCC)  Neck pain [M54.2]  Cervical myelopathy (HCC) [G95.9]  Spinal stenosis of cervical region [M48.02]  Weakness of both lower extremities [R29.898]  Incontinence of feces, unspecified fecal incontinence type [R15.9]  Procedure(s) (LRB):  CERVICAL LAMINECTOMY FUSION POSTERIOR   C3-C6 (N/A)  11 Days Post-Op  Reason for Referral: Generalized Muscle Weakness (M62.81)  Difficulty in walking, Not elsewhere classified (R26.2)  History of falling (Z91.81)  Inpatient: [place] [employer] [place] / [place] [employer] [place] / Product Type: *No Product type* /     ASSESSMENT:     REHAB RECOMMENDATIONS:   Recommendation to date pending progress:  Setting:  Short-term Rehab  '  Equipment:    To Be Determined     ASSESSMENT:  Ms. Kramer was sitting on contact and agreeable to work with therapy.  She stood and was assisted in changing her brief. She then amb 80' x 3 with a rolling walker, gt belt and chair follow with min/ mod a for balance. Tends to hyperextend her L knee and has decreased heel strike B. Pt returned to bed.  Pt needed repeated cues for reaching back to arm of chair 
ACUTE PHYSICAL THERAPY GOALS:   (Developed with and agreed upon by patient and/or caregiver.)  Pt will perform bed mobility SB(A) c inc time, cueing and rails as needed in 7 therapy sessions.  Pt will perform sit-to-stand/ stand-to-sit transfers Min (A) c LRAD and no LOB or miss-steps in 7 therapy sessions.  Pt will ambulate 125 ft Min (A) with use of LRAD, no LOB or miss-steps and breaks as needed in 7 therapy sessions.  Pt will perform standing dynamic balance activities with minimal postural sway in 7 therapy sessions.  Pt will tolerate multiple sets and reps of BLE exercises in 7 therapy sessions    PHYSICAL THERAPY Daily Note, Re-evaluation, and AM  (Link to Caseload Tracking: PT Visit Days : 1  Acknowledge Orders  Time In/Out  PT Charge Capture  Rehab Caseload Tracker    Alva Kramer is a 54 y.o. female   PRIMARY DIAGNOSIS: Cervical myelopathy (HCC)  Neck pain [M54.2]  Cervical myelopathy (HCC) [G95.9]  Spinal stenosis of cervical region [M48.02]  Weakness of both lower extremities [R29.898]  Incontinence of feces, unspecified fecal incontinence type [R15.9]  Procedure(s) (LRB):  CERVICAL LAMINECTOMY FUSION POSTERIOR   C3-C6 (N/A)  7 Days Post-Op  Reason for Referral: Generalized Muscle Weakness (M62.81)  Difficulty in walking, Not elsewhere classified (R26.2)  History of falling (Z91.81)  Inpatient: [place] [employer] [place] [employer] [place] / Product Type: *No Product type* /     ASSESSMENT:     REHAB RECOMMENDATIONS:   Recommendation to date pending progress:  Setting:  Short-term Rehab    Equipment:    To Be Determined     ASSESSMENT:  Ms. Kramer has been seen by PT BID since her above surgery.  She has made good progress.  She is still working towards goals as listed above.  Today she performed supine to sit with min assist.  Sitting balance is fair.  Sit to stand with min assist and extra time for getting her balance and gait with min assist using rolling walker.  Her gait is 
ACUTE PHYSICAL THERAPY GOALS:   (Developed with and agreed upon by patient and/or caregiver.)  Pt will perform bed mobility SB(A) c inc time, cueing and rails as needed in 7 therapy sessions.  Pt will perform sit-to-stand/ stand-to-sit transfers Min (A) c LRAD and no LOB or miss-steps in 7 therapy sessions.  Pt will ambulate 125 ft Min (A) with use of LRAD, no LOB or miss-steps and breaks as needed in 7 therapy sessions.  Pt will perform standing dynamic balance activities with minimal postural sway in 7 therapy sessions.  Pt will tolerate multiple sets and reps of BLE exercises in 7 therapy sessions.      PHYSICAL THERAPY Initial Assessment, Daily Note, and AM  (Link to Caseload Tracking: PT Visit Days : 1  Acknowledge Orders  Time In/Out  PT Charge Capture  Rehab Caseload Tracker    SPINAL PRECAUTIONS  SOFT COLLAR  FALL RISK    Alva Kramer is a 54 y.o. female   PRIMARY DIAGNOSIS: Cervical myelopathy (HCC)  Neck pain [M54.2]  Cervical myelopathy (HCC) [G95.9]  Spinal stenosis of cervical region [M48.02]  Weakness of both lower extremities [R29.898]  Incontinence of feces, unspecified fecal incontinence type [R15.9]  Procedure(s) (LRB):  CERVICAL LAMINECTOMY FUSION POSTERIOR   C3-C6 (N/A)  1 Day Post-Op  Reason for Referral: Other lack of cordination (R27.8)  Difficulty in walking, Not elsewhere classified (R26.2)  Other abnormalities of gait and mobility (R26.89)  Repeated Falls (R29.6)  History of falling (Z91.81)  Inpatient: Payor[place] [employer] [place] / [place] [employer] [place] / Product Type: *No Product type* /     ASSESSMENT:     REHAB RECOMMENDATIONS:   Recommendation to date pending progress:  Setting:  Short-term Rehab    Equipment:    To Be Determined     ASSESSMENT:  Ms. Kramer Is a 54 y.o. female presenting to PT POD 1 s/p C3-6 Laminectomy and posterior fusion; spinal precautions now in place. At time of initial evaluation, pt presents below baseline LOF with deficits in bed mobility, 
ACUTE PHYSICAL THERAPY GOALS:   (Developed with and agreed upon by patient and/or caregiver.)  Pt will perform bed mobility SB(A) c inc time, cueing and rails as needed in 7 therapy sessions.  Pt will perform sit-to-stand/ stand-to-sit transfers Min (A) c LRAD and no LOB or miss-steps in 7 therapy sessions.  Pt will ambulate 125 ft Min (A) with use of LRAD, no LOB or miss-steps and breaks as needed in 7 therapy sessions.  Pt will perform standing dynamic balance activities with minimal postural sway in 7 therapy sessions.  Pt will tolerate multiple sets and reps of BLE exercises in 7 therapy sessions.    PHYSICAL THERAPY: Daily Note AM   (Link to Caseload Tracking: PT Visit Days : 3  Time In/Out PT Charge Capture  Rehab Caseload Tracker  Orders    SPINAL PRECAUTIONS  SOFT COLLAR  FALL RISK    Alva Kramer is a 54 y.o. female   PRIMARY DIAGNOSIS: Cervical myelopathy (HCC)  Neck pain [M54.2]  Cervical myelopathy (HCC) [G95.9]  Spinal stenosis of cervical region [M48.02]  Weakness of both lower extremities [R29.898]  Incontinence of feces, unspecified fecal incontinence type [R15.9]  Procedure(s) (LRB):  CERVICAL LAMINECTOMY FUSION POSTERIOR   C3-C6 (N/A)  3 Days Post-Op  Inpatient: [place] [employer] [place] / [place] [employer] [place] / Product Type: *No Product type* /     ASSESSMENT:     REHAB RECOMMENDATIONS:   Recommendation to date pending progress:  Setting:  Short-term Rehab    Equipment:    To Be Determined     ASSESSMENT:  Ms. Kramer was supine on contact and agreeable to work with therapy. The PT session today focused on following precautions while performing bed mobility, sit <> stand, sitting and standing balance and LE ex. Pt was unable to take steps for amb . LE's appeared unstable and pt having trouble controlling them. She was able to stand and perform marching and side steps using RW and min a. Performed ex at EOB. Very cooperative though anxious and fearful. All activity is slow and 
ACUTE PHYSICAL THERAPY GOALS:   (Developed with and agreed upon by patient and/or caregiver.)  Pt will perform bed mobility SB(A) c inc time, cueing and rails as needed in 7 therapy sessions.  Pt will perform sit-to-stand/ stand-to-sit transfers Min (A) c LRAD and no LOB or miss-steps in 7 therapy sessions.  Pt will ambulate 125 ft Min (A) with use of LRAD, no LOB or miss-steps and breaks as needed in 7 therapy sessions.  Pt will perform standing dynamic balance activities with minimal postural sway in 7 therapy sessions.  Pt will tolerate multiple sets and reps of BLE exercises in 7 therapy sessions.    PHYSICAL THERAPY: Daily Note AM   (Link to Caseload Tracking: PT Visit Days : 4  Time In/Out PT Charge Capture  Rehab Caseload Tracker  Orders    SPINAL PRECAUTIONS  SOFT COLLAR  FALL RISK    Alva Kramer is a 54 y.o. female   PRIMARY DIAGNOSIS: Cervical myelopathy (HCC)  Neck pain [M54.2]  Cervical myelopathy (HCC) [G95.9]  Spinal stenosis of cervical region [M48.02]  Weakness of both lower extremities [R29.898]  Incontinence of feces, unspecified fecal incontinence type [R15.9]  Procedure(s) (LRB):  CERVICAL LAMINECTOMY FUSION POSTERIOR   C3-C6 (N/A)  4 Days Post-Op  [place]patient: [place] [employer] [place] [employer] [place] / Product Type: *No Product type* /     ASSESSMENT:     REHAB RECOMMENDATIONS:   Recommendation to date pending progress:  Setting:  Short-term Rehab    Equipment:    To Be Determined     ASSESSMENT:  Ms. Kramer was supine on contact and agreeable to work with therapy. The PT session this PM focused on following precautions while performing bed mobility, sit <> stand, sitting and standing balance. Pt transferred to chair. Chair was taken into hallway where she amb 80' x 3 with seated rests. Used a chair follow. She used a RW and needed min to mod a.for amb. Pt tends to lean anteriorly and as she fatigues this increases. She was excited that she was able to do as much as 
ACUTE PHYSICAL THERAPY GOALS:   (Developed with and agreed upon by patient and/or caregiver.)  Pt will perform bed mobility SB(A) c inc time, cueing and rails as needed in 7 therapy sessions.  Pt will perform sit-to-stand/ stand-to-sit transfers Min (A) c LRAD and no LOB or miss-steps in 7 therapy sessions.  Pt will ambulate 125 ft Min (A) with use of LRAD, no LOB or miss-steps and breaks as needed in 7 therapy sessions.  Pt will perform standing dynamic balance activities with minimal postural sway in 7 therapy sessions.  Pt will tolerate multiple sets and reps of BLE exercises in 7 therapy sessions.    PHYSICAL THERAPY: Daily Note AM   (Link to Caseload Tracking: PT Visit Days : 6  Time In/Out PT Charge Capture  Rehab Caseload Tracker  Orders    SPINAL PRECAUTIONS  SOFT COLLAR  FALL RISK    Alva Kramer is a 54 y.o. female   PRIMARY DIAGNOSIS: Cervical myelopathy (HCC)  Neck pain [M54.2]  Cervical myelopathy (HCC) [G95.9]  Spinal stenosis of cervical region [M48.02]  Weakness of both lower extremities [R29.898]  Incontinence of feces, unspecified fecal incontinence type [R15.9]  Procedure(s) (LRB):  CERVICAL LAMINECTOMY FUSION POSTERIOR   C3-C6 (N/A)  6 Days Post-Op  Inpatient: [place] [employer] [place] / [place] [employer] [place] / Product Type: *No Product type* /     ASSESSMENT:     REHAB RECOMMENDATIONS:   Recommendation to date pending progress:  Setting:  Short-term Rehab    Equipment:    To Be Determined     ASSESSMENT:  Ms. Kramer is making slow progress toward goals. The patient sat to EOB with min A and cues for log rolling.  She performed B LE ex and practiced sit to stands before amb. The patient ambulated using  RW, gt belt and a chair follow.   She required additional time for gait and several seated rest breaks. She tends to be impulsive and to lean anteriorly so required repeated cues for safety awareness as well as a chair follow closely. As she begins to fatigue she requires 
ACUTE PHYSICAL THERAPY GOALS:   (Developed with and agreed upon by patient and/or caregiver.)  Pt will perform bed mobility SB(A) c inc time, cueing and rails as needed in 7 therapy sessions.  Pt will perform sit-to-stand/ stand-to-sit transfers Min (A) c LRAD and no LOB or miss-steps in 7 therapy sessions.  Pt will ambulate 125 ft Min (A) with use of LRAD, no LOB or miss-steps and breaks as needed in 7 therapy sessions.  Pt will perform standing dynamic balance activities with minimal postural sway in 7 therapy sessions.  Pt will tolerate multiple sets and reps of BLE exercises in 7 therapy sessions.    PHYSICAL THERAPY: Daily Note PM   (Link to Caseload Tracking: PT Visit Days : 1  Time In/Out PT Charge Capture  Rehab Caseload Tracker  Orders    SPINAL PRECAUTIONS  SOFT COLLAR  FALL RISK    Alva Kramer is a 54 y.o. female   PRIMARY DIAGNOSIS: Cervical myelopathy (HCC)  Neck pain [M54.2]  Cervical myelopathy (HCC) [G95.9]  Spinal stenosis of cervical region [M48.02]  Weakness of both lower extremities [R29.898]  Incontinence of feces, unspecified fecal incontinence type [R15.9]  Procedure(s) (LRB):  CERVICAL LAMINECTOMY FUSION POSTERIOR   C3-C6 (N/A)  1 Day Post-Op  Inpatient: [place] [employer] [place] / [place] [employer] [place] / Product Type: *No Product type* /     ASSESSMENT:     REHAB RECOMMENDATIONS:   Recommendation to date pending progress:  Setting:  Short-term Rehab    Equipment:    To Be Determined     ASSESSMENT:  Ms. Kramer was supine in bed on RA upon entering the room and agreeable to therapy. This PM, pt demonstrates slow but continued progress since AM initial evaluation. This session, pt required Mod (A) for bed mobility and sit-to-stand while requiring Min (A) and additional use of RW/gait belt for ambulation of 15'x1. Pt's gait continuing to show decreased tammie, inc knee extension VERO, decreased step length/ foot clearance VERO, inconsistent SANYA width with near scissoring 
ACUTE PHYSICAL THERAPY GOALS:   (Developed with and agreed upon by patient and/or caregiver.)  Pt will perform bed mobility SB(A) c inc time, cueing and rails as needed in 7 therapy sessions.  Pt will perform sit-to-stand/ stand-to-sit transfers Min (A) c LRAD and no LOB or miss-steps in 7 therapy sessions.  Pt will ambulate 125 ft Min (A) with use of LRAD, no LOB or miss-steps and breaks as needed in 7 therapy sessions.  Pt will perform standing dynamic balance activities with minimal postural sway in 7 therapy sessions.  Pt will tolerate multiple sets and reps of BLE exercises in 7 therapy sessions.    PHYSICAL THERAPY: Daily Note PM   (Link to Caseload Tracking: PT Visit Days : 2  Time In/Out PT Charge Capture  Rehab Caseload Tracker  Orders    SPINAL PRECAUTIONS  SOFT COLLAR  FALL RISK    Alva Kramer is a 54 y.o. female   PRIMARY DIAGNOSIS: Cervical myelopathy (HCC)  Neck pain [M54.2]  Cervical myelopathy (HCC) [G95.9]  Spinal stenosis of cervical region [M48.02]  Weakness of both lower extremities [R29.898]  Incontinence of feces, unspecified fecal incontinence type [R15.9]  Procedure(s) (LRB):  CERVICAL LAMINECTOMY FUSION POSTERIOR   C3-C6 (N/A)  2 Days Post-Op  Inpatient: Payor: [employer] [place] / Plan[place] [employer] [place] / Product Type: *No Product type* /     ASSESSMENT:     REHAB RECOMMENDATIONS:   Recommendation to date pending progress:  Setting:  Short-term Rehab    Equipment:    To Be Determined     ASSESSMENT:  Ms. Kramer was supine in bed on RA upon entering the room and agreeable to therapy. This AM, pt demonstrates relatively consistent performance with previous session. Today, pt continued to require Mod (A) for bed mobility and sit-to-stand into RW while requiring Min (A) and additional use of RW/gait belt for ambulation of 15'x1. Pt's gait continuing to notable coordination deficits associated with decreased tammie, inc knee extension VERO, decreased step length/ foot 
ACUTE PHYSICAL THERAPY GOALS:   (Developed with and agreed upon by patient and/or caregiver.)  Pt will perform bed mobility SB(A) c inc time, cueing and rails as needed in 7 therapy sessions.  Pt will perform sit-to-stand/ stand-to-sit transfers Min (A) c LRAD and no LOB or miss-steps in 7 therapy sessions.  Pt will ambulate 125 ft Min (A) with use of LRAD, no LOB or miss-steps and breaks as needed in 7 therapy sessions.  Pt will perform standing dynamic balance activities with minimal postural sway in 7 therapy sessions.  Pt will tolerate multiple sets and reps of BLE exercises in 7 therapy sessions.    PHYSICAL THERAPY: Daily Note PM   (Link to Caseload Tracking: PT Visit Days : 4  Time In/Out PT Charge Capture  Rehab Caseload Tracker  Orders    SPINAL PRECAUTIONS  SOFT COLLAR  FALL RISK    Alva Kramer is a 54 y.o. female   PRIMARY DIAGNOSIS: Cervical myelopathy (HCC)  Neck pain [M54.2]  Cervical myelopathy (HCC) [G95.9]  Spinal stenosis of cervical region [M48.02]  Weakness of both lower extremities [R29.898]  Incontinence of feces, unspecified fecal incontinence type [R15.9]  Procedure(s) (LRB):  CERVICAL LAMINECTOMY FUSION POSTERIOR   C3-C6 (N/A)  4 Days Post-Op  Inpatient: [place] [employer] [place] / [place] [employer] [place] / Product Type: *No Product type* /     ASSESSMENT:     REHAB RECOMMENDATIONS:   Recommendation to date pending progress:  Setting:  Short-term Rehab    Equipment:    To Be Determined     ASSESSMENT:  Ms. Kramer was supine on contact and agreeable to work with therapy with a little encouragement since she had just returned to bed about 20 minutes earlier.  The PT session this PM focused on following precautions while performing bed mobility, sit <> stand, sitting and standing balance.  She stood and amb to the sink and brushed her teeth. Amb back she had a LOB and required mod a x 2 to correct. used a chair follow and RW to amb 3 x 80'. She needed  min to mod a for for 
ACUTE PHYSICAL THERAPY GOALS:   (Developed with and agreed upon by patient and/or caregiver.)  Pt will perform bed mobility SB(A) c inc time, cueing and rails as needed in 7 therapy sessions.  Pt will perform sit-to-stand/ stand-to-sit transfers Min (A) c LRAD and no LOB or miss-steps in 7 therapy sessions.  Pt will ambulate 125 ft Min (A) with use of LRAD, no LOB or miss-steps and breaks as needed in 7 therapy sessions.  Pt will perform standing dynamic balance activities with minimal postural sway in 7 therapy sessions.  Pt will tolerate multiple sets and reps of BLE exercises in 7 therapy sessions.    PHYSICAL THERAPY: Daily Note PM   (Link to Caseload Tracking: PT Visit Days : 5  Time In/Out PT Charge Capture  Rehab Caseload Tracker  Orders    SPINAL PRECAUTIONS  SOFT COLLAR  FALL RISK    Alva Kramer is a 54 y.o. female   PRIMARY DIAGNOSIS: Cervical myelopathy (HCC)  Neck pain [M54.2]  Cervical myelopathy (HCC) [G95.9]  Spinal stenosis of cervical region [M48.02]  Weakness of both lower extremities [R29.898]  Incontinence of feces, unspecified fecal incontinence type [R15.9]  Procedure(s) (LRB):  CERVICAL LAMINECTOMY FUSION POSTERIOR   C3-C6 (N/A)  5 Days Post-Op  Inpatient: [place] [employer] [place] / [place] [employer] [place] / Product Type: *No Product type* /     ASSESSMENT:     REHAB RECOMMENDATIONS:   Recommendation to date pending progress:  Setting:  Short-term Rehab    Equipment:    To Be Determined     ASSESSMENT:  Ms. Kramer is making slow progress toward goals with increased gait distances. The patient performed bed mobility with min A for good log rolling technique and demonstrated good sitting balance.  She requried additional cueing for  safety awareness as  she does present with impulsive behavior at  times.  The patient ambulated using the RW with cueing for posture, gait  technique, and walker management.  She required additional time for gait and several seated rest breaks. 
ACUTE PHYSICAL THERAPY GOALS:   (Developed with and agreed upon by patient and/or caregiver.)  Pt will perform bed mobility SB(A) c inc time, cueing and rails as needed in 7 therapy sessions.  Pt will perform sit-to-stand/ stand-to-sit transfers Min (A) c LRAD and no LOB or miss-steps in 7 therapy sessions.  Pt will ambulate 125 ft Min (A) with use of LRAD, no LOB or miss-steps and breaks as needed in 7 therapy sessions.  Pt will perform standing dynamic balance activities with minimal postural sway in 7 therapy sessions.  Pt will tolerate multiple sets and reps of BLE exercises in 7 therapy sessions.    PHYSICAL THERAPY: Daily Note PM   (Link to Caseload Tracking: PT Visit Days : 6  Time In/Out PT Charge Capture  Rehab Caseload Tracker  Orders    SPINAL PRECAUTIONS  SOFT COLLAR  FALL RISK    Alva Kramer is a 54 y.o. female   PRIMARY DIAGNOSIS: Cervical myelopathy (HCC)  Neck pain [M54.2]  Cervical myelopathy (HCC) [G95.9]  Spinal stenosis of cervical region [M48.02]  Weakness of both lower extremities [R29.898]  Incontinence of feces, unspecified fecal incontinence type [R15.9]  Procedure(s) (LRB):  CERVICAL LAMINECTOMY FUSION POSTERIOR   C3-C6 (N/A)  6 Days Post-Op  Inpatient: [place] [employer] [place] / [place] [employer] [place] / Product Type: *No Product type* /     ASSESSMENT:     REHAB RECOMMENDATIONS:   Recommendation to date pending progress:  Setting:  Short-term Rehab    Equipment:    To Be Determined     ASSESSMENT:  Ms. Kramer sat to EOB with min A and cues for log rolling. She attempted to amb x5 time,each time she would lose her balance and have to quickly sit. Twice she was able to amb 5' with mod a x 2 only to have to quickly sit again. She tends to be impulsive so a chair followed closely. Left in chair with needs in reach.      SUBJECTIVE:   Ms. Kramer states \"My arms are hurting\".     Social/Functional Lives With: Family  Type of Home: Trailer  Home Layout: One level  Bathroom 
Asked Dr. Du radiologist about shunt. Proceed with exam. Patient needs to have shunt checked within 24 hours if programmable. Patient does not know.   
Brief orthopedic spine note.    Patient is status post C3-C6 laminectomy and fusion.  She is seen and evaluated PACU in his recovery well.  She is little bit groggy.  She is able to follow commands and move her extremities.    Assessment: Status post C3-C6 laminectomy and fusion for myelopathy    Plan;  -Postop antibiotics  -PT OT  -Monitor drain output.  -Soft collar  -mechanical DVT ppx only, No chemical DVT prophylaxis until cleared by spine  -Regular diet      Jaquan Reyna MD  Orthopaedic Spine Surgery     
Comprehensive Nutrition Assessment    Type and Reason for Visit: Initial, RD Nutrition Re-Screen/LOS  Length of Stay    Nutrition Recommendations/Plan:   Meals and Snacks:  Diet: Continue current order     Malnutrition Assessment:  Malnutrition Status: No malnutrition     Nutrition Assessment:  Nutrition History: Pt denies any recent changes in intake pta.      Do You Have Any Cultural, [employer]alism, or Ethnic Food Preferences?: No   Weight History: Pt is unsure of any recent wt changes. She denies any changes in the fit of her clothes. Per EMR wt hx review of neurology office visits 5/26 199lb, 8/11 203lb, 2/13 187lb.  Nutrition Background:       PMH significant for creast cancer, cervical myelopathy, MS, and transverse myelitis. Pt admitted with cervical myelopathy. S.p cervical laminectomy C3-C6 4/18.   Nutrition Interval:  Pt seen sitting in bed. She reports she is consuming more than 75% of her meals. Discussed with [place] and Stephenie RNs.      Current Nutrition Therapies:  ADULT DIET; Regular    Current Intake:   Average Meal Intake: %        Anthropometric Measures:  Height: 170.2 cm (5' 7\")  Current Body Wt: 87.8 kg (193 lb 9 oz) (4/17), Weight source: Stated  BMI: 30.3, Obese Class 1 (BMI 30.0-34.9)  Admission Body Weight: 87.8 kg (193 lb 9 oz) (4/17- stated)  Ideal Body Weight (Kg) (Calculated): 61 kg (135 lbs),    BMI Category Obese Class 1 (BMI 30.0-34.9)  Estimated Daily Nutrient Needs:  Energy (kcal/day): 6439-2625 (18-22 kcal/kg) (Kcal/kg Weight used: 87.8 kg Current  Protein (g/day): 61-73 (1-1.2 g/kg) Weight Used: (Ideal) 61 kg  Fluid (ml/day):   (1 ml/kcal)    Nutrition Diagnosis:   No nutrition diagnosis at this time   Nutrition Interventions:   Food and/or Nutrient Delivery: Continue Current Diet     Coordination of Nutrition Care: Interdisciplinary Rounds      Goals:      Active Goal: Meet at least 75% of estimated needs, by next RD assessment       Nutrition Monitoring and Evaluation:    
Hourly rounds in progress. Medicated for neck pain x 1.  Soft brace on.  Bed in low locked position. Call light within reach.  Bed alarm on.  Will continue to monitor and give report to oncoming day shift nurse.    
Nurse checking on medication for MRI claustrophobia.  
ORTHO PROGRESS NOTE    2024    Admit Date: 2024  Post Op day: 11 Days Post-Op      Subjective:     Alva Kramer is a patient who is now 11 Days Post-Op  and  still continues to have persistent numbness in her arms that she had prior to surgery. .       Objective:     PT/OT:    Slow progress    Vital Signs:    Patient Vitals for the past 8 hrs:   BP Temp Temp src Pulse Resp SpO2   24 1043 -- -- -- -- 18 --   24 0751 116/71 97.5 °F (36.4 °C) Oral 75 17 94 %   24 0408 -- -- -- -- 16 --       Temp (24hrs), Av.6 °F (36.4 °C), Min:97.5 °F (36.4 °C), Max:97.7 °F (36.5 °C)      LAB:    No results for input(s): \"HGB\", \"WBC\", \"PLT\" in the last 72 hours.      Physical Exam:    Awake and in no acute distress.  Mood and affect appropriate.  Respirations unlabored and no evidence cyanosis.  Calves nontender.  Abdomen soft and nontender.  Dressing clean dry and intact.  Sensory testing:          C5    C6    C7    C8     T1   Right 1 1 1 1 1   Left 1 1 1 1 1      0=absent; 1=altered; 2=normal; NT= not tested  Reflexes     Strength testing in the upper extremity reveals the following based on the 5 point grading scale:       Biceps  (C5) WE  (C6) Tricep (C7) FDP   (C8) Intrinsics   (T1)   Right 4 3 4 3 3   Left 4 3 4 3 3   0=total paralysis; 1=palpable or visible contraction; 2= active movement with gravity eliminated; 3= active movement against gravity; 4= active movement against moderate resistance; 5= active movement against full resistance        Sensory testing:       L2 L3 L4 L5 S1 S2   Right 1 1 1 1 1 1   Left 1 1 1 1 1 1      0=absent; 1=altered; 2=normal; NT= not tested  Reflexes     Strength testing in the lower extremity reveals the following based on the 5 point grading scale:       HF  (L2) KE (L3/4) ADF (L4) EHL (L5) APF (S1)   Right 3 3 4 3 3   Left 3 3 3 3 3   0=total paralysis; 1=palpable or visible contraction; 2= active movement with gravity eliminated; 3= active movement against 
ORTHO PROGRESS NOTE    2024    Admit Date: 2024  Post Op day: 5 Days Post-Op      Subjective:     Alva Kramer is a patient who is now 5 Days Post-Op  and  is doing well this morning  .       Objective:     PT/OT:    Slow progress    Vital Signs:    Patient Vitals for the past 8 hrs:   BP Temp Temp src Pulse Resp SpO2   24 0720 103/60 99 °F (37.2 °C) Oral 89 17 90 %   24 0623 -- -- -- -- 16 --   24 0553 -- -- -- -- 18 --       Temp (24hrs), Av.5 °F (37.5 °C), Min:99 °F (37.2 °C), Max:100 °F (37.8 °C)      LAB:    Recent Labs     24  0528   HGB 13.2   WBC 10.3            Physical Exam:    Awake and in no acute distress.  Mood and affect appropriate.  Respirations unlabored and no evidence cyanosis.  Calves nontender.  Abdomen soft and nontender.  Dressing  small amount of strike through  Sensory testing:          C5    C6    C7    C8     T1   Right 1 1 1 1 1   Left 1 1 1 1 1      0=absent; 1=altered; 2=normal; NT= not tested  Reflexes     Strength testing in the upper extremity reveals the following based on the 5 point grading scale:       Biceps  (C5) WE  (C6) Tricep (C7) FDP   (C8) Intrinsics   (T1)   Right 4 3 4 3 3   Left 4 3 4 3 3   0=total paralysis; 1=palpable or visible contraction; 2= active movement with gravity eliminated; 3= active movement against gravity; 4= active movement against moderate resistance; 5= active movement against full resistance              Sensory testing:       L2 L3 L4 L5 S1 S2   Right 1 1 1 1 1 1   Left 1 1 1 1 1 1      0=absent; 1=altered; 2=normal; NT= not tested  Reflexes     Strength testing in the lower extremity reveals the following based on the 5 point grading scale:       HF  (L2) KE (L3/4) ADF (L4) EHL (L5) APF (S1)   Right 3 3 4 3 3   Left 3 3 3 3 3   0=total paralysis; 1=palpable or visible contraction; 2= active movement with gravity eliminated; 3= active movement against gravity; 4= active movement against moderate 
ORTHO PROGRESS NOTE    2024    Admit Date: 2024  Post Op day: 7 Days Post-Op      Subjective:     Alva Kramer is a patient who is now 7 Days Post-Op  and  is doing well this morning, still has numbness in her arms that she had post operatively  .       Objective:     PT/OT:    Slow progress    Vital Signs:    Patient Vitals for the past 8 hrs:   BP Temp Temp src Pulse Resp SpO2   24 0748 124/78 100.2 °F (37.9 °C) Oral 88 18 94 %       Temp (24hrs), Av.2 °F (37.3 °C), Min:98.2 °F (36.8 °C), Max:100.2 °F (37.9 °C)      LAB:    No results for input(s): \"HGB\", \"WBC\", \"PLT\" in the last 72 hours.      Physical Exam:    Awake and in no acute distress.  Mood and affect appropriate.  Respirations unlabored and no evidence cyanosis.  Calves nontender.  Abdomen soft and nontender.  Dressing changed. Incision intact  Sensory testing:          C5    C6    C7    C8     T1   Right 1 1 1 1 1   Left 1 1 1 1 1      0=absent; 1=altered; 2=normal; NT= not tested  Reflexes     Strength testing in the upper extremity reveals the following based on the 5 point grading scale:       Biceps  (C5) WE  (C6) Tricep (C7) FDP   (C8) Intrinsics   (T1)   Right 4 3 4 3 3   Left 4 3 4 3 3   0=total paralysis; 1=palpable or visible contraction; 2= active movement with gravity eliminated; 3= active movement against gravity; 4= active movement against moderate resistance; 5= active movement against full resistance              Sensory testing:       L2 L3 L4 L5 S1 S2   Right 1 1 1 1 1 1   Left 1 1 1 1 1 1      0=absent; 1=altered; 2=normal; NT= not tested  Reflexes     Strength testing in the lower extremity reveals the following based on the 5 point grading scale:       HF  (L2) KE (L3/4) ADF (L4) EHL (L5) APF (S1)   Right 3 3 4 3 3   Left 3 3 3 3 3   0=total paralysis; 1=palpable or visible contraction; 2= active movement with gravity eliminated; 3= active movement against gravity; 4= active movement against moderate resistance; 
ORTHOPAEDIC PROGRESS NOTE    2024  Admit Date: 2024  Admit Diagnosis: Neck pain [M54.2]  Cervical myelopathy (HCC) [G95.9]  Spinal stenosis of cervical region [M48.02]  Weakness of both lower extremities [R29.898]  Incontinence of feces, unspecified fecal incontinence type [R15.9]  Procedure: Procedure(s):  CERVICAL LAMINECTOMY FUSION POSTERIOR   C3-C6  Post Op day: 1 Day Post-Op      Subjective:     Alva Kramer is a patient who has complaints of numbness bilateral arms. She is moving bilateral upper and lower extremities. Soft collar in place  .       Objective:     Vital Signs:    Blood pressure (!) 144/92, pulse 71, temperature 98.2 °F (36.8 °C), temperature source Oral, resp. rate 18, height 1.702 m (5' 7\"), weight 87.8 kg (193 lb 9 oz), SpO2 92 %.    Temp (24hrs), Av.2 °F (36.8 °C), Min:97.5 °F (36.4 °C), Max:99 °F (37.2 °C)      701 - 1900  In: -   Out: 550 [Urine:550]  1901 -  0700  In: 1300 [I.V.:1300]  Out: 880 [Urine:800; Drains:50]    LAB:    Recent Labs     24  0714   HGB 13.4   WBC 14.6*          Physical Exam    General:   Alert and oriented. No acute distress  Lungs:  Respirations unlabored.  Extremities: No evidence of cyanosis. Calves soft, nontender.    Moves both upper and lower extremities.   Dressing:  clean, dry, and intact drain intact  Neuro:  Improved UE and LE strength, continued numbness BUE      Assessment:      Patient Active Problem List   Diagnosis    Cervical myelopathy (HCC)    Transverse myelitis (HCC)    Multiple sclerosis (HCC)    NPH (normal pressure hydrocephalus) (HCC)    Status post ventriculo-peritoneal shunt placement    Bowel incontinence       Plan:     Continue PT/OT  Postop antibiotics  -Monitor drain output. Drain out Saturday  -Soft collar  -mechanical DVT ppx only, No chemical DVT prophylaxis until cleared by spine  -Regular diet    Anticipate Discharge To:  STR recommended by PT        Signed By: Ivana Simms 
Order received, chart reviewed. Pt is scheduled for cervical laminectomy and fusion today. Will follow up for evaluation post op.    Thank you,    Tasha Sotelo, OT   
Physical Therapy Hold Note    Pt currently on hold for physical therapy due to awaiting c-spine surgery.  Will plan to provide PT evaluation post-operatively.     Robert Molina, PT    
TRANSFER - IN REPORT:    Verbal report received from DAFNE Joseph on Alva Kramer  being received from ED for routine progression of patient care      Report consisted of patient's Situation, Background, Assessment and   Recommendations(SBAR).     Information from the following report(s) Nurse Handoff Report was reviewed with the receiving nurse.    Opportunity for questions and clarification was provided.      Assessment completed upon patient's arrival to unit and care assumed.    
TRANSFER - OUT REPORT:    Verbal report given to Radha LEOS on Alva Kramer  being transferred to [facility]  for routine progression of patient care       Report consisted of patient's Situation, Background, Assessment and   Recommendations(SBAR).     Information from the following report(s) Nurse Handoff Report was reviewed with the receiving nurse.           Lines:       Opportunity for questions and clarification was provided.      Patient transported with:  Tech       
       SUBJECTIVE:   Ms. Kramer states \"I'll go a little  more\"     Social/Functional Lives With: Family  Type of Home: Trailer  Home Layout: One level  Bathroom Shower/Tub: Walk-in shower  Home Equipment: Wheelchair-manual, Cane, Walker, standard  Receives Help From: Family  Active : No  Occupation: On disability  Additional Comments: Lives w/ brothers, independent. Frequent falls. shower chair, RW, WC, walk in shower  OBJECTIVE:     PAIN: VITALS / O2: PRECAUTION / LINES / DRAINS:   Pre Treatment: 0/10  -BUE and LE numbness remains          Post Treatment: 0/10   -BUE and LE numbness remains  Vitals        Oxygen   RA None    RESTRICTIONS/PRECAUTIONS:  Restrictions/Precautions  Restrictions/Precautions: Fall Risk  Position Activity Restriction  Spinal Precautions: No Bending, No Lifting, No Twisting  Restrictions/Precautions: Fall Risk     MOBILITY: I Mod I S SBA CGA Min Mod Max Total  NT x2 Comments:   Bed Mobility    Rolling [] [] [] [] [x] [] [] [] [] [] []    Supine to Sit [] [] [] [] [x] [] [] [] [] [] []    Scooting [] [] [] [] [x] [] [] [] [] [] [] I   Sit to Supine [] [] [] [] [] [] [] [] [] [] []    Transfers    Sit to Stand [] [] [] [] [] [x] [x] [] [] [] []    Bed to Chair [] [] [] [] [] [] [x] [] [] [] []    Stand to Sit [] [] [] [] [] [x] [x] [] [] [] []     [] [] [] [] [] [] [] [] [] [] []    I=Independent, Mod I=Modified Independent, S=Supervision, SBA=Standby Assistance, CGA=Contact Guard Assistance,   Min=Minimal Assistance, Mod=Moderate Assistance, Max=Maximal Assistance, Total=Total Assistance, NT=Not Tested    BALANCE: Good Fair+ Fair Fair- Poor NT Comments   Sitting Static [] [x] [] [] [] []    Sitting Dynamic [] [x] [] [] [] []              Standing Static [] [] [x] [] [] [] RW/gait belt for both   Standing Dynamic [] [] [] [x] [] []      GAIT: I Mod I S SBA CGA Min Mod Max Total  NT x2 Comments:   Level of Assistance [] [] [] [] [] [x] [x] [] [] [] [x] FALL RISK      Distance 3 x 80'  
against full resistance     Assessment:      4 Days Post-Op STATUS POST Procedure(s):  CERVICAL LAMINECTOMY FUSION POSTERIOR   C3-C6      Plan:     -PT/OT  -drain out today   -soft collar at all times. Ok to remove for hygiene and clothing  -stable for discharge from a spine perspective    Anticipate discharge to: SNF      Signed By: Jaquan Reyna MD       
around.  Today's treatment focused on bed mobility while PT assisting patient with hygiene; transfer training from the bed to the recliner couple times with cues for patient to turn all the way around before sitting and gait training with rolling walker with Min A to Mod A.  Pt presents with decreased gait kinematics with ataxic type gait/ incoordinate steps lengths. Pt continues to function below baseline and would continue to benefit from skilled PT to maximize overall functional mobility, independence and safety. Recommending post acute rehab to assist patient to improve all functional mobility.        [facility]™ “6 Clicks” Basic Mobility Inpatient Short Form  AM-PAC Basic Mobility - Inpatient   How much help is needed turning from your back to your side while in a flat bed without using bedrails?: A Little  How much help is needed moving from lying on your back to sitting on the side of a flat bed without using bedrails?: A Lot  How much help is needed moving to and from a bed to a chair?: A Lot  How much help is needed standing up from a chair using your arms?: A Lot  How much help is needed walking in hospital room?: A Little  How much help is needed climbing 3-5 steps with a railing?: A Lot  AM-PAC Inpatient Mobility Raw Score : 14  AM-PAC Inpatient T-Scale Score : 38.1  Mobility Inpatient CMS 0-100% Score: 61.29  Mobility Inpatient CMS G-Code Modifier : CL    SUBJECTIVE:   Ms. Kramer states, \"I need to walk.\"    Social/Functional Lives With: Family  Type of Home: Trailer  Home Layout: One level  Bathroom Shower/Tub: Walk-in shower  Home Equipment: Wheelchair-manual, Cane, Walker, standard  Receives Help From: Family  Active : No  Occupation: On disability  Additional Comments: Lives w/ brothers, independent. Frequent falls. shower chair, RW, WC, walk in shower    OBJECTIVE:     PAIN: VITALS / O2: PRECAUTION / LINES / DRAINS:   Pre Treatment:   Pain Assessment: 0-10  Pain Level: 2  Pain 
fearful. All activity is slow and slow to transition. She required CGA to min a for activity.  Pt returned to bed at end of treatment.         SUBJECTIVE:   Ms. Kramer states \"Do you think I'll get better?\"     Social/Functional Lives With: Family  Type of Home: Trailer  Home Layout: One level  Bathroom Shower/Tub: Walk-in shower  Home Equipment: Wheelchair-manual, Cane, Walker, standard  Receives Help From: Family  Active : No  Occupation: On disability  Additional Comments: Lives w/ brothers, independent. Frequent falls. shower chair, RW, WC, walk in shower  OBJECTIVE:     PAIN: VITALS / O2: PRECAUTION / LINES / DRAINS:   Pre Treatment: 0/10  -BUE numbness remains          Post Treatment: 0/10   -BUE numbness remains  Vitals        Oxygen   RA Hemovac    RESTRICTIONS/PRECAUTIONS:  Restrictions/Precautions  Restrictions/Precautions: Fall Risk  Position Activity Restriction  Spinal Precautions: No Bending, No Lifting, No Twisting  Restrictions/Precautions: Fall Risk     MOBILITY: I Mod I S SBA CGA Min Mod Max Total  NT x2 Comments:   Bed Mobility    Rolling [] [] [] [] [] [x] [] [] [] [] []    Supine to Sit [] [] [] [] [] [x] [] [] [] [] []    Scooting [] [] [] [] [x] [] [] [] [] [] [] Inc time, cueing, rails and cueing for log roll   Sit to Supine [] [] [] [] [] [x] [x] [] [] [] []    Transfers    Sit to Stand [] [] [] [] [x] [x] [] [] [] [] []    Bed to Chair [] [] [] [] [] [] [] [] [] [x] []    Stand to Sit [] [] [] [] [] [x] [] [] [] [] []     [] [] [] [] [] [] [] [] [] [] []    I=Independent, Mod I=Modified Independent, S=Supervision, SBA=Standby Assistance, CGA=Contact Guard Assistance,   Min=Minimal Assistance, Mod=Moderate Assistance, Max=Maximal Assistance, Total=Total Assistance, NT=Not Tested    BALANCE: Good Fair+ Fair Fair- Poor NT Comments   Sitting Static [] [x] [] [] [] []    Sitting Dynamic [] [x] [] [] [] []              Standing Static [] [] [x] [] [] [] RW/gait belt for both   Standing 
patient's neurologist outpt       NPH (normal pressure hydrocephalus) (HCC)    Status post ventriculo-peritoneal shunt placement   Patient is status post  shunt placement for NPH.  Neurosurgery consulted to evaluate  shunt - did not see pt. Discontinued consult.     Anticipated DC: SNF pending- requiring 2 level process due to multiple rehab stays.     PT/OT evals and PPD needed/ordered?  Yes  Diet:  ADULT DIET; Regular  VTE prophylaxis: Lovenox  Code status: Full Code      Non-peripheral Lines and Tubes (if present):      External Urinary Catheter (Active)            Telemetry (if present):           Hospital Problems:  Principal Problem:    Cervical myelopathy (HCC)  Active Problems:    Transverse myelitis (HCC)    Multiple sclerosis (HCC)    NPH (normal pressure hydrocephalus) (HCC)    Status post ventriculo-peritoneal shunt placement    Bowel incontinence  Resolved Problems:    * No resolved hospital problems. *      Objective:   Patient Vitals for the past 24 hrs:   Temp Pulse Resp BP SpO2   04/28/24 0819 -- -- 16 -- --   04/28/24 0712 97.7 °F (36.5 °C) 67 16 105/76 97 %   04/27/24 2134 -- -- 18 -- --   04/27/24 1955 98.2 °F (36.8 °C) 86 18 122/77 95 %   04/27/24 1343 -- -- 20 -- --         Oxygen Therapy  SpO2: 97 %  Pulse via Oximetry: 72 beats per minute  Pulse Oximeter Device Mode: Continuous  Pulse Oximeter Device Location: Left  O2 Device: None (Room air)  O2 Flow Rate (L/min): 6 L/min    Estimated body mass index is 30.32 kg/m² as calculated from the following:    Height as of this encounter: 1.702 m (5' 7\").    Weight as of this encounter: 87.8 kg (193 lb 9 oz).    Intake/Output Summary (Last 24 hours) at 4/28/2024 0875  Last data filed at 4/28/2024 0456  Gross per 24 hour   Intake --   Output 950 ml   Net -950 ml           Physical Exam:     General:          Alert, awake, NAD, on room air  Head:               Normocephalic, atraumatic  Eyes:               Sclerae appear normal.  Pupils equally 
  Coordination []      Tone []     Edema []    Activity Tolerance []      []      COGNITION/  PERCEPTION: Intact Impaired (Comments):   Orientation [x]     Vision [x]     Hearing [x]     Cognition  [x]       MOBILITY: I Mod I S SBA CGA Min Mod Max Total  NT x2 Comments:   Bed Mobility    Rolling [] [] [] [] [] [] [] [] [] [x] []    Supine to Sit [] [] [] [] [] [] [] [] [] [x] []    Scooting [] [] [] [] [] [] [] [] [] [x] []    Sit to Supine [] [] [] [] [] [] [] [] [] [x] []    Transfers    Sit to Stand [] [] [] [] [x] [] [] [] [] [] [] 2 x 5 reps with cues for proper sequencing   Bed to Chair [] [] [] [] [] [] [] [] [] [] []    Stand to Sit [] [] [] [] [x] [] [] [] [] [] [] 2 x 5    [] [] [] [] [] [] [] [] [] [] []    I=Independent, Mod I=Modified Independent, S=Supervision, SBA=Standby Assistance, CGA=Contact Guard Assistance,   Min=Minimal Assistance, Mod=Moderate Assistance, Max=Maximal Assistance, Total=Total Assistance, NT=Not Tested    GAIT: I Mod I S SBA CGA Min Mod Max Total  NT x2 Comments:   Level of Assistance [] [] [] [] [x] [x] [x] [] [] [] [] Chair follow for safety.  2 standing break   Distance 3 x 80 feet with seated rests    DME Gait Belt and Rolling Walker     Gait Quality Ataxic, Decreased tammie , Decreased step clearance, and Decreased step length    Weightbearing Status      Stairs      I=Independent, Mod I=Modified Independent, S=Supervision, SBA=Standby Assistance, CGA=Contact Guard Assistance,   Min=Minimal Assistance, Mod=Moderate Assistance, Max=Maximal Assistance, Total=Total Assistance, NT=Not Tested    PLAN:   FREQUENCY AND DURATION: BID for duration of hospital stay or until stated goals are met, whichever comes first.    THERAPY PROGNOSIS: Good    PROBLEM LIST:   (Skilled intervention is medically necessary to address:)  Decreased Activity Tolerance  Decreased AROM/PROM  Decreased Balance  Decreased Gait Ability  Decreased Safety Awareness  Decreased Strength  Decreased Transfer 
distension.  Lungs:             CTAB.  No wheezing, rhonchi, or rales.  Symmetric expansion.  Abdomen:        Soft, nontender, nondistended.  Extremities:     No cyanosis or clubbing.  No edema  Skin:                No rashes.  Normal coloration.   Warm and dry.    Neuro:             moving upper and lower extremities spontaneously, 5/5 strength and sensation in b/l UE   Psych:             AOx3, anxious    I have personally reviewed labs and tests:  Recent Labs:  Recent Results (from the past 48 hour(s))   Basic Metabolic Panel    Collection Time: 04/21/24  5:28 AM   Result Value Ref Range    Sodium 137 136 - 146 mmol/L    Potassium 5.8 (H) 3.5 - 5.1 mmol/L    Chloride 111 103 - 113 mmol/L    CO2 21 21 - 32 mmol/L    Anion Gap 5 2 - 11 mmol/L    Glucose 80 65 - 100 mg/dL    BUN 15 6 - 23 MG/DL    Creatinine 0.60 0.6 - 1.0 MG/DL    Est, Glom Filt Rate >90 >60 ml/min/1.73m2    Calcium 8.5 8.3 - 10.4 MG/DL   CBC    Collection Time: 04/21/24  5:28 AM   Result Value Ref Range    WBC 10.3 4.3 - 11.1 K/uL    RBC 4.79 4.05 - 5.2 M/uL    Hemoglobin 13.2 11.7 - 15.4 g/dL    Hematocrit 41.8 35.8 - 46.3 %    MCV 87.3 82 - 102 FL    MCH 27.6 26.1 - 32.9 PG    MCHC 31.6 31.4 - 35.0 g/dL    RDW 14.3 11.9 - 14.6 %    Platelets 220 150 - 450 K/uL    MPV 9.3 (L) 9.4 - 12.3 FL    nRBC 0.00 0.0 - 0.2 K/uL   Potassium    Collection Time: 04/21/24  8:25 AM   Result Value Ref Range    Potassium 3.7 3.5 - 5.1 mmol/L       Current Meds:  Current Facility-Administered Medications   Medication Dose Route Frequency    polyethylene glycol (GLYCOLAX) packet 17 g  17 g Oral Daily PRN    methyl salicylate-menthol (ASHLEY SELF GREASELESS) 10-15 % cream CREA   Apply externally TID PRN    diclofenac sodium (VOLTAREN) 1 % gel 4 g  4 g Topical 4x Daily PRN    methocarbamol (ROBAXIN) tablet 750 mg  750 mg Oral Q8H PRN    sodium chloride flush 0.9 % injection 5-40 mL  5-40 mL IntraVENous 2 times per day    sodium chloride flush 0.9 % injection 5-40 mL  5-40 
to Sit [] [] [] [] [] [x] [] [] [] [] []    Scooting [] [] [] [] [] [x] [] [] [] [] []    Sit to Supine [] [] [] [] [] [x] [] [] [] [] []    Transfers    Sit to Stand [] [] [] [] [] [x] [x] [] [] [] [x]    Bed to Chair [] [] [] [] [] [x] [x] [] [] [] [x]    Stand to Sit [] [] [] [] [] [x] [x] [] [] [] [x]    Tub/Shower [] [] [] [] [] [] [] [] [] [] []     Toilet [] [] [] [] [] [x] [] [] [] [] []      [] [] [] [] [] [] [] [] [] [] []    I=Independent, Mod I=Modified Independent, S=Supervision/Setup, SBA=Standby Assistance, CGA=Contact Guard Assistance, Min=Minimal Assistance, Mod=Moderate Assistance, Max=Maximal Assistance, Total=Total Assistance, NT=Not Tested    ACTIVITIES OF DAILY LIVING: I Mod I S SBA CGA Min Mod Max Total NT Comments   BASIC ADLs:              Upper Body   Bathing [] [] [] [] [] [] [] [] [] [x]     Lower Body Bathing [] [] [] [] [] [] [] [] [] [x]     Toileting [] [] [] [] [] [] [] [] [] [x]    Upper Body Dressing [] [] [] [x] [] [] [] [] [] []    Lower Body Dressing [] [] [] [] [] [x] [] [x] [] [] Max a to don pull up   Feeding [] [] [x] [] [] [] [] [] [] []    Grooming [] [] [] [x] [] [x] [] [] [] [] Min a to apply deodorant and SBA with other activities   Personal Device Care [] [] [] [] [] [] [] [] [] [x]    Functional Mobility [] [] [] [] [] [] [x] [x] [] []    I=Independent, Mod I=Modified Independent, S=Supervision/Setup, SBA=Standby Assistance, CGA=Contact Guard Assistance, Min=Minimal Assistance, Mod=Moderate Assistance, Max=Maximal Assistance, Total=Total Assistance, NT=Not Tested    PLAN:     FREQUENCY/DURATION   OT Plan of Care: 3 times/week for duration of hospital stay or until stated goals are met, whichever comes first.    TREATMENT:     TREATMENT:   Co-Treatment PT/OT necessary due to patient's decreased overall endurance/tolerance levels, as well as need for high level skilled assistance to complete functional transfers/mobility and functional tasks  Neuromuscular Re-education 
Alarm Activated, Bed, Bed/Chair Locked, Call light within reach, and Needs within reach    INTERDISCIPLINARY COLLABORATION:  RN/ PCT and PT/ PTA    EDUCATION:  Education Given To: Patient  Education Provided: Role of Therapy;Plan of Care;Precautions;ADL Adaptive Strategies;Transfer Training  Education Outcome: Continued education needed    TOTAL TREATMENT DURATION AND TIME:  Time In: 1335  Time Out: 1358  Minutes: 23    Sandee Burgos, OT             
Awareness  Decreased Strength  Decreased Transfer Abilities INTERVENTIONS PLANNED:   (Benefits and precautions of physical therapy have been discussed with the patient.)  Therapeutic Activity  Therapeutic Exercise/HEP  Neuromuscular Re-education  Gait Training  Education       TREATMENT:   TREATMENT:   Therapeutic Activity (30 Minutes): Therapeutic activity included Rolling, Sit to Supine, Scooting, Transfer Training, Ambulation on level ground, Sitting balance , and Standing balance to improve functional Activity tolerance, Balance, Coordination, Mobility, and Strength.    TREATMENT GRID:  N/A    AFTER TREATMENT PRECAUTIONS: Alarm Activated, Bed, Bed/Chair Locked, Call light within reach, Needs within reach, and RN notified    INTERDISCIPLINARY COLLABORATION:  RN/ PCT and OT/ STEPHEN    EDUCATION:      TIME IN/OUT:  Time In: 0850  Time Out: 0920  Minutes: 30    Robert Molina PT    
SBA=Standby Assistance, CGA=Contact Guard Assistance, Min=Minimal Assistance, Mod=Moderate Assistance, Max=Maximal Assistance, Total=Total Assistance, NT=Not Tested    PLAN:     FREQUENCY/DURATION   OT Plan of Care: 3 times/week for duration of hospital stay or until stated goals are met, whichever comes first.    TREATMENT:     TREATMENT:   Co-Treatment PT/OT necessary due to patient's decreased overall endurance/tolerance levels, as well as need for high level skilled assistance to complete functional transfers/mobility and functional tasks  Neuromuscular Re-education (10 Minutes): Patient participated in neuromuscular re-education including functional reaching, weight shifting, postural training, visual scanning activity, midline training, muscle facilitation of core muscles, fine motor skills training, standing tolerance activity , and sitting balance activity   with moderate and need for assistance, verbal cues, tactile cues, visual cues, education, and adaptive equipment to improve sitting balance, standing balance, awareness of Bilateral visual field , proprioception, posture, coordination, static balance, and dynamic balance in order to prepare for functional task, prepare for seated ADLs, prepare for standing ADLs, prepare for functional transfer, increase safety awareness, and prepare for self care..   Self Care (13 minutes): Patient participated in toileting, upper body dressing, lower body dressing, self feeding, and grooming ADLs in supported sitting, unsupported sitting, and standing with maximal visual, verbal, manual, and tactile cueing to increase independence, decrease assistance required, increase activity tolerance, increase safety awareness, and maintain precautions. Patient also participated in functional mobility, functional transfer, energy conservation, and adaptive equipment training to increase independence, decrease assistance required, increase activity tolerance, increase safety 
Negative NEG /hpf    Casts 0-2 U2 /lpf   Basic Metabolic Panel w/ Reflex to MG    Collection Time: 04/18/24  5:13 AM   Result Value Ref Range    Sodium 142 136 - 146 mmol/L    Potassium 4.1 3.5 - 5.1 mmol/L    Chloride 112 103 - 113 mmol/L    CO2 26 21 - 32 mmol/L    Anion Gap 4 2 - 11 mmol/L    Glucose 137 (H) 65 - 100 mg/dL    BUN 16 6 - 23 MG/DL    Creatinine 0.70 0.6 - 1.0 MG/DL    Est, Glom Filt Rate >90 >60 ml/min/1.73m2    Calcium 9.1 8.3 - 10.4 MG/DL   CBC with Auto Differential    Collection Time: 04/18/24  5:13 AM   Result Value Ref Range    WBC 5.2 4.3 - 11.1 K/uL    RBC 4.66 4.05 - 5.2 M/uL    Hemoglobin 12.8 11.7 - 15.4 g/dL    Hematocrit 39.8 35.8 - 46.3 %    MCV 85.4 82 - 102 FL    MCH 27.5 26.1 - 32.9 PG    MCHC 32.2 31.4 - 35.0 g/dL    RDW 14.3 11.9 - 14.6 %    Platelets 233 150 - 450 K/uL    MPV 9.0 (L) 9.4 - 12.3 FL    nRBC 0.00 0.0 - 0.2 K/uL    Differential Type AUTOMATED      Neutrophils % 89 (H) 43 - 78 %    Lymphocytes % 9 (L) 13 - 44 %    Monocytes % 2 (L) 4.0 - 12.0 %    Eosinophils % 0 (L) 0.5 - 7.8 %    Basophils % 0 0.0 - 2.0 %    Immature Granulocytes % 0 0.0 - 5.0 %    Neutrophils Absolute 4.6 1.7 - 8.2 K/UL    Lymphocytes Absolute 0.5 0.5 - 4.6 K/UL    Monocytes Absolute 0.1 0.1 - 1.3 K/UL    Eosinophils Absolute 0.0 0.0 - 0.8 K/UL    Basophils Absolute 0.0 0.0 - 0.2 K/UL    Immature Granulocytes Absolute 0.0 0.0 - 0.5 K/UL       Current Meds:  Current Facility-Administered Medications   Medication Dose Route Frequency    LORazepam (ATIVAN) 2 mg in sodium chloride (PF) 0.9 % 10 mL injection  2 mg IntraVENous Once PRN    sodium chloride flush 0.9 % injection 5-40 mL  5-40 mL IntraVENous 2 times per day    sodium chloride flush 0.9 % injection 5-40 mL  5-40 mL IntraVENous PRN    0.9 % sodium chloride infusion   IntraVENous PRN    potassium chloride (KLOR-CON M) extended release tablet 40 mEq  40 mEq Oral PRN    Or    potassium bicarb-citric acid (EFFER-K) effervescent tablet 40 mEq  40 
5-40 mL IntraVENous 2 times per day    sodium chloride flush 0.9 % injection 5-40 mL  5-40 mL IntraVENous PRN    0.9 % sodium chloride infusion   IntraVENous PRN    potassium chloride (KLOR-CON M) extended release tablet 40 mEq  40 mEq Oral PRN    Or    potassium bicarb-citric acid (EFFER-K) effervescent tablet 40 mEq  40 mEq Oral PRN    Or    potassium chloride 10 mEq/100 mL IVPB (Peripheral Line)  10 mEq IntraVENous PRN    magnesium sulfate 2000 mg in 50 mL IVPB premix  2,000 mg IntraVENous PRN    polyethylene glycol (GLYCOLAX) packet 17 g  17 g Oral Daily PRN    acetaminophen (TYLENOL) tablet 650 mg  650 mg Oral Q4H PRN    Or    acetaminophen (TYLENOL) suppository 650 mg  650 mg Rectal Q6H PRN    melatonin tablet 3 mg  3 mg Oral Nightly    [Held by provider] sennosides-docusate sodium (SENOKOT-S) 8.6-50 MG tablet 1 tablet  1 tablet Oral BID    promethazine (PHENERGAN) tablet 12.5 mg  12.5 mg Oral Q6H PRN    Or    ondansetron (ZOFRAN) injection 4 mg  4 mg IntraVENous Q4H PRN    famotidine (PEPCID) tablet 20 mg  20 mg Oral BID    gabapentin (NEURONTIN) capsule 300 mg  300 mg Oral TID    oxyBUTYnin (DITROPAN-XL) extended release tablet 10 mg  10 mg Oral Nightly    FLUoxetine (PROZAC) capsule 20 mg  20 mg Oral Daily    traZODone (DESYREL) tablet 150 mg  150 mg Oral Nightly    dexAMETHasone (DECADRON) injection 8 mg  8 mg IntraVENous Q8H    HYDROcodone-acetaminophen (NORCO) 7.5-325 MG per tablet 1 tablet  1 tablet Oral Q4H PRN    tuberculin injection 5 Units  5 Units IntraDERmal Once       Signed:  Cuca Garcia MD    Part of this note may have been written by using a voice dictation software.  The note has been proof read but may still contain some grammatical/other typographical errors.

## 2024-04-30 NOTE — DISCHARGE SUMMARY
Hospitalist Discharge Summary   Admit Date:  2024 10:15 AM   DC Note date: 2024  Name:  Alva Kramer   Age:  54 y.o.  Sex:  female  :  1970   MRN:  [id number]   Room:  [facility]  PCP:  Not, On File (Inactive)    Presenting Complaint: Incontinence and Fatigue     Initial Admission Diagnosis: Neck pain [M54.2]  Cervical myelopathy (HCC) [G95.9]  Spinal stenosis of cervical region [M48.02]  Weakness of both lower extremities [R29.898]  Incontinence of feces, unspecified fecal incontinence type [R15.9]     Problem List for this Hospitalization (present on admission):    Principal Problem:    Cervical myelopathy (HCC)  Active Problems:    Transverse myelitis (HCC)    Multiple sclerosis (HCC)    NPH (normal pressure hydrocephalus) (HCC)    Status post ventriculo-peritoneal shunt placement    Bowel incontinence  Resolved Problems:    * No resolved hospital problems. *      Hospital Course:  Alva Kramer is a 54 y.o. female with medical history of medical history of breast cancer, cervical myelopathy (C3 C6), transverse myelitis, multiple sclerosis on Ocrevus, NPH status post  shunt admitted on 2024 for cervical myelopathy.  Who presented with chief complaints of worsening pain in her neck, bilateral upper and lower extremity weakness and continue bowel incontinence. Was seen by neurosurgery at [facility] on 3/26/23 with plans for C5-c6 ACDF. Was just discharged from [facility] a few days ago.       Vitals and labs stable in ER.  Cervial MRI showed spinal cord signal abnormality in C3 to C6 and ortho spine was consulted.  S/p c3-c6 laminectomy and c3-c7 instrumented fusion on .     Principal Problem:  Cervical myelopathy (HCC)  Bowel incontinence  MRI spine findings noted   C3-C6 laminectomy and a C3-C6 instrumented fusion   POD 11  Drain taken out on .  PT OT eval  PPD completed  Bowel regimen  Topical agents added for arm numbness  SNF approved  No new recommendation from orthospine.       Transverse

## 2024-04-30 NOTE — CARE COORDINATION
Pt is medically cleared for dc today and will transfer to room 8 at [facility] for STR services.  RN to call report to # [phone].  Transport scheduled for 1530 through [employer]  CM notified pt of her dc and transport time.  She will notify her family.  CM remains available to assist as needed.       04/30/24 1450   Service Assessment   Patient Orientation Alert and Oriented   Cognition Alert   History Provided By Patient;Medical Record   Primary Caregiver Self   Accompanied By/Relationship n/a   Support Systems Children;Family Members;[place] Community;Friends/Neighbors   Patient's Healthcare Decision Maker is: Legal Next of Kin   PCP Verified by CM Yes   Last Visit to PCP Within last two years   Prior Functional Level Assistance with the following:;Mobility   Current Functional Level Assistance with the following:;Mobility   Can patient return to prior living arrangement No   Ability to make needs known: Good   Family able to assist with home care needs: No   Would you like for me to discuss the discharge plan with any other family members/significant others, and if so, who? Yes  (dtr)   Financial Resources Medicaid   Community Resources None   Social/Functional History   Lives With Family   Type of Home Trailer   Home Layout One level   Bathroom Shower/Tub Walk-in shower   Home Equipment Wheelchair-manual;Cane;Walker, standard   Receives Help From Family   ADL Assistance Independent   Homemaking Assistance Needs assistance   Ambulation Assistance Needs assistance   Transfer Assistance Needs assistance   Active  No   Occupation On disability   Discharge Planning   Type of Residence Skilled Nursing Facility   Living Arrangements Family Members   Current Services Prior To Admission Durable Medical Equipment   Current DME Prior to Arrival Walker;Wheelchair;Cane   Potential Assistance Needed Skilled Nursing Facility   DME Ordered? No   Potential Assistance Purchasing Medications No   Type of

## 2024-04-30 NOTE — PLAN OF CARE
Problem: Discharge Planning  Goal: Discharge to home or other facility with appropriate resources  4/19/2024 2031 by Nina Mendez GN  Outcome: Progressing  Flowsheets (Taken 4/19/2024 1911)  Discharge to home or other facility with appropriate resources:   Identify barriers to discharge with patient and caregiver   Identify discharge learning needs (meds, wound care, etc)   Arrange for needed discharge resources and transportation as appropriate  4/19/2024 1456 by Kina Strange RN  Outcome: Progressing     Problem: Pain  Goal: Verbalizes/displays adequate comfort level or baseline comfort level  4/19/2024 2031 by Nina Mendez GN  Outcome: Progressing  4/19/2024 1456 by Kina Strange RN  Outcome: Progressing     Problem: Skin/Tissue Integrity  Goal: Absence of new skin breakdown  Description: 1.  Monitor for areas of redness and/or skin breakdown  2.  Assess vascular access sites hourly  3.  Every 4-6 hours minimum:  Change oxygen saturation probe site  4.  Every 4-6 hours:  If on nasal continuous positive airway pressure, respiratory therapy assess nares and determine need for appliance change or resting period.  4/19/2024 2031 by Nina Mendez GN  Outcome: Progressing  4/19/2024 1456 by Kina Strange RN  Outcome: Progressing     Problem: Safety - Adult  Goal: Free from fall injury  4/19/2024 2031 by Nina Mendez GN  Outcome: Progressing  Flowsheets (Taken 4/19/2024 1911)  Free From Fall Injury: Instruct family/caregiver on patient safety  4/19/2024 1456 by Kina Strange RN  Outcome: Progressing     Problem: ABCDS Injury Assessment  Goal: Absence of physical injury  4/19/2024 2031 by Nina Mendez GN  Outcome: Progressing  4/19/2024 1456 by Kina Strange RN  Outcome: Progressing     
  Problem: Discharge Planning  Goal: Discharge to home or other facility with appropriate resources  4/22/2024 2206 by Rizwana Hidalgo RN  Outcome: Progressing  4/22/2024 1103 by Yeimi Sherwood RN  Outcome: Progressing  Flowsheets (Taken 4/22/2024 0863)  Discharge to home or other facility with appropriate resources: Identify barriers to discharge with patient and caregiver     Problem: Pain  Goal: Verbalizes/displays adequate comfort level or baseline comfort level  4/22/2024 2206 by Rizwana Hidalgo RN  Outcome: Progressing  4/22/2024 1103 by Yeimi Sherwood RN  Outcome: Progressing     Problem: Skin/Tissue Integrity  Goal: Absence of new skin breakdown  Description: 1.  Monitor for areas of redness and/or skin breakdown  2.  Assess vascular access sites hourly  3.  Every 4-6 hours minimum:  Change oxygen saturation probe site  4.  Every 4-6 hours:  If on nasal continuous positive airway pressure, respiratory therapy assess nares and determine need for appliance change or resting period.  4/22/2024 2206 by Rizwana Hidalgo RN  Outcome: Progressing  4/22/2024 1103 by Yeimi Sherwood RN  Outcome: Progressing     Problem: Safety - Adult  Goal: Free from fall injury  4/22/2024 2206 by Rizwana Hidalgo RN  Outcome: Progressing  4/22/2024 1103 by Yeimi Sherwood RN  Outcome: Progressing     Problem: ABCDS Injury Assessment  Goal: Absence of physical injury  4/22/2024 2206 by Rizwana Hidalgo RN  Outcome: Progressing  4/22/2024 1103 by Yeimi Sherwood RN  Outcome: Progressing     
  Problem: Discharge Planning  Goal: Discharge to home or other facility with appropriate resources  4/25/2024 0103 by Mitra Morrow RN  Outcome: Progressing  4/24/2024 1139 by Yeimi Sherwood RN  Outcome: Progressing  Flowsheets (Taken 4/24/2024 0845)  Discharge to home or other facility with appropriate resources: Identify barriers to discharge with patient and caregiver     Problem: Pain  Goal: Verbalizes/displays adequate comfort level or baseline comfort level  4/25/2024 0103 by Mitra Morrow RN  Outcome: Progressing  4/24/2024 1139 by Yeimi Sherwood RN  Outcome: Progressing     Problem: Skin/Tissue Integrity  Goal: Absence of new skin breakdown  4/25/2024 0103 by Mitra Morrow RN  Outcome: Progressing  4/24/2024 1139 by Yeimi Sherwood RN  Outcome: Progressing     Problem: Safety - Adult  Goal: Free from fall injury  4/25/2024 0103 by Mitra Morrow RN  Outcome: Progressing  4/24/2024 1139 by Yeimi Sherwood RN  Outcome: Progressing     Problem: ABCDS Injury Assessment  Goal: Absence of physical injury  4/25/2024 0103 by Mitra Morrow RN  Outcome: Progressing  4/24/2024 1139 by Yeimi Sherwood RN  Outcome: Progressing     
  Problem: Discharge Planning  Goal: Discharge to home or other facility with appropriate resources  4/26/2024 1952 by Domi Garcia RN  Outcome: Progressing  4/26/2024 0957 by Stephenie Olivares RN  Outcome: Progressing     Problem: Pain  Goal: Verbalizes/displays adequate comfort level or baseline comfort level  4/26/2024 1952 by Domi Garcia RN  Outcome: Progressing  4/26/2024 0957 by Stephenie Olivares RN  Outcome: Progressing     Problem: Skin/Tissue Integrity  Goal: Absence of new skin breakdown  Description: 1.  Monitor for areas of redness and/or skin breakdown  2.  Assess vascular access sites hourly  3.  Every 4-6 hours minimum:  Change oxygen saturation probe site  4.  Every 4-6 hours:  If on nasal continuous positive airway pressure, respiratory therapy assess nares and determine need for appliance change or resting period.  4/26/2024 1952 by Domi Garcia RN  Outcome: Progressing  4/26/2024 0957 by Stephenie Olivares RN  Outcome: Progressing     Problem: Safety - Adult  Goal: Free from fall injury  4/26/2024 1952 by Domi Garcia RN  Outcome: Progressing  4/26/2024 0957 by Stephenie Olivares RN  Outcome: Progressing     Problem: ABCDS Injury Assessment  Goal: Absence of physical injury  4/26/2024 1952 by Domi Garcia RN  Outcome: Progressing  4/26/2024 0957 by Stephenie Olivares RN  Outcome: Progressing     
  Problem: Discharge Planning  Goal: Discharge to home or other facility with appropriate resources  4/30/2024 0922 by Jia Hanson RN  Outcome: Progressing  Flowsheets (Taken 4/30/2024 0732)  Discharge to home or other facility with appropriate resources: Identify barriers to discharge with patient and caregiver  4/30/2024 0010 by Natasha Perdomo RN  Outcome: Progressing     Problem: Pain  Goal: Verbalizes/displays adequate comfort level or baseline comfort level  4/30/2024 0922 by Jia Hanson RN  Outcome: Progressing  4/30/2024 0010 by Natasha Perdomo RN  Outcome: Progressing     Problem: Skin/Tissue Integrity  Goal: Absence of new skin breakdown  Description: 1.  Monitor for areas of redness and/or skin breakdown  2.  Assess vascular access sites hourly  3.  Every 4-6 hours minimum:  Change oxygen saturation probe site  4.  Every 4-6 hours:  If on nasal continuous positive airway pressure, respiratory therapy assess nares and determine need for appliance change or resting period.  4/30/2024 0922 by Jia Hanson RN  Outcome: Progressing  4/30/2024 0010 by Natasha Perdomo RN  Outcome: Progressing     Problem: Safety - Adult  Goal: Free from fall injury  4/30/2024 0922 by Jia Hanson RN  Outcome: Progressing  Flowsheets (Taken 4/30/2024 0732)  Free From Fall Injury: Instruct family/caregiver on patient safety  4/30/2024 0010 by Natasha Perdomo RN  Outcome: Progressing     Problem: ABCDS Injury Assessment  Goal: Absence of physical injury  4/30/2024 0922 by Jia Hanson RN  Outcome: Progressing  Flowsheets (Taken 4/30/2024 0732)  Absence of Physical Injury: Implement safety measures based on patient assessment  4/30/2024 0010 by Natasha Perdomo RN  Outcome: Progressing     
  Problem: Discharge Planning  Goal: Discharge to home or other facility with appropriate resources  Outcome: Progressing     Problem: Pain  Goal: Verbalizes/displays adequate comfort level or baseline comfort level  Outcome: Progressing     Problem: Skin/Tissue Integrity  Goal: Absence of new skin breakdown  Description: 1.  Monitor for areas of redness and/or skin breakdown  2.  Assess vascular access sites hourly  3.  Every 4-6 hours minimum:  Change oxygen saturation probe site  4.  Every 4-6 hours:  If on nasal continuous positive airway pressure, respiratory therapy assess nares and determine need for appliance change or resting period.  Outcome: Progressing     Problem: Safety - Adult  Goal: Free from fall injury  Outcome: Progressing     
  Problem: Discharge Planning  Goal: Discharge to home or other facility with appropriate resources  Outcome: Progressing     Problem: Pain  Goal: Verbalizes/displays adequate comfort level or baseline comfort level  Outcome: Progressing  Flowsheets (Taken 4/20/2024 1925)  Verbalizes/displays adequate comfort level or baseline comfort level:   Assess pain using appropriate pain scale   Encourage patient to monitor pain and request assistance   Administer analgesics based on type and severity of pain and evaluate response   Implement non-pharmacological measures as appropriate and evaluate response   Consider cultural and social influences on pain and pain management     Problem: Skin/Tissue Integrity  Goal: Absence of new skin breakdown  Description: 1.  Monitor for areas of redness and/or skin breakdown  2.  Assess vascular access sites hourly  3.  Every 4-6 hours minimum:  Change oxygen saturation probe site  4.  Every 4-6 hours:  If on nasal continuous positive airway pressure, respiratory therapy assess nares and determine need for appliance change or resting period.  Outcome: Progressing     Problem: Safety - Adult  Goal: Free from fall injury  Outcome: Progressing  Flowsheets (Taken 4/20/2024 1925)  Free From Fall Injury: Instruct family/caregiver on patient safety     Problem: ABCDS Injury Assessment  Goal: Absence of physical injury  Outcome: Progressing     
  Problem: Discharge Planning  Goal: Discharge to home or other facility with appropriate resources  Outcome: Progressing  Flowsheets (Taken 4/25/2024 1920)  Discharge to home or other facility with appropriate resources: Identify barriers to discharge with patient and caregiver     Problem: Pain  Goal: Verbalizes/displays adequate comfort level or baseline comfort level  Outcome: Progressing  Flowsheets (Taken 4/25/2024 1920)  Verbalizes/displays adequate comfort level or baseline comfort level: Encourage patient to monitor pain and request assistance     Problem: Skin/Tissue Integrity  Goal: Absence of new skin breakdown  Description: 1.  Monitor for areas of redness and/or skin breakdown  2.  Assess vascular access sites hourly  3.  Every 4-6 hours minimum:  Change oxygen saturation probe site  4.  Every 4-6 hours:  If on nasal continuous positive airway pressure, respiratory therapy assess nares and determine need for appliance change or resting period.  Outcome: Progressing     Problem: Safety - Adult  Goal: Free from fall injury  Outcome: Progressing  Flowsheets (Taken 4/25/2024 1920)  Free From Fall Injury: Instruct family/caregiver on patient safety     Problem: ABCDS Injury Assessment  Goal: Absence of physical injury  Outcome: Progressing  Flowsheets (Taken 4/25/2024 1920)  Absence of Physical Injury: Implement safety measures based on patient assessment     
  Problem: Discharge Planning  Goal: Discharge to home or other facility with appropriate resources  Outcome: Progressing  Flowsheets (Taken 4/29/2024 0751)  Discharge to home or other facility with appropriate resources: Identify barriers to discharge with patient and caregiver     Problem: Pain  Goal: Verbalizes/displays adequate comfort level or baseline comfort level  Outcome: Progressing     Problem: Skin/Tissue Integrity  Goal: Absence of new skin breakdown  Description: 1.  Monitor for areas of redness and/or skin breakdown  2.  Assess vascular access sites hourly  3.  Every 4-6 hours minimum:  Change oxygen saturation probe site  4.  Every 4-6 hours:  If on nasal continuous positive airway pressure, respiratory therapy assess nares and determine need for appliance change or resting period.  Outcome: Progressing     Problem: Safety - Adult  Goal: Free from fall injury  Outcome: Progressing  Flowsheets (Taken 4/29/2024 0751)  Free From Fall Injury: Instruct family/caregiver on patient safety     Problem: ABCDS Injury Assessment  Goal: Absence of physical injury  Outcome: Progressing  Flowsheets (Taken 4/29/2024 0751)  Absence of Physical Injury: Implement safety measures based on patient assessment     
  Problem: Pain  Goal: Verbalizes/displays adequate comfort level or baseline comfort level  4/18/2024 2132 by Subha Hayes RN  Outcome: Progressing  4/18/2024 1302 by Soila Blair RN  Outcome: Progressing     Problem: Skin/Tissue Integrity  Goal: Absence of new skin breakdown  Description: 1.  Monitor for areas of redness and/or skin breakdown  2.  Assess vascular access sites hourly  3.  Every 4-6 hours minimum:  Change oxygen saturation probe site  4.  Every 4-6 hours:  If on nasal continuous positive airway pressure, respiratory therapy assess nares and determine need for appliance change or resting period.  4/18/2024 2132 by Subha Hayes RN  Outcome: Progressing  4/18/2024 1302 by Soila Blair RN  Outcome: Progressing     Problem: Safety - Adult  Goal: Free from fall injury  4/18/2024 2132 by Subha Hayes RN  Outcome: Progressing  4/18/2024 1302 by Soila Blair RN  Outcome: Progressing     Problem: ABCDS Injury Assessment  Goal: Absence of physical injury  Outcome: Progressing     Problem: Discharge Planning  Goal: Discharge to home or other facility with appropriate resources  4/18/2024 1302 by Soila Blair RN  Outcome: Progressing     
(Taken 4/21/2024 1921)  Free From Fall Injury: Instruct family/caregiver on patient safety  4/21/2024 1109 by Latonia Woodall, RN  Outcome: Progressing     Problem: ABCDS Injury Assessment  Goal: Absence of physical injury  4/21/2024 2346 by Nina Mendez GN  Outcome: Progressing  Flowsheets (Taken 4/21/2024 1921)  Absence of Physical Injury: Implement safety measures based on patient assessment  4/21/2024 1109 by Latonia Woodall, RN  Outcome: Progressing

## 2024-05-02 ENCOUNTER — TELEPHONE (OUTPATIENT)
Dept: ORTHOPEDIC SURGERY | Age: 54
End: 2024-05-02

## 2024-05-10 ENCOUNTER — OFFICE VISIT (OUTPATIENT)
Age: 54
End: 2024-05-10

## 2024-05-10 DIAGNOSIS — Z98.890 S/P LAMINECTOMY: Primary | ICD-10-CM

## 2024-05-10 NOTE — PROGRESS NOTES
Name: Alva Kramer  YOB: 1970  Gender: female  MRN: [id number]  Age: 54 y.o.    Chief Complaint: 3 follow-up  History of present illness:    This is a very pleasant 54 y.o. female who presents status post C3-C6 laminectomy and fusion on 4/18/2024.  This was done for progressive myelopathy.  States her neck pain is better.  She still continues to have arm numbness which she had preoperatively.  Overall she is happy.  She has been tolerating her soft collar well.  She is currently in rehab.      Medications:     Prior to Visit Medications    Medication Sig Taking? Authorizing Provider   gabapentin (NEURONTIN) 300 MG capsule Take 1 capsule by mouth 3 times daily for 30 days.  Golden Reed MD   diclofenac sodium (VOLTAREN) 1 % GEL Apply 4 g topically 4 times daily as needed for Pain  Golden Reed MD   methyl salicylate-menthol (ASHLEY SELF GREASELESS) 10-15 % CREA Apply topically 3 times daily as needed for Pain  Golden Reed MD   sennosides-docusate sodium (SENOKOT-S) 8.6-50 MG tablet Take 1 tablet by mouth daily as needed for Constipation  Golden Reed MD   LORazepam (ATIVAN) 0.5 MG tablet Take 1 tablet by mouth 2 times daily as needed for Anxiety for up to 10 days. Max Daily Amount: 1 mg  Golden Reed MD   traZODone (DESYREL) 150 MG tablet Take 1 tablet by mouth nightly  Vero Catherine MD   acetaminophen (TYLENOL) 325 MG tablet Take 2 tablets by mouth every 6 hours as needed for Pain  Vero Catherine MD   famotidine (PEPCID) 20 MG tablet Take 1 tablet by mouth 2 times daily  Vero Catherine MD   melatonin 3 MG TABS tablet Take 5 mg by mouth nightly as needed  Vero Catherine MD   oxyBUTYnin (DITROPAN-XL) 10 MG extended release tablet Take 1 tablet by mouth daily  Vero Catherine MD   FLUoxetine (PROZAC) 20 MG capsule Take 1 capsule by mouth daily  Vero Catherine MD       Allergies:     Allergies   Allergen Reactions    Adhesive Tape Rash

## 2024-06-07 ENCOUNTER — OFFICE VISIT (OUTPATIENT)
Age: 54
End: 2024-06-07

## 2024-06-07 DIAGNOSIS — Z98.890 S/P LAMINECTOMY: Primary | ICD-10-CM

## 2024-07-19 ENCOUNTER — TELEPHONE (OUTPATIENT)
Dept: ORTHOPEDIC SURGERY | Age: 54
End: 2024-07-19

## 2024-07-19 NOTE — TELEPHONE ENCOUNTER
She did a social work assessment and would like a follow up call with the patient in a couple of weeks. She would like a verbal order.

## 2024-07-26 ENCOUNTER — OFFICE VISIT (OUTPATIENT)
Age: 54
End: 2024-07-26

## 2024-07-26 DIAGNOSIS — Z98.890 S/P LAMINECTOMY: ICD-10-CM

## 2024-07-26 DIAGNOSIS — Z98.1 S/P CERVICAL SPINAL FUSION: Primary | ICD-10-CM

## 2024-07-31 ENCOUNTER — TELEPHONE (OUTPATIENT)
Dept: ORTHOPEDIC SURGERY | Age: 54
End: 2024-07-31

## 2024-07-31 NOTE — TELEPHONE ENCOUNTER
She wants to continue home health PT 1 x week x 4 weeks. She wants to add OT because patient fell getting out of the shower a couple of weeks ago. Please give her a call with a verbal okay.   Subjective   Patient ID: Justin is a 3 year old male.    Chief Complaint   Patient presents with   • Ear Pain     Patient has had right ear pain and sore throat today. Patient was exposed to RSV yesterday.      Mother of patient states that child has been having right ear pain and a sore throat today.  Mother states patient was exposed to RSV yesterday.        History reviewed. No pertinent past medical history.    MEDICATIONS:  Current Outpatient Medications   Medication Sig   • amoxicillin (AMOXIL) 400 MG/5ML suspension Take 10 mLs by mouth in the morning and 10 mLs in the evening. Do all this for 10 days.     No current facility-administered medications for this visit.       ALLERGIES:  ALLERGIES:  No Known Allergies    PAST SURGICAL HISTORY:  History reviewed. No pertinent surgical history.    FAMILY HISTORY:  History reviewed. No pertinent family history.    SOCIAL HISTORY:         Patient's medications, allergies, past medical, surgical, and social history  were reviewed and updated as appropriate.    Review of Systems   HENT: Positive for congestion, ear pain and sore throat.    Respiratory: Positive for cough.    All other systems reviewed and are negative.      Objective   Physical Exam  Vitals and nursing note reviewed.   Constitutional:       General: He is active.      Appearance: Normal appearance. He is well-developed and normal weight.   HENT:      Head: Normocephalic and atraumatic.      Right Ear: Ear canal and external ear normal. Tympanic membrane is erythematous.      Left Ear: Tympanic membrane, ear canal and external ear normal.      Nose: Congestion present.      Mouth/Throat:      Mouth: Mucous membranes are moist.      Pharynx: Oropharynx is clear. Posterior oropharyngeal erythema present.      Neck: Normal range of motion and neck supple.   Eyes:      Extraocular Movements: Extraocular movements intact.      Conjunctiva/sclera: Conjunctivae normal.      Pupils: Pupils are equal, round, and  reactive to light.   Cardiovascular:      Rate and Rhythm: Normal rate and regular rhythm.      Pulses: Normal pulses.      Heart sounds: Normal heart sounds.   Pulmonary:      Effort: Pulmonary effort is normal.      Breath sounds: Normal breath sounds.   Abdominal:      General: Abdomen is flat.      Palpations: Abdomen is soft.   Musculoskeletal:         General: Normal range of motion.   Skin:     General: Skin is warm and dry.      Capillary Refill: Capillary refill takes less than 2 seconds.   Neurological:      General: No focal deficit present.      Mental Status: He is alert and oriented for age.           Medical decision making:  Multiple differential diagnoses were considered. The patient was apprised of diagnostic and treatment options including alternate modes of care, in addition to risks and benefits, for this medical condition. Based on this discussion the patient agrees with this chosen diagnostic and treatment plan.         Vitals:    10/24/22 1855 10/24/22 1937   Pulse: (!) 138    Resp: 24    Temp: 100 °F (37.8 °C) 97.2 °F (36.2 °C)   TempSrc: Temporal    SpO2: 99%    Weight: (!) 26.8 kg (58 lb 15.6 oz)         Assessment     Problem List Items Addressed This Visit    None     Visit Diagnoses     Right otitis media, unspecified otitis media type    -  Primary    Relevant Medications    ibuprofen (CHILDRENS ADVIL) 100 MG/5ML suspension 260 mg (Completed)    amoxicillin (AMOXIL) 400 MG/5ML suspension    Sore throat        Relevant Orders    POCT RAPID STREP A (Completed)    STREPTOCOCCUS GROUP A (STREPTOCOCCUS PYOGENES), BACTERIAL CULTURE (Completed)           Diagnoses that have been ruled out:   None   Diagnoses that are still under consideration:   None   Final diagnoses:   1. Right otitis media, unspecified otitis media type    2. Sore throat        Treatment:  Results for orders placed or performed in visit on 10/24/22   POCT RAPID STREP A   Result Value Ref Range    GRP A STREP Negative  Negative    Internal Procedural Controls Acceptable Yes     TEST LOT NUMBER [id number]     TEST LOT EXPIRATION DATE 09.30.2023    STREPTOCOCCUS GROUP A (STREPTOCOCCUS PYOGENES), BACTERIAL CULTURE    Specimen: Throat   Result Value Ref Range    CULTURE, STREPTOCOCCUS GROUP A (STREPTOCOCCUS PYOGENES)       Preliminary report: No Beta Strep at 1 day. Plates reincubated.    Ibuprofen  Plan:  Increase fluids  Rest  Children's Tylenol every 4 hours as needed for pain or fever over 100.5  Children's ibuprofen every 6 hours needed for pain or fever over 100.5.  Take with food  Take the antibiotic until all gone  You can  Debrox and use that to the right ear or both ears after his right ear has healed  Strep test is negative today and we will send the swab to culture and call you in 2 days if it is positive        Instructions provided as documented in the AVS.    Thank you for visiting [facility] ([place]).

## 2024-10-29 ENCOUNTER — OFFICE VISIT (OUTPATIENT)
Age: 54
End: 2024-10-29
Payer: MEDICAID

## 2024-10-29 DIAGNOSIS — Z98.1 S/P CERVICAL SPINAL FUSION: Primary | ICD-10-CM

## 2024-10-29 PROCEDURE — 99213 OFFICE O/P EST LOW 20 MIN: CPT | Performed by: ORTHOPAEDIC SURGERY

## 2024-10-30 NOTE — PROGRESS NOTES
Name: Alva Kramer  YOB: 1970  Gender: female  MRN: [id number]  Age: 54 y.o.    Chief Complaint: 6-month follow-up  History of present illness:    This is a very pleasant 54 y.o. female who presents with after a C3-C6 laminectomy and fusion done for cervical myelopathy.  She states that she continues to make improvements.  She continues to use her walker to ambulate around her house.  She reports that currently she is having a flareup of her multiple sclerosis.        Medications:     Prior to Visit Medications    Medication Sig Taking? Authorizing Provider   gabapentin (NEURONTIN) 300 MG capsule Take 1 capsule by mouth 3 times daily for 30 days.  Golden Reed MD   diclofenac sodium (VOLTAREN) 1 % GEL Apply 4 g topically 4 times daily as needed for Pain  Golden Reed MD   methyl salicylate-menthol (ASHLEY SELF GREASELESS) 10-15 % CREA Apply topically 3 times daily as needed for Pain  Golden Reed MD   sennosides-docusate sodium (SENOKOT-S) 8.6-50 MG tablet Take 1 tablet by mouth daily as needed for Constipation  Golden Reed MD   traZODone (DESYREL) 150 MG tablet Take 1 tablet by mouth nightly  Vero Catherine MD   acetaminophen (TYLENOL) 325 MG tablet Take 2 tablets by mouth every 6 hours as needed for Pain  Vero Catherine MD   famotidine (PEPCID) 20 MG tablet Take 1 tablet by mouth 2 times daily  Vero Catherine MD   melatonin 3 MG TABS tablet Take 5 mg by mouth nightly as needed  Vero Catherine MD   oxyBUTYnin (DITROPAN-XL) 10 MG extended release tablet Take 1 tablet by mouth daily  Vero Catherine MD   FLUoxetine (PROZAC) 20 MG capsule Take 1 capsule by mouth daily  Vero Catherine MD       Allergies:     Allergies   Allergen Reactions    Adhesive Tape Rash     Other reaction(s): Rash-Allergy        Physical Exam:     This is a well developed well nourished adult female in no acute distress.     Oriented to person, place and time.    Mood and

## 2025-01-04 ENCOUNTER — HOSPITAL ENCOUNTER (EMERGENCY)
Age: 55
Discharge: HOME | End: 2025-01-04
Attending: EMERGENCY MEDICINE
Payer: MEDICAID

## 2025-01-04 ENCOUNTER — APPOINTMENT (OUTPATIENT)
Dept: CT IMAGING | Age: 55
End: 2025-01-04
Payer: MEDICAID

## 2025-01-04 ENCOUNTER — APPOINTMENT (OUTPATIENT)
Dept: GENERAL RADIOLOGY | Age: 55
End: 2025-01-04
Payer: MEDICAID

## 2025-01-04 VITALS
OXYGEN SATURATION: 98 % | RESPIRATION RATE: 18 BRPM | DIASTOLIC BLOOD PRESSURE: 88 MMHG | TEMPERATURE: 98.1 F | HEART RATE: 110 BPM | SYSTOLIC BLOOD PRESSURE: 149 MMHG | WEIGHT: 180 LBS | BODY MASS INDEX: 28.25 KG/M2 | HEIGHT: 67 IN

## 2025-01-04 DIAGNOSIS — R25.1 TREMOR: Primary | ICD-10-CM

## 2025-01-04 LAB
ALBUMIN SERPL-MCNC: 3.8 G/DL (ref 3.5–5)
ALBUMIN/GLOB SERPL: 1.4 (ref 1–1.9)
ALP SERPL-CCNC: 77 U/L (ref 35–104)
ALT SERPL-CCNC: 15 U/L (ref 8–45)
ANION GAP SERPL CALC-SCNC: 11 MMOL/L (ref 7–16)
AST SERPL-CCNC: 12 U/L (ref 15–37)
BASOPHILS # BLD: 0 K/UL (ref 0–0.2)
BASOPHILS NFR BLD: 0 % (ref 0–2)
BILIRUB SERPL-MCNC: 0.6 MG/DL (ref 0–1.2)
BUN SERPL-MCNC: 12 MG/DL (ref 6–23)
CALCIUM SERPL-MCNC: 9.6 MG/DL (ref 8.8–10.2)
CHLORIDE SERPL-SCNC: 106 MMOL/L (ref 98–107)
CO2 SERPL-SCNC: 24 MMOL/L (ref 20–29)
CREAT SERPL-MCNC: 0.82 MG/DL (ref 0.6–1.1)
DIFFERENTIAL METHOD BLD: ABNORMAL
EOSINOPHIL # BLD: 0 K/UL (ref 0–0.8)
EOSINOPHIL NFR BLD: 0 % (ref 0.5–7.8)
ERYTHROCYTE [DISTWIDTH] IN BLOOD BY AUTOMATED COUNT: 13.1 % (ref 11.9–14.6)
GLOBULIN SER CALC-MCNC: 2.8 G/DL (ref 2.3–3.5)
GLUCOSE SERPL-MCNC: 96 MG/DL (ref 70–99)
HCT VFR BLD AUTO: 43.3 % (ref 35.8–46.3)
HGB BLD-MCNC: 14.4 G/DL (ref 11.7–15.4)
IMM GRANULOCYTES # BLD AUTO: 0 K/UL (ref 0–0.5)
IMM GRANULOCYTES NFR BLD AUTO: 0 % (ref 0–5)
LYMPHOCYTES # BLD: 1 K/UL (ref 0.5–4.6)
LYMPHOCYTES NFR BLD: 12 % (ref 13–44)
MAGNESIUM SERPL-MCNC: 2.3 MG/DL (ref 1.8–2.4)
MCH RBC QN AUTO: 27.3 PG (ref 26.1–32.9)
MCHC RBC AUTO-ENTMCNC: 33.3 G/DL (ref 31.4–35)
MCV RBC AUTO: 82 FL (ref 82–102)
MONOCYTES # BLD: 0.5 K/UL (ref 0.1–1.3)
MONOCYTES NFR BLD: 6 % (ref 4–12)
NEUTS SEG # BLD: 6.8 K/UL (ref 1.7–8.2)
NEUTS SEG NFR BLD: 82 % (ref 43–78)
NRBC # BLD: 0 K/UL (ref 0–0.2)
PLATELET # BLD AUTO: 279 K/UL (ref 150–450)
PMV BLD AUTO: 9.2 FL (ref 9.4–12.3)
POTASSIUM SERPL-SCNC: 4 MMOL/L (ref 3.5–5.1)
PROT SERPL-MCNC: 6.6 G/DL (ref 6.3–8.2)
RBC # BLD AUTO: 5.28 M/UL (ref 4.05–5.2)
SODIUM SERPL-SCNC: 141 MMOL/L (ref 136–145)
WBC # BLD AUTO: 8.3 K/UL (ref 4.3–11.1)

## 2025-01-04 PROCEDURE — 73502 X-RAY EXAM HIP UNI 2-3 VIEWS: CPT

## 2025-01-04 PROCEDURE — 70450 CT HEAD/BRAIN W/O DYE: CPT

## 2025-01-04 PROCEDURE — 83735 ASSAY OF MAGNESIUM: CPT

## 2025-01-04 PROCEDURE — 99284 EMERGENCY DEPT VISIT MOD MDM: CPT

## 2025-01-04 PROCEDURE — 2580000003 HC RX 258: Performed by: EMERGENCY MEDICINE

## 2025-01-04 PROCEDURE — 96374 THER/PROPH/DIAG INJ IV PUSH: CPT

## 2025-01-04 PROCEDURE — 80053 COMPREHEN METABOLIC PANEL: CPT

## 2025-01-04 PROCEDURE — 85025 COMPLETE CBC W/AUTO DIFF WBC: CPT

## 2025-01-04 PROCEDURE — 6360000002 HC RX W HCPCS: Performed by: EMERGENCY MEDICINE

## 2025-01-04 RX ORDER — DEXAMETHASONE SODIUM PHOSPHATE 10 MG/ML
10 INJECTION INTRAMUSCULAR; INTRAVENOUS ONCE
Status: COMPLETED | OUTPATIENT
Start: 2025-01-04 | End: 2025-01-04

## 2025-01-04 RX ORDER — 0.9 % SODIUM CHLORIDE 0.9 %
1000 INTRAVENOUS SOLUTION INTRAVENOUS ONCE
Status: COMPLETED | OUTPATIENT
Start: 2025-01-04 | End: 2025-01-04

## 2025-01-04 RX ADMIN — SODIUM CHLORIDE 1000 ML: 9 INJECTION, SOLUTION INTRAVENOUS at 19:42

## 2025-01-04 RX ADMIN — DEXAMETHASONE SODIUM PHOSPHATE 10 MG: 10 INJECTION INTRAMUSCULAR; INTRAVENOUS at 21:57

## 2025-01-04 NOTE — ED TRIAGE NOTES
Pt arrives via [facility] from home for complaint of L leg tremors that started today. Pt with hx of MS.

## 2025-01-05 NOTE — ED NOTES
Patient mobility status  with no difficulty.     I have reviewed discharge instructions with the patient.  The patient verbalized understanding.    Patient left ED via Discharge Method: wheelchair to Home with Friend.    Opportunity for questions and clarification provided.     Patient given 0 scripts.           Sommer Sanches RN  01/04/25 4253

## 2025-01-05 NOTE — ED PROVIDER NOTES
Emergency Department Provider Note       PCP: Not, On File (Inactive)   Age: 54 y.o.   Sex: female     DISPOSITION Discharge - Pending Orders Complete 01/04/2025 09:25:17 PM    ICD-10-CM    1. Tremor  R25.1           Medical Decision Making     Patient is being evaluated for leg tremors and weakness.  She has a history of MS and transverse myelitis.  May be a related to those disease processes.  She will be worked up for any metabolic, electrolyte, or other intracranial process.  Blood work, CT imaging have been ordered.  ED Course as of 01/04/25 2127   Sat Jan 04, 2025 2038 The patient's blood work is unremarkable.  She has normal metabolic profile.  No leukocytosis.  CT of her head is negative.  Imaging of her hip is pending. [FERNIE]   2122 On repeat evaluation patient is feeling better.  Think that this is more likely to be an issue related to her MS.  She was given a dose of steroids.  I discussed admission to the hospital.  Patient states she would prefer to go home.  I encouraged her to follow-up with her physicians and return to the emergency department any worsening symptoms. [FERNIE]      ED Course User Index  [FERNIE] Leslie Jasso, DO     1 or more acute illnesses that pose a threat to life or bodily function.   Shared medical decision making was utilized in creating the patients health plan today.  I independently ordered and reviewed each unique test.           I interpreted the CT Scan CT of the head is negative for any acute process..              History     Patient is a 54-year-old female with past medical history of MS.  Patient states that she woke up today and was having difficulty with her left leg.  She has been having tremors noted today\" is not doing exactly what I wanted to do\".  She does endorse that this happened to her before the past.  Denies any fevers or chills or infectious symptoms.  Denies any other acute issues.    The history is provided by the patient.     Physical Exam

## 2025-02-08 ENCOUNTER — HOSPITAL ENCOUNTER (INPATIENT)
Age: 55
LOS: 11 days | Discharge: HOME HEALTH | DRG: 048 | End: 2025-02-19
Admitting: HOSPITALIST
Payer: MEDICAID

## 2025-02-08 ENCOUNTER — APPOINTMENT (OUTPATIENT)
Dept: MRI IMAGING | Age: 55
DRG: 048 | End: 2025-02-08
Payer: MEDICAID

## 2025-02-08 ENCOUNTER — APPOINTMENT (OUTPATIENT)
Dept: CT IMAGING | Age: 55
DRG: 048 | End: 2025-02-08
Payer: MEDICAID

## 2025-02-08 ENCOUNTER — APPOINTMENT (OUTPATIENT)
Dept: GENERAL RADIOLOGY | Age: 55
DRG: 048 | End: 2025-02-08
Payer: MEDICAID

## 2025-02-08 DIAGNOSIS — G62.9 NEUROPATHY: ICD-10-CM

## 2025-02-08 DIAGNOSIS — R52 GENERALIZED PAIN: Primary | ICD-10-CM

## 2025-02-08 PROBLEM — R20.8 BURNING SENSATION: Status: ACTIVE | Noted: 2025-02-08

## 2025-02-08 LAB
AMORPH CRY URNS QL MICRO: ABNORMAL
ANION GAP SERPL CALC-SCNC: 12 MMOL/L (ref 7–16)
APPEARANCE UR: CLEAR
BACTERIA URNS QL MICRO: ABNORMAL /HPF
BASOPHILS # BLD: 0.07 K/UL (ref 0–0.2)
BASOPHILS NFR BLD: 0.9 % (ref 0–2)
BILIRUB UR QL: NEGATIVE
BUN SERPL-MCNC: 11 MG/DL (ref 6–23)
CALCIUM SERPL-MCNC: 9.4 MG/DL (ref 8.8–10.2)
CHLORIDE SERPL-SCNC: 105 MMOL/L (ref 98–107)
CO2 SERPL-SCNC: 26 MMOL/L (ref 20–29)
COLOR UR: ABNORMAL
CREAT SERPL-MCNC: 0.93 MG/DL (ref 0.6–1.1)
DIFFERENTIAL METHOD BLD: NORMAL
EOSINOPHIL # BLD: 0.13 K/UL (ref 0–0.8)
EOSINOPHIL NFR BLD: 1.6 % (ref 0.5–7.8)
EPI CELLS #/AREA URNS HPF: ABNORMAL /HPF
ERYTHROCYTE [DISTWIDTH] IN BLOOD BY AUTOMATED COUNT: 13.2 % (ref 11.9–14.6)
FLUAV RNA SPEC QL NAA+PROBE: NOT DETECTED
FLUBV RNA SPEC QL NAA+PROBE: NOT DETECTED
GLUCOSE SERPL-MCNC: 121 MG/DL (ref 70–99)
GLUCOSE UR STRIP.AUTO-MCNC: NEGATIVE MG/DL
HCT VFR BLD AUTO: 40.7 % (ref 35.8–46.3)
HGB BLD-MCNC: 13.6 G/DL (ref 11.7–15.4)
HGB UR QL STRIP: NEGATIVE
IMM GRANULOCYTES # BLD AUTO: 0.04 K/UL (ref 0–0.5)
IMM GRANULOCYTES NFR BLD AUTO: 0.5 % (ref 0–5)
KETONES UR QL STRIP.AUTO: NEGATIVE MG/DL
LEUKOCYTE ESTERASE UR QL STRIP.AUTO: ABNORMAL
LYMPHOCYTES # BLD: 1.71 K/UL (ref 0.5–4.6)
LYMPHOCYTES NFR BLD: 21.1 % (ref 13–44)
MCH RBC QN AUTO: 27.6 PG (ref 26.1–32.9)
MCHC RBC AUTO-ENTMCNC: 33.4 G/DL (ref 31.4–35)
MCV RBC AUTO: 82.6 FL (ref 82–102)
MONOCYTES # BLD: 0.7 K/UL (ref 0.1–1.3)
MONOCYTES NFR BLD: 8.6 % (ref 4–12)
NEUTS SEG # BLD: 5.47 K/UL (ref 1.7–8.2)
NEUTS SEG NFR BLD: 67.3 % (ref 43–78)
NITRITE UR QL STRIP.AUTO: NEGATIVE
NRBC # BLD: 0 K/UL (ref 0–0.2)
OTHER OBSERVATIONS: ABNORMAL
PH UR STRIP: 7 (ref 5–9)
PLATELET # BLD AUTO: 211 K/UL (ref 150–450)
PMV BLD AUTO: 9.6 FL (ref 9.4–12.3)
POTASSIUM SERPL-SCNC: 3.6 MMOL/L (ref 3.5–5.1)
PROT UR STRIP-MCNC: NEGATIVE MG/DL
RBC # BLD AUTO: 4.93 M/UL (ref 4.05–5.2)
RBC #/AREA URNS HPF: ABNORMAL /HPF
SODIUM SERPL-SCNC: 143 MMOL/L (ref 136–145)
SP GR UR REFRACTOMETRY: 1.03 (ref 1–1.02)
UROBILINOGEN UR QL STRIP.AUTO: 1 EU/DL (ref 0.2–1)
WBC # BLD AUTO: 8.1 K/UL (ref 4.3–11.1)
WBC URNS QL MICRO: ABNORMAL /HPF

## 2025-02-08 PROCEDURE — 85025 COMPLETE CBC W/AUTO DIFF WBC: CPT

## 2025-02-08 PROCEDURE — 87502 INFLUENZA DNA AMP PROBE: CPT

## 2025-02-08 PROCEDURE — 99285 EMERGENCY DEPT VISIT HI MDM: CPT

## 2025-02-08 PROCEDURE — 71045 X-RAY EXAM CHEST 1 VIEW: CPT

## 2025-02-08 PROCEDURE — 80048 BASIC METABOLIC PNL TOTAL CA: CPT

## 2025-02-08 PROCEDURE — 70450 CT HEAD/BRAIN W/O DYE: CPT

## 2025-02-08 PROCEDURE — 81001 URINALYSIS AUTO W/SCOPE: CPT

## 2025-02-08 PROCEDURE — 6360000002 HC RX W HCPCS

## 2025-02-08 PROCEDURE — 1100000000 HC RM PRIVATE

## 2025-02-08 PROCEDURE — 71260 CT THORAX DX C+: CPT

## 2025-02-08 PROCEDURE — 96374 THER/PROPH/DIAG INJ IV PUSH: CPT

## 2025-02-08 PROCEDURE — 72125 CT NECK SPINE W/O DYE: CPT

## 2025-02-08 PROCEDURE — 6360000004 HC RX CONTRAST MEDICATION

## 2025-02-08 RX ORDER — IOPAMIDOL 755 MG/ML
100 INJECTION, SOLUTION INTRAVASCULAR
Status: COMPLETED | OUTPATIENT
Start: 2025-02-08 | End: 2025-02-08

## 2025-02-08 RX ORDER — MAGNESIUM SULFATE IN WATER 40 MG/ML
2000 INJECTION, SOLUTION INTRAVENOUS PRN
Status: DISCONTINUED | OUTPATIENT
Start: 2025-02-08 | End: 2025-02-19 | Stop reason: HOSPADM

## 2025-02-08 RX ORDER — POTASSIUM CHLORIDE 7.45 MG/ML
10 INJECTION INTRAVENOUS PRN
Status: DISCONTINUED | OUTPATIENT
Start: 2025-02-08 | End: 2025-02-19 | Stop reason: HOSPADM

## 2025-02-08 RX ORDER — ACETAMINOPHEN 650 MG/1
650 SUPPOSITORY RECTAL EVERY 6 HOURS PRN
Status: DISCONTINUED | OUTPATIENT
Start: 2025-02-08 | End: 2025-02-09 | Stop reason: SDUPTHER

## 2025-02-08 RX ORDER — SODIUM CHLORIDE 0.9 % (FLUSH) 0.9 %
5-40 SYRINGE (ML) INJECTION EVERY 12 HOURS SCHEDULED
Status: DISCONTINUED | OUTPATIENT
Start: 2025-02-09 | End: 2025-02-19 | Stop reason: HOSPADM

## 2025-02-08 RX ORDER — ACETAMINOPHEN 325 MG/1
650 TABLET ORAL EVERY 6 HOURS PRN
Status: DISCONTINUED | OUTPATIENT
Start: 2025-02-08 | End: 2025-02-09 | Stop reason: SDUPTHER

## 2025-02-08 RX ORDER — SODIUM CHLORIDE 9 MG/ML
INJECTION, SOLUTION INTRAVENOUS PRN
Status: DISCONTINUED | OUTPATIENT
Start: 2025-02-08 | End: 2025-02-19 | Stop reason: HOSPADM

## 2025-02-08 RX ORDER — ONDANSETRON 2 MG/ML
4 INJECTION INTRAMUSCULAR; INTRAVENOUS EVERY 6 HOURS PRN
Status: DISCONTINUED | OUTPATIENT
Start: 2025-02-08 | End: 2025-02-19 | Stop reason: HOSPADM

## 2025-02-08 RX ORDER — MORPHINE SULFATE 4 MG/ML
4 INJECTION, SOLUTION INTRAMUSCULAR; INTRAVENOUS ONCE
Status: COMPLETED | OUTPATIENT
Start: 2025-02-08 | End: 2025-02-08

## 2025-02-08 RX ORDER — ONDANSETRON 4 MG/1
4 TABLET, ORALLY DISINTEGRATING ORAL EVERY 8 HOURS PRN
Status: DISCONTINUED | OUTPATIENT
Start: 2025-02-08 | End: 2025-02-19 | Stop reason: HOSPADM

## 2025-02-08 RX ORDER — SODIUM CHLORIDE 0.9 % (FLUSH) 0.9 %
5-40 SYRINGE (ML) INJECTION PRN
Status: DISCONTINUED | OUTPATIENT
Start: 2025-02-08 | End: 2025-02-19 | Stop reason: HOSPADM

## 2025-02-08 RX ORDER — ENOXAPARIN SODIUM 100 MG/ML
40 INJECTION SUBCUTANEOUS DAILY
Status: DISCONTINUED | OUTPATIENT
Start: 2025-02-09 | End: 2025-02-19 | Stop reason: HOSPADM

## 2025-02-08 RX ORDER — POTASSIUM CHLORIDE 1500 MG/1
40 TABLET, EXTENDED RELEASE ORAL PRN
Status: DISCONTINUED | OUTPATIENT
Start: 2025-02-08 | End: 2025-02-19 | Stop reason: HOSPADM

## 2025-02-08 RX ORDER — POLYETHYLENE GLYCOL 3350 17 G/17G
17 POWDER, FOR SOLUTION ORAL DAILY PRN
Status: DISCONTINUED | OUTPATIENT
Start: 2025-02-08 | End: 2025-02-19 | Stop reason: HOSPADM

## 2025-02-08 RX ADMIN — MORPHINE SULFATE 4 MG: 4 INJECTION INTRAVENOUS at 18:26

## 2025-02-08 RX ADMIN — IOPAMIDOL 100 ML: 755 INJECTION, SOLUTION INTRAVENOUS at 20:06

## 2025-02-08 ASSESSMENT — PAIN DESCRIPTION - LOCATION: LOCATION: GENERALIZED

## 2025-02-08 ASSESSMENT — PAIN SCALES - GENERAL: PAINLEVEL_OUTOF10: 10

## 2025-02-08 ASSESSMENT — PAIN DESCRIPTION - DESCRIPTORS: DESCRIPTORS: BURNING

## 2025-02-08 NOTE — ED PROVIDER NOTES
for Constipation    TRAZODONE (DESYREL) 150 MG TABLET    Take 1 tablet by mouth nightly        Results from this emergency department visit:      Results for orders placed or performed during the hospital encounter of 02/08/25   Influenza A/B, Molecular    Specimen: Nasopharyngeal   Result Value Ref Range    Influenza A, SHIVAM Not detected NOTD      Influenza B, SHIVAM Not detected NOTD     CT HEAD WO CONTRAST    Narrative    Head CT    INDICATION: Trauma    TECHNIQUE: Multiple 2D axial images obtained through the brain without  intravenous contrast.  Radiation dose reduction techniques were used for this  study:  All CT scans performed at this facility use one or all of the following:  Automated exposure control, adjustment of the mA and/or kVp according to  patient's size, iterative reconstruction.    COMPARISON: Reviewed    FINDINGS: Ventriculostomy in place. No areas of abnormal attenuation are seen in  the brain. There is no CT evidence of acute hemorrhage or infarction. The  ventricles are normal in size. There are no extra-axial fluid collections. No  masses are seen. Scattered multifocal paranasal sinus disease       Impression    No CT evidence of acute intracranial abnormality.    Electronically signed by Kwesi Jaquez   XR CHEST PORTABLE    Narrative    Chest X-ray    INDICATION: Burning    COMPARISON:  None    TECHNIQUE: Frontal view of the chest was obtained.    FINDINGS: Increased hazy density overlying the right chest field. Right chest  clips. Left lung field unremarkable. No pleural effusion or pneumothorax. Normal  cardiomediastinal silhouette.      Impression    Increased hazy density overlying the right chest field, nonspecific.  In the absence of known etiology, CT chest recommended for further assessment.      Electronically signed by YAYA ARITA   CT CERVICAL SPINE WO CONTRAST    Narrative    CT of the Cervical Spine    INDICATION: Trauma    TECHNIQUE: [id number]  axial images were obtained

## 2025-02-08 NOTE — ED TRIAGE NOTES
Pt arrives via EMS coming from home with c/o worsening generalized pain. Reports hx of MS and pain feels similar but is worse. Pt denies changed in sensation, movement, vision, or speech. /60, HR mid 80s, SaO2 98% on RA. Pt denies having anything to take for her pain.

## 2025-02-09 LAB
ALBUMIN SERPL-MCNC: 3.4 G/DL (ref 3.5–5)
ALBUMIN/GLOB SERPL: 1.4 (ref 1–1.9)
ALP SERPL-CCNC: 80 U/L (ref 35–104)
ALT SERPL-CCNC: 9 U/L (ref 8–45)
ANION GAP SERPL CALC-SCNC: 13 MMOL/L (ref 7–16)
AST SERPL-CCNC: 15 U/L (ref 15–37)
BASOPHILS # BLD: 0.04 K/UL (ref 0–0.2)
BASOPHILS NFR BLD: 0.7 % (ref 0–2)
BILIRUB SERPL-MCNC: 0.8 MG/DL (ref 0–1.2)
BUN SERPL-MCNC: 11 MG/DL (ref 6–23)
CALCIUM SERPL-MCNC: 9.4 MG/DL (ref 8.8–10.2)
CHLORIDE SERPL-SCNC: 105 MMOL/L (ref 98–107)
CO2 SERPL-SCNC: 23 MMOL/L (ref 20–29)
CREAT SERPL-MCNC: 0.89 MG/DL (ref 0.6–1.1)
DIFFERENTIAL METHOD BLD: ABNORMAL
EOSINOPHIL # BLD: 0.13 K/UL (ref 0–0.8)
EOSINOPHIL NFR BLD: 2.2 % (ref 0.5–7.8)
ERYTHROCYTE [DISTWIDTH] IN BLOOD BY AUTOMATED COUNT: 13.4 % (ref 11.9–14.6)
GLOBULIN SER CALC-MCNC: 2.5 G/DL (ref 2.3–3.5)
GLUCOSE SERPL-MCNC: 95 MG/DL (ref 70–99)
HCT VFR BLD AUTO: 37.9 % (ref 35.8–46.3)
HGB BLD-MCNC: 12.8 G/DL (ref 11.7–15.4)
HIV 1+2 AB+HIV1 P24 AG SERPL QL IA: NONREACTIVE
HIV 1/2 RESULT COMMENT: NORMAL
IMM GRANULOCYTES # BLD AUTO: 0.01 K/UL (ref 0–0.5)
IMM GRANULOCYTES NFR BLD AUTO: 0.2 % (ref 0–5)
LYMPHOCYTES # BLD: 1.21 K/UL (ref 0.5–4.6)
LYMPHOCYTES NFR BLD: 20.9 % (ref 13–44)
MCH RBC QN AUTO: 27.2 PG (ref 26.1–32.9)
MCHC RBC AUTO-ENTMCNC: 33.8 G/DL (ref 31.4–35)
MCV RBC AUTO: 80.6 FL (ref 82–102)
MONOCYTES # BLD: 0.53 K/UL (ref 0.1–1.3)
MONOCYTES NFR BLD: 9.2 % (ref 4–12)
NEUTS SEG # BLD: 3.86 K/UL (ref 1.7–8.2)
NEUTS SEG NFR BLD: 66.8 % (ref 43–78)
NRBC # BLD: 0 K/UL (ref 0–0.2)
PLATELET # BLD AUTO: 204 K/UL (ref 150–450)
PMV BLD AUTO: 9.5 FL (ref 9.4–12.3)
POTASSIUM SERPL-SCNC: 4.5 MMOL/L (ref 3.5–5.1)
PROT SERPL-MCNC: 6 G/DL (ref 6.3–8.2)
RBC # BLD AUTO: 4.7 M/UL (ref 4.05–5.2)
SODIUM SERPL-SCNC: 141 MMOL/L (ref 136–145)
WBC # BLD AUTO: 5.8 K/UL (ref 4.3–11.1)

## 2025-02-09 PROCEDURE — 6370000000 HC RX 637 (ALT 250 FOR IP): Performed by: STUDENT IN AN ORGANIZED HEALTH CARE EDUCATION/TRAINING PROGRAM

## 2025-02-09 PROCEDURE — 80053 COMPREHEN METABOLIC PANEL: CPT

## 2025-02-09 PROCEDURE — 86341 ISLET CELL ANTIBODY: CPT

## 2025-02-09 PROCEDURE — 6370000000 HC RX 637 (ALT 250 FOR IP): Performed by: HOSPITALIST

## 2025-02-09 PROCEDURE — 36415 COLL VENOUS BLD VENIPUNCTURE: CPT

## 2025-02-09 PROCEDURE — 6360000002 HC RX W HCPCS: Performed by: INTERNAL MEDICINE

## 2025-02-09 PROCEDURE — 99223 1ST HOSP IP/OBS HIGH 75: CPT | Performed by: STUDENT IN AN ORGANIZED HEALTH CARE EDUCATION/TRAINING PROGRAM

## 2025-02-09 PROCEDURE — 1100000003 HC PRIVATE W/ TELEMETRY

## 2025-02-09 PROCEDURE — 6360000002 HC RX W HCPCS: Performed by: HOSPITALIST

## 2025-02-09 PROCEDURE — 2500000003 HC RX 250 WO HCPCS: Performed by: HOSPITALIST

## 2025-02-09 PROCEDURE — 87389 HIV-1 AG W/HIV-1&-2 AB AG IA: CPT

## 2025-02-09 PROCEDURE — 85025 COMPLETE CBC W/AUTO DIFF WBC: CPT

## 2025-02-09 PROCEDURE — 6370000000 HC RX 637 (ALT 250 FOR IP): Performed by: INTERNAL MEDICINE

## 2025-02-09 RX ORDER — SENNA AND DOCUSATE SODIUM 50; 8.6 MG/1; MG/1
1 TABLET, FILM COATED ORAL DAILY PRN
Status: DISCONTINUED | OUTPATIENT
Start: 2025-02-09 | End: 2025-02-19 | Stop reason: HOSPADM

## 2025-02-09 RX ORDER — OXYBUTYNIN CHLORIDE 10 MG/1
10 TABLET, EXTENDED RELEASE ORAL DAILY
Status: DISCONTINUED | OUTPATIENT
Start: 2025-02-09 | End: 2025-02-09

## 2025-02-09 RX ORDER — TRAZODONE HYDROCHLORIDE 50 MG/1
150 TABLET ORAL NIGHTLY
Status: DISCONTINUED | OUTPATIENT
Start: 2025-02-09 | End: 2025-02-19 | Stop reason: HOSPADM

## 2025-02-09 RX ORDER — DULOXETIN HYDROCHLORIDE 30 MG/1
30 CAPSULE, DELAYED RELEASE ORAL DAILY
Status: DISCONTINUED | OUTPATIENT
Start: 2025-02-09 | End: 2025-02-19 | Stop reason: HOSPADM

## 2025-02-09 RX ORDER — PREGABALIN 100 MG/1
100 CAPSULE ORAL 3 TIMES DAILY
Status: DISCONTINUED | OUTPATIENT
Start: 2025-02-09 | End: 2025-02-19 | Stop reason: HOSPADM

## 2025-02-09 RX ORDER — GABAPENTIN 300 MG/1
300 CAPSULE ORAL 3 TIMES DAILY
Status: DISCONTINUED | OUTPATIENT
Start: 2025-02-09 | End: 2025-02-09

## 2025-02-09 RX ORDER — MORPHINE SULFATE 4 MG/ML
4 INJECTION, SOLUTION INTRAMUSCULAR; INTRAVENOUS ONCE
Status: COMPLETED | OUTPATIENT
Start: 2025-02-09 | End: 2025-02-09

## 2025-02-09 RX ORDER — HYDROCODONE BITARTRATE AND ACETAMINOPHEN 7.5; 325 MG/1; MG/1
1 TABLET ORAL EVERY 6 HOURS PRN
Status: DISCONTINUED | OUTPATIENT
Start: 2025-02-09 | End: 2025-02-19 | Stop reason: HOSPADM

## 2025-02-09 RX ADMIN — ENOXAPARIN SODIUM 40 MG: 100 INJECTION SUBCUTANEOUS at 09:05

## 2025-02-09 RX ADMIN — TRAZODONE HYDROCHLORIDE 150 MG: 50 TABLET ORAL at 00:40

## 2025-02-09 RX ADMIN — ACETAMINOPHEN 650 MG: 325 TABLET ORAL at 03:55

## 2025-02-09 RX ADMIN — PREGABALIN 100 MG: 100 CAPSULE ORAL at 13:30

## 2025-02-09 RX ADMIN — PREGABALIN 100 MG: 100 CAPSULE ORAL at 10:59

## 2025-02-09 RX ADMIN — TRAZODONE HYDROCHLORIDE 150 MG: 50 TABLET ORAL at 20:36

## 2025-02-09 RX ADMIN — SODIUM CHLORIDE, PRESERVATIVE FREE 10 ML: 5 INJECTION INTRAVENOUS at 09:05

## 2025-02-09 RX ADMIN — PREGABALIN 100 MG: 100 CAPSULE ORAL at 20:36

## 2025-02-09 RX ADMIN — DULOXETINE HYDROCHLORIDE 30 MG: 30 CAPSULE, DELAYED RELEASE ORAL at 10:59

## 2025-02-09 RX ADMIN — MORPHINE SULFATE 4 MG: 4 INJECTION INTRAVENOUS at 10:49

## 2025-02-09 RX ADMIN — SODIUM CHLORIDE, PRESERVATIVE FREE 10 ML: 5 INJECTION INTRAVENOUS at 20:37

## 2025-02-09 RX ADMIN — FLUOXETINE HYDROCHLORIDE 20 MG: 20 CAPSULE ORAL at 09:05

## 2025-02-09 ASSESSMENT — PAIN DESCRIPTION - FREQUENCY: FREQUENCY: INTERMITTENT

## 2025-02-09 ASSESSMENT — PAIN SCALES - GENERAL
PAINLEVEL_OUTOF10: 0
PAINLEVEL_OUTOF10: 0
PAINLEVEL_OUTOF10: 5
PAINLEVEL_OUTOF10: 2
PAINLEVEL_OUTOF10: 10

## 2025-02-09 ASSESSMENT — PAIN DESCRIPTION - PAIN TYPE: TYPE: CHRONIC PAIN

## 2025-02-09 ASSESSMENT — PAIN DESCRIPTION - DESCRIPTORS
DESCRIPTORS: ACHING
DESCRIPTORS: ACHING

## 2025-02-09 ASSESSMENT — PAIN DESCRIPTION - LOCATION
LOCATION: GENERALIZED
LOCATION: GENERALIZED

## 2025-02-09 ASSESSMENT — PAIN DESCRIPTION - ONSET: ONSET: ON-GOING

## 2025-02-09 NOTE — ED NOTES
Report given to DAFNE Graham and care assumed at this time.      Davina Zuniga RN  02/08/25 [id number]

## 2025-02-09 NOTE — CARE COORDINATION
Pt presented to the ED c/o diffuse burning sensation all over her body. Medical hx includes: breast cancer, cervical myelopathy, transverse myelitis, multiple sclerosis and dyslipidemia. PTA, pt fairly indep with his ADLs. Lives with family in a trailer. Does not drive, relies on family for transportation. On RA. Uses a WC, cane and RW for ambulation assistance. Has a h/o HH and STR in the past. PT/OT consulted. Does not have an established PCP. [employer] insurance verified and able to afford home meds. Await therapies recs to discern d/c needs.        02/09/25 2974   Service Assessment   Patient Orientation Alert and Oriented   Cognition Alert   History Provided By Patient   Primary Caregiver Family   Support Systems Children;Family Members;Buddhism/[place] Community;Friends/Neighbors   Patient's Healthcare Decision Maker is: Legal Next of Kin   PCP Verified by CM No   Prior Functional Level Assistance with the following:;Mobility;Housework;Shopping   Current Functional Level Assistance with the following:;Mobility;Housework;Shopping   Can patient return to prior living arrangement Unknown at present   Ability to make needs known: Good   Family able to assist with home care needs: Yes   Would you like for me to discuss the discharge plan with any other family members/significant others, and if so, who? No   Financial Resources Medicaid   Social/Functional History   Lives With Family   Type of Home Trailer   Home Layout One level   Bathroom Shower/Tub Walk-in shower   Bathroom Toilet Standard   Bathroom Accessibility Accessible   Home Equipment Walker - Rolling;Cane;Wheelchair - Manual   Receives Help From Family   Prior Level of Assist for ADLs Independent   Prior Level of Assist for Homemaking Independent   Ambulation Assistance Needs assistance   Prior Level of Assist for Transfers Independent   Active  No   Occupation On disability   Services At/After Discharge   Transition of Care Consult (CM

## 2025-02-09 NOTE — CONSULTS
Neurology Consult Note       History:   54-year-old female with cervical myelopathy s/p decompression, ?NPH s/p VPS presents with diffuse discomfort from her arms down.  Described as diffuse burning pain, with intermittent shooting pains down her spine. Followed with [facility] neurology in clinic for progressive gait instability.  Worked up for multiple sclerosis, no oligoclonal bands and no inflammation in the CSF. She reports her symptoms first noticed were due to recurrent falls with partial recovery.       Exam: Pertinent positives and negatives include:  Somnolent but awakens to voice.  Engages in conversation.  Symmetric strength throughout.  No upper extremity spasticity appreciated.  Positive Darlene on the left.  2+ reflexes upper extremities.  3+ with crossed adduction at the patellar's in the lower extremities.  No ankle clonus is present.  Upgoing toes to plantar stimulation bilaterally.     Imaging and review of data:   MRI cervical spine from 4/2024 with abnormal T2 signal within the cord.  Per report felt to be most consistent with compressive myelomalacia.  I am unable to personally review the brain and T-spine.    CSF without inflammatory profile, negative oligoclonal bands      Assessment and Plan:   54-year-old female with progressive gait impairment, myelopathic changes on MRI as above.  Etiology unclear. There is no evidence for multiple sclerosis and this seems unlikely given her prior non-inflammatory CSF profile. Other causes of myelopathic disease should be considered, e.g. degenerative, hereditary (eg HSP), less likely infectious (eg HTLV myelopathy).      Plan:  Repeat MRI brain, MRI cervical spine w/wo once  shunt can be cleared by neurosurgery (not urgent).     Lab workup pending, to screen for HTLV, hereditary spastic paraplegia.     Symptomatic pain control with Lyrica 100 mg 3 times daily.     She needs to continue to follow-up with Dr. Arnett at [facility] for continued evaluation.

## 2025-02-09 NOTE — H&P
Amorphous Crystal TRACE 0      Other observations RESULTS VERIFIED MANUALLY         No results for input(s): \"COVID19\" in the last 72 hours.    CT CHEST W CONTRAST    Result Date: 2/8/2025  EXAM: CT CHEST WITHOUT CONTRAST HISTORY: 54-year-old with shortness of breath COMPARISON:  Chest x-ray 8 February 2025 TECHNIQUE: Multiple axial images are taken from the level the thyroid down to the upper abdomen without the use of IV contrast. Images were then reconstructed in the sagittal and coronal planes. This exam was performed according to our departmental dose-optimization program which includes use of Automated Exposure Control, adjustment of the mA and/or kV according to patient size and/or use of iterative reconstruction technique. FINDINGS: Evaluation limited due to lack of IV contrast. : The  demonstrates an enlarged heart. Lines and tubes: Right-sided breast implant demonstrated.  shunt tubing which is seen overlying the left abdomen entering through the upper central abdominal wall. Lungs: There is some atelectasis in the lung bases. Trachea/Main Bronchi: Well aerated. No intraluminal mass. Esophagus: Decompressed. Pleura:  No  pneumothorax. No pneumomediastinum.. No effusion. Heart: The heart measures mildly enlarged. Aorta: The ascending aorta measures 40 mm Pulmonary Artery: Normal in size. Further evaluation is limited due to lack of IV contrast. Lymph Nodes: Normal. Chest Wall: Right breast implant demonstrated. Thyroid: Normal. Osseous: Postop change of the cervical spine incompletely imaged. Subdiaphragmatic: Subdiaphragmatic abdominal organs including the field-of-view demonstrate fatty infiltration of the liver.     1. Mild cardiomegaly. 2. Ascending aortic aneurysm at 40 mm. 3. Right breast implant. 4. Hepatomegaly. Please correlate with LFTs. 5.  shunt tubing incompletely imaged Tyler Citation: Information used in this report was referenced from the following. Guidelines for

## 2025-02-09 NOTE — ED NOTES
TRANSFER - OUT REPORT:    Verbal report given to RN on Alva Kramer  being transferred to 506 for routine progression of patient care       Report consisted of patient's Situation, Background, Assessment and   Recommendations(SBAR).     Information from the following report(s) ED SBAR was reviewed with the receiving nurse.    Brandee Fall Assessment:    Presents to emergency department  because of falls (Syncope, seizure, or loss of consciousness): No  Age > 70: No  Altered Mental Status, Intoxication with alcohol or substance confusion (Disorientation, impaired judgment, poor safety awaremess, or inability to follow instructions): No  Impaired Mobility: Ambulates or transfers with assistive devices or assistance; Unable to ambulate or transer.: No  Nursing Judgement: No          Lines:   Peripheral IV 02/08/25 Right Antecubital (Active)        Opportunity for questions and clarification was provided.      Patient transported with:  Registered Nurse          Sommer Sanches RN  02/09/25 [id number]

## 2025-02-10 LAB
ANION GAP SERPL CALC-SCNC: 11 MMOL/L (ref 7–16)
BASOPHILS # BLD: 0.06 K/UL (ref 0–0.2)
BASOPHILS NFR BLD: 1 % (ref 0–2)
BUN SERPL-MCNC: 14 MG/DL (ref 6–23)
CALCIUM SERPL-MCNC: 9.3 MG/DL (ref 8.8–10.2)
CHLORIDE SERPL-SCNC: 106 MMOL/L (ref 98–107)
CO2 SERPL-SCNC: 24 MMOL/L (ref 20–29)
CREAT SERPL-MCNC: 0.79 MG/DL (ref 0.6–1.1)
DIFFERENTIAL METHOD BLD: NORMAL
EOSINOPHIL # BLD: 0.15 K/UL (ref 0–0.8)
EOSINOPHIL NFR BLD: 2.5 % (ref 0.5–7.8)
ERYTHROCYTE [DISTWIDTH] IN BLOOD BY AUTOMATED COUNT: 13.2 % (ref 11.9–14.6)
GLUCOSE SERPL-MCNC: 95 MG/DL (ref 70–99)
HCT VFR BLD AUTO: 40.9 % (ref 35.8–46.3)
HGB BLD-MCNC: 13 G/DL (ref 11.7–15.4)
IMM GRANULOCYTES # BLD AUTO: 0.01 K/UL (ref 0–0.5)
IMM GRANULOCYTES NFR BLD AUTO: 0.2 % (ref 0–5)
LYMPHOCYTES # BLD: 1.51 K/UL (ref 0.5–4.6)
LYMPHOCYTES NFR BLD: 24.9 % (ref 13–44)
MCH RBC QN AUTO: 27.1 PG (ref 26.1–32.9)
MCHC RBC AUTO-ENTMCNC: 31.8 G/DL (ref 31.4–35)
MCV RBC AUTO: 85.2 FL (ref 82–102)
MONOCYTES # BLD: 0.47 K/UL (ref 0.1–1.3)
MONOCYTES NFR BLD: 7.8 % (ref 4–12)
NEUTS SEG # BLD: 3.86 K/UL (ref 1.7–8.2)
NEUTS SEG NFR BLD: 63.6 % (ref 43–78)
NRBC # BLD: 0 K/UL (ref 0–0.2)
PLATELET # BLD AUTO: 195 K/UL (ref 150–450)
PMV BLD AUTO: 9.8 FL (ref 9.4–12.3)
POTASSIUM SERPL-SCNC: 4.4 MMOL/L (ref 3.5–5.1)
RBC # BLD AUTO: 4.8 M/UL (ref 4.05–5.2)
SODIUM SERPL-SCNC: 141 MMOL/L (ref 136–145)
WBC # BLD AUTO: 6.1 K/UL (ref 4.3–11.1)

## 2025-02-10 PROCEDURE — 1100000003 HC PRIVATE W/ TELEMETRY

## 2025-02-10 PROCEDURE — 97535 SELF CARE MNGMENT TRAINING: CPT

## 2025-02-10 PROCEDURE — 6370000000 HC RX 637 (ALT 250 FOR IP): Performed by: HOSPITALIST

## 2025-02-10 PROCEDURE — 2500000003 HC RX 250 WO HCPCS: Performed by: HOSPITALIST

## 2025-02-10 PROCEDURE — 6370000000 HC RX 637 (ALT 250 FOR IP): Performed by: INTERNAL MEDICINE

## 2025-02-10 PROCEDURE — 97165 OT EVAL LOW COMPLEX 30 MIN: CPT

## 2025-02-10 PROCEDURE — 6360000002 HC RX W HCPCS: Performed by: HOSPITALIST

## 2025-02-10 PROCEDURE — 6370000000 HC RX 637 (ALT 250 FOR IP): Performed by: STUDENT IN AN ORGANIZED HEALTH CARE EDUCATION/TRAINING PROGRAM

## 2025-02-10 PROCEDURE — 97530 THERAPEUTIC ACTIVITIES: CPT

## 2025-02-10 PROCEDURE — 85025 COMPLETE CBC W/AUTO DIFF WBC: CPT

## 2025-02-10 PROCEDURE — 36415 COLL VENOUS BLD VENIPUNCTURE: CPT

## 2025-02-10 PROCEDURE — 80048 BASIC METABOLIC PNL TOTAL CA: CPT

## 2025-02-10 PROCEDURE — 97161 PT EVAL LOW COMPLEX 20 MIN: CPT

## 2025-02-10 RX ADMIN — PREGABALIN 100 MG: 100 CAPSULE ORAL at 08:48

## 2025-02-10 RX ADMIN — SODIUM CHLORIDE, PRESERVATIVE FREE 10 ML: 5 INJECTION INTRAVENOUS at 08:52

## 2025-02-10 RX ADMIN — ENOXAPARIN SODIUM 40 MG: 100 INJECTION SUBCUTANEOUS at 08:48

## 2025-02-10 RX ADMIN — TRAZODONE HYDROCHLORIDE 150 MG: 50 TABLET ORAL at 20:30

## 2025-02-10 RX ADMIN — PREGABALIN 100 MG: 100 CAPSULE ORAL at 15:09

## 2025-02-10 RX ADMIN — SODIUM CHLORIDE, PRESERVATIVE FREE 10 ML: 5 INJECTION INTRAVENOUS at 20:31

## 2025-02-10 RX ADMIN — PREGABALIN 100 MG: 100 CAPSULE ORAL at 20:30

## 2025-02-10 RX ADMIN — DULOXETINE HYDROCHLORIDE 30 MG: 30 CAPSULE, DELAYED RELEASE ORAL at 08:48

## 2025-02-10 ASSESSMENT — PAIN SCALES - GENERAL
PAINLEVEL_OUTOF10: 0

## 2025-02-10 NOTE — PLAN OF CARE
Problem: Safety - Adult  Goal: Free from fall injury  Outcome: Progressing  Flowsheets (Taken 2/9/2025 2004)  Free From Fall Injury: Instruct family/caregiver on patient safety     Problem: Skin/Tissue Integrity  Goal: Skin integrity remains intact  Description: 1.  Monitor for areas of redness and/or skin breakdown  2.  Assess vascular access sites hourly  3.  Every 4-6 hours minimum:  Change oxygen saturation probe site  4.  Every 4-6 hours:  If on nasal continuous positive airway pressure, respiratory therapy assess nares and determine need for appliance change or resting period  Recent Flowsheet Documentation  Taken 2/9/2025 2004 by Shelby Cantrell, RN  Skin Integrity Remains Intact: Monitor for areas of redness and/or skin breakdown

## 2025-02-10 NOTE — CARE COORDINATION
RN CM met with the patient at the bedside and discussed the recommendation for acute inpatient rehabilitation. She was provided with a list of acute inpatient rehabilitation facilities and their quality metrics. She selected [facility]. A referral was sent and is pending review. She expressed concerns regarding her potential out of pocket costs. She will require insurance pre-certification for acute inpatient rehabilitation.

## 2025-02-10 NOTE — THERAPY EVALUATION
Care [] [] [] [] [] [] [] [] [] [x]    Functional Mobility [] [] [] [] [x] [x] [] [] [] [] X2, Rolling walker, household distances   I=Independent, Mod I=Modified Independent, S=Supervision/Setup, SBA=Standby Assistance, CGA=Contact Guard Assistance, Min=Minimal Assistance, Mod=Moderate Assistance, Max=Maximal Assistance, Total=Total Assistance, NT=Not Tested    PLAN:   FREQUENCY/DURATION   OT Plan of Care: 3 times/week for duration of hospital stay or until stated goals are met, whichever comes first.    PROBLEM LIST:   (Skilled intervention is medically necessary to address:)  Decreased ADL/Functional Activities  Decreased Activity Tolerance  Decreased Balance  Decreased Coordination  Decreased Gait Ability  Decreased Strength  Decreased Transfer Abilities   INTERVENTIONS PLANNED:  (Benefits and precautions of occupational therapy have been discussed with the patient.)  Self Care Training  Therapeutic Activity  Therapeutic Exercise/HEP  Neuromuscular Re-education  Manual Therapy  Education         TREATMENT:     EVALUATION: LOW COMPLEXITY: (Untimed Charge)  The initial evaluation charge encompasses clinical chart review, objective assessment, interpretation of assessment, and skilled monitoring of the patient's response to treatment in order to develop a plan of care.     TREATMENT:   Co-Treatment between OT and PT necessary due to patient's decreased overall endurance/tolerance levels, as well as need for high level skilled assistance to complete functional transfers/mobility and functional tasks  Self Care (10 minutes): Patient participated in toileting, lower body dressing, grooming, functional mobility, bed mobility, functional transfer, and bathroom mobility in unsupported sitting and standing with minimal verbal, manual, and tactile cueing to increase independence, decrease assistance required, and increase activity tolerance. The patient was educated on role of occupational therapy, compensatory technique

## 2025-02-10 NOTE — CARE COORDINATION
Discharge planning was discussed with the Interdisciplinary Team. Per the physical therapist, acute inpatient rehabilitation has been recommended.

## 2025-02-11 PROBLEM — G91.2 NPH (NORMAL PRESSURE HYDROCEPHALUS) (HCC): Chronic | Status: ACTIVE | Noted: 2024-04-17

## 2025-02-11 PROBLEM — Z59.819 HOUSING INSTABILITY: Chronic | Status: ACTIVE | Noted: 2025-02-11

## 2025-02-11 PROBLEM — Z59.819 HOUSING INSTABILITY: Status: ACTIVE | Noted: 2025-02-11

## 2025-02-11 PROBLEM — Z98.2 STATUS POST VENTRICULO-PERITONEAL SHUNT PLACEMENT: Chronic | Status: ACTIVE | Noted: 2024-04-17

## 2025-02-11 PROBLEM — G35 MULTIPLE SCLEROSIS (HCC): Chronic | Status: ACTIVE | Noted: 2024-04-17

## 2025-02-11 PROBLEM — G95.9 CERVICAL MYELOPATHY (HCC): Chronic | Status: ACTIVE | Noted: 2024-04-17

## 2025-02-11 PROBLEM — G62.9 NEUROPATHY: Status: ACTIVE | Noted: 2025-02-08

## 2025-02-11 LAB
Lab: NORMAL
Lab: NORMAL
REFERENCE LAB: NORMAL

## 2025-02-11 PROCEDURE — 6370000000 HC RX 637 (ALT 250 FOR IP): Performed by: HOSPITALIST

## 2025-02-11 PROCEDURE — 1100000000 HC RM PRIVATE

## 2025-02-11 PROCEDURE — 6370000000 HC RX 637 (ALT 250 FOR IP): Performed by: STUDENT IN AN ORGANIZED HEALTH CARE EDUCATION/TRAINING PROGRAM

## 2025-02-11 PROCEDURE — 6360000002 HC RX W HCPCS: Performed by: HOSPITALIST

## 2025-02-11 PROCEDURE — 36415 COLL VENOUS BLD VENIPUNCTURE: CPT

## 2025-02-11 PROCEDURE — 97530 THERAPEUTIC ACTIVITIES: CPT

## 2025-02-11 PROCEDURE — 6370000000 HC RX 637 (ALT 250 FOR IP): Performed by: INTERNAL MEDICINE

## 2025-02-11 PROCEDURE — 2500000003 HC RX 250 WO HCPCS: Performed by: HOSPITALIST

## 2025-02-11 PROCEDURE — 99221 1ST HOSP IP/OBS SF/LOW 40: CPT | Performed by: STUDENT IN AN ORGANIZED HEALTH CARE EDUCATION/TRAINING PROGRAM

## 2025-02-11 RX ADMIN — HYDROCODONE BITARTRATE AND ACETAMINOPHEN 1 TABLET: 7.5; 325 TABLET ORAL at 07:51

## 2025-02-11 RX ADMIN — SODIUM CHLORIDE, PRESERVATIVE FREE 10 ML: 5 INJECTION INTRAVENOUS at 20:51

## 2025-02-11 RX ADMIN — SODIUM CHLORIDE, PRESERVATIVE FREE 10 ML: 5 INJECTION INTRAVENOUS at 07:53

## 2025-02-11 RX ADMIN — PREGABALIN 100 MG: 100 CAPSULE ORAL at 14:22

## 2025-02-11 RX ADMIN — POLYETHYLENE GLYCOL 3350 17 G: 17 POWDER, FOR SOLUTION ORAL at 12:14

## 2025-02-11 RX ADMIN — PREGABALIN 100 MG: 100 CAPSULE ORAL at 20:51

## 2025-02-11 RX ADMIN — PREGABALIN 100 MG: 100 CAPSULE ORAL at 07:51

## 2025-02-11 RX ADMIN — TRAZODONE HYDROCHLORIDE 150 MG: 50 TABLET ORAL at 20:50

## 2025-02-11 RX ADMIN — ENOXAPARIN SODIUM 40 MG: 100 INJECTION SUBCUTANEOUS at 07:52

## 2025-02-11 RX ADMIN — DULOXETINE HYDROCHLORIDE 30 MG: 30 CAPSULE, DELAYED RELEASE ORAL at 07:51

## 2025-02-11 ASSESSMENT — PAIN SCALES - GENERAL
PAINLEVEL_OUTOF10: 0
PAINLEVEL_OUTOF10: 0
PAINLEVEL_OUTOF10: 2
PAINLEVEL_OUTOF10: 0

## 2025-02-11 ASSESSMENT — PAIN DESCRIPTION - DESCRIPTORS: DESCRIPTORS: ACHING

## 2025-02-11 ASSESSMENT — PAIN DESCRIPTION - ORIENTATION: ORIENTATION: RIGHT;LEFT

## 2025-02-11 ASSESSMENT — PAIN DESCRIPTION - LOCATION: LOCATION: LEG

## 2025-02-11 NOTE — CARE COORDINATION
CM met with the patient to discuss snf options. Patient is adamant about not going to a snf because when she went before she had to pay for it and she's not willing to give up her disability check. PIERRE will send referral to [facility].

## 2025-02-11 NOTE — CONSULTS
[facility]  Inpatient Rehab Consult        Admission Date: 2/8/2025  Primary Care Provider: No primary care provider on file.  Specialty Group / Referring Service: Medicine      Chief Complaint : Lower extremity pain  Admitting Diagnosis:   Burning sensation [R20.8]  Generalized pain [R52]    Principal Problem:    Neuropathy  Active Problems:    Cervical myelopathy (HCC)    NPH (normal pressure hydrocephalus) (HCC)    Status post ventriculo-peritoneal shunt placement    Housing instability  Resolved Problems:    * No resolved hospital problems. *      Acute Rehab Diagnoses:  Encounter for rehabilitation [Z51.89]   Abnormality of gait and mobility [R26.9]  Decreased independence for activities of daily living (ADL) [Z78.9]  Physical debility / deconditioning [R53.81]      Medical Dx:No past medical history on file.    Date of Evaluation: February 11, 2025    Subjective       HPI: Alva Kramer is a 54 y.o. female patient who presented to [facility] on 2/8/2025 secondary to severe lower extremity dysesthesias. Per H&P \" Alva Kramer is a 54 y.o. female with medical history of breast cancer, cervical myelopathy, transverse myelitis, multiple sclerosis not sure if she is on any medications, dyslipidemia presented to emergency room with chief complaint of diffuse burning sensation all over the body started couple of days ago, continued to get worse. Patient denies any localized weakness, patient reports history of chronic left upper and lower extremity heaviness which has not changed much. Evaluated in the emergency room, urinalysis did not show any UTI, CT chest was done did not show any infiltrates. CT head did not show any acute intracranial changes, emergency room physician contacted neurologist who recommended obtaining MRI first, if MRI shows any new lesions recommended steroids. Patient has  shunt for normal pressure hydrocephalus which need to be reprogrammed after MRI, MRI tech

## 2025-02-12 LAB — GAD65 AB SER-ACNC: <5 U/ML (ref 0–5)

## 2025-02-12 PROCEDURE — 2500000003 HC RX 250 WO HCPCS: Performed by: HOSPITALIST

## 2025-02-12 PROCEDURE — 97535 SELF CARE MNGMENT TRAINING: CPT

## 2025-02-12 PROCEDURE — 97530 THERAPEUTIC ACTIVITIES: CPT

## 2025-02-12 PROCEDURE — 1100000000 HC RM PRIVATE

## 2025-02-12 PROCEDURE — 6360000002 HC RX W HCPCS: Performed by: HOSPITALIST

## 2025-02-12 PROCEDURE — 6370000000 HC RX 637 (ALT 250 FOR IP): Performed by: HOSPITALIST

## 2025-02-12 PROCEDURE — 6370000000 HC RX 637 (ALT 250 FOR IP): Performed by: STUDENT IN AN ORGANIZED HEALTH CARE EDUCATION/TRAINING PROGRAM

## 2025-02-12 PROCEDURE — 6370000000 HC RX 637 (ALT 250 FOR IP): Performed by: INTERNAL MEDICINE

## 2025-02-12 RX ADMIN — PREGABALIN 100 MG: 100 CAPSULE ORAL at 15:27

## 2025-02-12 RX ADMIN — SODIUM CHLORIDE, PRESERVATIVE FREE 10 ML: 5 INJECTION INTRAVENOUS at 21:05

## 2025-02-12 RX ADMIN — PREGABALIN 100 MG: 100 CAPSULE ORAL at 21:04

## 2025-02-12 RX ADMIN — HYDROCODONE BITARTRATE AND ACETAMINOPHEN 1 TABLET: 7.5; 325 TABLET ORAL at 08:51

## 2025-02-12 RX ADMIN — DULOXETINE HYDROCHLORIDE 30 MG: 30 CAPSULE, DELAYED RELEASE ORAL at 08:51

## 2025-02-12 RX ADMIN — TRAZODONE HYDROCHLORIDE 150 MG: 50 TABLET ORAL at 21:04

## 2025-02-12 RX ADMIN — SODIUM CHLORIDE, PRESERVATIVE FREE 10 ML: 5 INJECTION INTRAVENOUS at 08:53

## 2025-02-12 RX ADMIN — ENOXAPARIN SODIUM 40 MG: 100 INJECTION SUBCUTANEOUS at 08:51

## 2025-02-12 RX ADMIN — PREGABALIN 100 MG: 100 CAPSULE ORAL at 08:51

## 2025-02-12 ASSESSMENT — PAIN - FUNCTIONAL ASSESSMENT: PAIN_FUNCTIONAL_ASSESSMENT: ACTIVITIES ARE NOT PREVENTED

## 2025-02-12 ASSESSMENT — PAIN SCALES - GENERAL
PAINLEVEL_OUTOF10: 0
PAINLEVEL_OUTOF10: 0
PAINLEVEL_OUTOF10: 10
PAINLEVEL_OUTOF10: 0
PAINLEVEL_OUTOF10: 0

## 2025-02-12 ASSESSMENT — PAIN DESCRIPTION - LOCATION: LOCATION: LEG

## 2025-02-12 ASSESSMENT — PAIN DESCRIPTION - DESCRIPTORS: DESCRIPTORS: ACHING

## 2025-02-12 NOTE — CARE COORDINATION
Chart screened by CM for d/c  planning.  On 2/11 [facility] evaluated pt but declined her d/t payor source not being accepted.  Pt does not want to return to a SNF for rehab.  CM contacted [facility] and they do accept pt's insurance therefore CM has sent a referral there for review.  Pt has been accepted to [facility].  They will start insurance pre-cert today.  CM spent approximately 20 minutes in pt's room trying to reassure her that this facility will not \"take her disability check.\"  The liaison from [facility] will also call to help re-assure pt.  CM will continue to follow.  LOS = 4 days

## 2025-02-13 PROCEDURE — 1100000000 HC RM PRIVATE

## 2025-02-13 PROCEDURE — 2500000003 HC RX 250 WO HCPCS: Performed by: HOSPITALIST

## 2025-02-13 PROCEDURE — 6370000000 HC RX 637 (ALT 250 FOR IP): Performed by: INTERNAL MEDICINE

## 2025-02-13 PROCEDURE — 6370000000 HC RX 637 (ALT 250 FOR IP): Performed by: STUDENT IN AN ORGANIZED HEALTH CARE EDUCATION/TRAINING PROGRAM

## 2025-02-13 PROCEDURE — 6370000000 HC RX 637 (ALT 250 FOR IP): Performed by: HOSPITALIST

## 2025-02-13 PROCEDURE — 97535 SELF CARE MNGMENT TRAINING: CPT

## 2025-02-13 PROCEDURE — 6360000002 HC RX W HCPCS: Performed by: HOSPITALIST

## 2025-02-13 PROCEDURE — 97530 THERAPEUTIC ACTIVITIES: CPT

## 2025-02-13 RX ADMIN — SODIUM CHLORIDE, PRESERVATIVE FREE 10 ML: 5 INJECTION INTRAVENOUS at 19:59

## 2025-02-13 RX ADMIN — PREGABALIN 100 MG: 100 CAPSULE ORAL at 08:14

## 2025-02-13 RX ADMIN — DULOXETINE HYDROCHLORIDE 30 MG: 30 CAPSULE, DELAYED RELEASE ORAL at 08:14

## 2025-02-13 RX ADMIN — PREGABALIN 100 MG: 100 CAPSULE ORAL at 14:36

## 2025-02-13 RX ADMIN — TRAZODONE HYDROCHLORIDE 150 MG: 50 TABLET ORAL at 19:58

## 2025-02-13 RX ADMIN — PREGABALIN 100 MG: 100 CAPSULE ORAL at 19:58

## 2025-02-13 RX ADMIN — SODIUM CHLORIDE, PRESERVATIVE FREE 10 ML: 5 INJECTION INTRAVENOUS at 08:15

## 2025-02-13 RX ADMIN — POLYETHYLENE GLYCOL 3350 17 G: 17 POWDER, FOR SOLUTION ORAL at 19:58

## 2025-02-13 RX ADMIN — ENOXAPARIN SODIUM 40 MG: 100 INJECTION SUBCUTANEOUS at 08:14

## 2025-02-13 ASSESSMENT — PAIN SCALES - GENERAL: PAINLEVEL_OUTOF10: 0

## 2025-02-13 NOTE — PLAN OF CARE
Problem: Pain  Goal: Verbalizes/displays adequate comfort level or baseline comfort level  Outcome: Progressing     Problem: Skin/Tissue Integrity  Goal: Skin integrity remains intact  Description: 1.  Monitor for areas of redness and/or skin breakdown  2.  Assess vascular access sites hourly  3.  Every 4-6 hours minimum:  Change oxygen saturation probe site  4.  Every 4-6 hours:  If on nasal continuous positive airway pressure, respiratory therapy assess nares and determine need for appliance change or resting period  Outcome: Progressing     Problem: Safety - Adult  Goal: Free from fall injury  Outcome: Progressing

## 2025-02-14 PROCEDURE — 1100000000 HC RM PRIVATE

## 2025-02-14 PROCEDURE — 6370000000 HC RX 637 (ALT 250 FOR IP): Performed by: HOSPITALIST

## 2025-02-14 PROCEDURE — 97530 THERAPEUTIC ACTIVITIES: CPT

## 2025-02-14 PROCEDURE — 6370000000 HC RX 637 (ALT 250 FOR IP): Performed by: INTERNAL MEDICINE

## 2025-02-14 PROCEDURE — 6370000000 HC RX 637 (ALT 250 FOR IP): Performed by: STUDENT IN AN ORGANIZED HEALTH CARE EDUCATION/TRAINING PROGRAM

## 2025-02-14 PROCEDURE — 97535 SELF CARE MNGMENT TRAINING: CPT

## 2025-02-14 PROCEDURE — 6360000002 HC RX W HCPCS: Performed by: HOSPITALIST

## 2025-02-14 RX ADMIN — PREGABALIN 100 MG: 100 CAPSULE ORAL at 08:02

## 2025-02-14 RX ADMIN — PREGABALIN 100 MG: 100 CAPSULE ORAL at 19:50

## 2025-02-14 RX ADMIN — HYDROCODONE BITARTRATE AND ACETAMINOPHEN 1 TABLET: 7.5; 325 TABLET ORAL at 03:39

## 2025-02-14 RX ADMIN — ENOXAPARIN SODIUM 40 MG: 100 INJECTION SUBCUTANEOUS at 08:03

## 2025-02-14 RX ADMIN — TRAZODONE HYDROCHLORIDE 150 MG: 50 TABLET ORAL at 19:50

## 2025-02-14 RX ADMIN — DOCUSATE SODIUM 50 MG AND SENNOSIDES 8.6 MG 1 TABLET: 8.6; 5 TABLET, FILM COATED ORAL at 08:02

## 2025-02-14 RX ADMIN — PREGABALIN 100 MG: 100 CAPSULE ORAL at 13:51

## 2025-02-14 RX ADMIN — DULOXETINE HYDROCHLORIDE 30 MG: 30 CAPSULE, DELAYED RELEASE ORAL at 08:02

## 2025-02-14 ASSESSMENT — PAIN DESCRIPTION - DESCRIPTORS: DESCRIPTORS: BURNING

## 2025-02-14 ASSESSMENT — PAIN SCALES - GENERAL
PAINLEVEL_OUTOF10: 0
PAINLEVEL_OUTOF10: 0
PAINLEVEL_OUTOF10: 7

## 2025-02-14 ASSESSMENT — PAIN DESCRIPTION - ORIENTATION: ORIENTATION: RIGHT;LEFT

## 2025-02-14 ASSESSMENT — PAIN DESCRIPTION - LOCATION: LOCATION: LEG;ARM

## 2025-02-15 PROCEDURE — 6370000000 HC RX 637 (ALT 250 FOR IP): Performed by: INTERNAL MEDICINE

## 2025-02-15 PROCEDURE — 1100000000 HC RM PRIVATE

## 2025-02-15 PROCEDURE — 6360000002 HC RX W HCPCS: Performed by: HOSPITALIST

## 2025-02-15 PROCEDURE — 6370000000 HC RX 637 (ALT 250 FOR IP): Performed by: HOSPITALIST

## 2025-02-15 PROCEDURE — 6370000000 HC RX 637 (ALT 250 FOR IP): Performed by: STUDENT IN AN ORGANIZED HEALTH CARE EDUCATION/TRAINING PROGRAM

## 2025-02-15 RX ADMIN — PREGABALIN 100 MG: 100 CAPSULE ORAL at 19:50

## 2025-02-15 RX ADMIN — HYDROCODONE BITARTRATE AND ACETAMINOPHEN 1 TABLET: 7.5; 325 TABLET ORAL at 06:33

## 2025-02-15 RX ADMIN — DULOXETINE HYDROCHLORIDE 30 MG: 30 CAPSULE, DELAYED RELEASE ORAL at 08:03

## 2025-02-15 RX ADMIN — PREGABALIN 100 MG: 100 CAPSULE ORAL at 08:03

## 2025-02-15 RX ADMIN — ENOXAPARIN SODIUM 40 MG: 100 INJECTION SUBCUTANEOUS at 08:03

## 2025-02-15 RX ADMIN — TRAZODONE HYDROCHLORIDE 150 MG: 50 TABLET ORAL at 19:50

## 2025-02-15 RX ADMIN — PREGABALIN 100 MG: 100 CAPSULE ORAL at 14:04

## 2025-02-15 ASSESSMENT — PAIN SCALES - GENERAL
PAINLEVEL_OUTOF10: 6
PAINLEVEL_OUTOF10: 0

## 2025-02-15 NOTE — PLAN OF CARE
Problem: Pain  Goal: Verbalizes/displays adequate comfort level or baseline comfort level  2/15/2025 0736 by Viky Escalante, RN  Outcome: Progressing  2/14/2025 2249 by Herbie Pinon, RN  Outcome: Progressing     Problem: Skin/Tissue Integrity  Goal: Skin integrity remains intact  Description: 1.  Monitor for areas of redness and/or skin breakdown  2.  Assess vascular access sites hourly  3.  Every 4-6 hours minimum:  Change oxygen saturation probe site  4.  Every 4-6 hours:  If on nasal continuous positive airway pressure, respiratory therapy assess nares and determine need for appliance change or resting period  2/15/2025 0736 by Viky Escalante, RN  Outcome: Progressing  2/14/2025 2249 by Herbie Pinon, RN  Outcome: Progressing     Problem: Safety - Adult  Goal: Free from fall injury  2/14/2025 2249 by Herbie Pinon, RN  Outcome: Progressing

## 2025-02-16 PROCEDURE — 6370000000 HC RX 637 (ALT 250 FOR IP): Performed by: INTERNAL MEDICINE

## 2025-02-16 PROCEDURE — 1100000000 HC RM PRIVATE

## 2025-02-16 PROCEDURE — 6370000000 HC RX 637 (ALT 250 FOR IP): Performed by: STUDENT IN AN ORGANIZED HEALTH CARE EDUCATION/TRAINING PROGRAM

## 2025-02-16 PROCEDURE — 6370000000 HC RX 637 (ALT 250 FOR IP): Performed by: HOSPITALIST

## 2025-02-16 PROCEDURE — 6360000002 HC RX W HCPCS: Performed by: HOSPITALIST

## 2025-02-16 RX ORDER — PANTOPRAZOLE SODIUM 40 MG/1
40 TABLET, DELAYED RELEASE ORAL ONCE
Status: COMPLETED | OUTPATIENT
Start: 2025-02-16 | End: 2025-02-16

## 2025-02-16 RX ADMIN — PREGABALIN 100 MG: 100 CAPSULE ORAL at 15:20

## 2025-02-16 RX ADMIN — PREGABALIN 100 MG: 100 CAPSULE ORAL at 20:25

## 2025-02-16 RX ADMIN — ENOXAPARIN SODIUM 40 MG: 100 INJECTION SUBCUTANEOUS at 08:05

## 2025-02-16 RX ADMIN — PANTOPRAZOLE SODIUM 40 MG: 40 TABLET, DELAYED RELEASE ORAL at 03:52

## 2025-02-16 RX ADMIN — PREGABALIN 100 MG: 100 CAPSULE ORAL at 08:05

## 2025-02-16 RX ADMIN — ONDANSETRON 4 MG: 4 TABLET, ORALLY DISINTEGRATING ORAL at 03:52

## 2025-02-16 RX ADMIN — TRAZODONE HYDROCHLORIDE 150 MG: 50 TABLET ORAL at 20:25

## 2025-02-16 RX ADMIN — DULOXETINE HYDROCHLORIDE 30 MG: 30 CAPSULE, DELAYED RELEASE ORAL at 08:05

## 2025-02-16 ASSESSMENT — PAIN SCALES - GENERAL
PAINLEVEL_OUTOF10: 0

## 2025-02-16 NOTE — PLAN OF CARE
Problem: Pain  Goal: Verbalizes/displays adequate comfort level or baseline comfort level  2/15/2025 2255 by Herbie Pinon, RN  Outcome: Progressing     Problem: Skin/Tissue Integrity  Goal: Skin integrity remains intact  Description: 1.  Monitor for areas of redness and/or skin breakdown  2.  Assess vascular access sites hourly  3.  Every 4-6 hours minimum:  Change oxygen saturation probe site  4.  Every 4-6 hours:  If on nasal continuous positive airway pressure, respiratory therapy assess nares and determine need for appliance change or resting period  2/16/2025 1111 by Erika Marie, RN  Outcome: Progressing  Flowsheets (Taken 2/16/2025 0805)  Skin Integrity Remains Intact: Monitor for areas of redness and/or skin breakdown  2/15/2025 2255 by Herbie Pinon, RN  Outcome: Progressing     Problem: Safety - Adult  Goal: Free from fall injury  2/16/2025 1111 by Erika Marie, RN  Outcome: Progressing  2/15/2025 2255 by Herbie Pinon, RN  Outcome: Progressing

## 2025-02-17 PROCEDURE — 1100000000 HC RM PRIVATE

## 2025-02-17 PROCEDURE — 6370000000 HC RX 637 (ALT 250 FOR IP): Performed by: INTERNAL MEDICINE

## 2025-02-17 PROCEDURE — 6370000000 HC RX 637 (ALT 250 FOR IP): Performed by: STUDENT IN AN ORGANIZED HEALTH CARE EDUCATION/TRAINING PROGRAM

## 2025-02-17 PROCEDURE — 6360000002 HC RX W HCPCS: Performed by: HOSPITALIST

## 2025-02-17 PROCEDURE — 97530 THERAPEUTIC ACTIVITIES: CPT

## 2025-02-17 PROCEDURE — 6370000000 HC RX 637 (ALT 250 FOR IP): Performed by: HOSPITALIST

## 2025-02-17 RX ORDER — LOPERAMIDE HYDROCHLORIDE 2 MG/1
4 CAPSULE ORAL 4 TIMES DAILY PRN
Status: DISCONTINUED | OUTPATIENT
Start: 2025-02-17 | End: 2025-02-19 | Stop reason: HOSPADM

## 2025-02-17 RX ORDER — MAGNESIUM HYDROXIDE/ALUMINUM HYDROXICE/SIMETHICONE 120; 1200; 1200 MG/30ML; MG/30ML; MG/30ML
30 SUSPENSION ORAL EVERY 6 HOURS PRN
Status: DISCONTINUED | OUTPATIENT
Start: 2025-02-17 | End: 2025-02-19 | Stop reason: HOSPADM

## 2025-02-17 RX ADMIN — ENOXAPARIN SODIUM 40 MG: 100 INJECTION SUBCUTANEOUS at 08:50

## 2025-02-17 RX ADMIN — PREGABALIN 100 MG: 100 CAPSULE ORAL at 08:47

## 2025-02-17 RX ADMIN — DULOXETINE HYDROCHLORIDE 30 MG: 30 CAPSULE, DELAYED RELEASE ORAL at 08:51

## 2025-02-17 RX ADMIN — ALUMINUM HYDROXIDE, MAGNESIUM HYDROXIDE, AND SIMETHICONE 30 ML: 200; 200; 20 SUSPENSION ORAL at 09:20

## 2025-02-17 RX ADMIN — LOPERAMIDE HYDROCHLORIDE 4 MG: 2 CAPSULE ORAL at 09:20

## 2025-02-17 RX ADMIN — PREGABALIN 100 MG: 100 CAPSULE ORAL at 13:11

## 2025-02-17 RX ADMIN — PREGABALIN 100 MG: 100 CAPSULE ORAL at 20:08

## 2025-02-17 RX ADMIN — DOCUSATE SODIUM 50 MG AND SENNOSIDES 8.6 MG 1 TABLET: 8.6; 5 TABLET, FILM COATED ORAL at 13:11

## 2025-02-17 RX ADMIN — TRAZODONE HYDROCHLORIDE 150 MG: 50 TABLET ORAL at 20:08

## 2025-02-17 RX ADMIN — POLYETHYLENE GLYCOL 3350 17 G: 17 POWDER, FOR SOLUTION ORAL at 13:12

## 2025-02-17 ASSESSMENT — PAIN SCALES - GENERAL: PAINLEVEL_OUTOF10: 0

## 2025-02-17 NOTE — PLAN OF CARE
Patient has remained A&O x 4.  Denies pain, VS have remained stable.    -Maalox x1   -pt given imodium for c/o diarrhea after pt request. Pt states that she has been having Bms daily, but after talking to pt more, she has not had a good BM in a few days.  Patient states that her stomach is a little uncomfortable.  Miralax and senna given after lunch for possible constipation. Pt has since had a soft BM and overall feels better.     -has ambulated 5-6 times in the hallway with therapy and staff.  Encouraged to call before getting out of bed/chair without assistance. Alarm kept on, pt has been more agreeable with keeping on.     Patient rounded on hourly by myself or patient assistant and encouraged to call with any needs. No signs of distress noted. Bed low, locked, call bell within reach.   BSSR given to DAFNE Horan RN    Problem: Pain  Goal: Verbalizes/displays adequate comfort level or baseline comfort level  Outcome: Progressing     Problem: Skin/Tissue Integrity  Goal: Skin integrity remains intact  Description: 1.  Monitor for areas of redness and/or skin breakdown  2.  Assess vascular access sites hourly  3.  Every 4-6 hours minimum:  Change oxygen saturation probe site  4.  Every 4-6 hours:  If on nasal continuous positive airway pressure, respiratory therapy assess nares and determine need for appliance change or resting period  Outcome: Progressing     Problem: Safety - Adult  Goal: Free from fall injury  Outcome: Progressing

## 2025-02-17 NOTE — CARE COORDINATION
RN CM in room to speak to patient, she is sitting up in chair, alert and oriented. Aware of therapy recommendation for [facility]. LVM for [facility] to see how the insurance precert is going and will update patient when new information obtained.

## 2025-02-18 LAB
Lab: NORMAL
Lab: NORMAL
REFERENCE LAB: NORMAL

## 2025-02-18 PROCEDURE — 1100000000 HC RM PRIVATE

## 2025-02-18 PROCEDURE — 97530 THERAPEUTIC ACTIVITIES: CPT

## 2025-02-18 PROCEDURE — 6360000002 HC RX W HCPCS: Performed by: HOSPITALIST

## 2025-02-18 PROCEDURE — 6370000000 HC RX 637 (ALT 250 FOR IP): Performed by: INTERNAL MEDICINE

## 2025-02-18 PROCEDURE — 97164 PT RE-EVAL EST PLAN CARE: CPT

## 2025-02-18 PROCEDURE — 6370000000 HC RX 637 (ALT 250 FOR IP): Performed by: STUDENT IN AN ORGANIZED HEALTH CARE EDUCATION/TRAINING PROGRAM

## 2025-02-18 PROCEDURE — 6370000000 HC RX 637 (ALT 250 FOR IP): Performed by: HOSPITALIST

## 2025-02-18 RX ORDER — DULOXETIN HYDROCHLORIDE 30 MG/1
30 CAPSULE, DELAYED RELEASE ORAL DAILY
Qty: 30 CAPSULE | Refills: 2 | Status: SHIPPED | OUTPATIENT
Start: 2025-02-19

## 2025-02-18 RX ORDER — PREGABALIN 100 MG/1
100 CAPSULE ORAL 3 TIMES DAILY
Qty: 90 CAPSULE | Refills: 1 | Status: SHIPPED | OUTPATIENT
Start: 2025-02-18 | End: 2025-04-19

## 2025-02-18 RX ORDER — HYDROXYZINE PAMOATE 25 MG/1
50 CAPSULE ORAL EVERY 6 HOURS PRN
Status: DISCONTINUED | OUTPATIENT
Start: 2025-02-18 | End: 2025-02-19 | Stop reason: HOSPADM

## 2025-02-18 RX ADMIN — TRAZODONE HYDROCHLORIDE 150 MG: 50 TABLET ORAL at 20:50

## 2025-02-18 RX ADMIN — PREGABALIN 100 MG: 100 CAPSULE ORAL at 20:50

## 2025-02-18 RX ADMIN — ENOXAPARIN SODIUM 40 MG: 100 INJECTION SUBCUTANEOUS at 09:17

## 2025-02-18 RX ADMIN — HYDROXYZINE PAMOATE 50 MG: 25 CAPSULE ORAL at 16:41

## 2025-02-18 RX ADMIN — PREGABALIN 100 MG: 100 CAPSULE ORAL at 09:16

## 2025-02-18 RX ADMIN — DULOXETINE HYDROCHLORIDE 30 MG: 30 CAPSULE, DELAYED RELEASE ORAL at 09:16

## 2025-02-18 RX ADMIN — PREGABALIN 100 MG: 100 CAPSULE ORAL at 14:02

## 2025-02-18 NOTE — CARE COORDINATION
RN CM met with the patient and informed her [facility] is unable to accept her.  She selected [facility]. A referral was sent to [facility] and is pending review

## 2025-02-18 NOTE — PLAN OF CARE
Problem: Pain  Goal: Verbalizes/displays adequate comfort level or baseline comfort level  2/18/2025 0956 by Yesenia Amezquita RN  Outcome: Progressing  Flowsheets (Taken 2/18/2025 0756)  Verbalizes/displays adequate comfort level or baseline comfort level: Encourage patient to monitor pain and request assistance  2/17/2025 2237 by Marline Way RN  Outcome: Progressing  Flowsheets (Taken 2/17/2025 2008)  Verbalizes/displays adequate comfort level or baseline comfort level:   Encourage patient to monitor pain and request assistance   Assess pain using appropriate pain scale   Administer analgesics based on type and severity of pain and evaluate response     Problem: Skin/Tissue Integrity  Goal: Skin integrity remains intact  Description: 1.  Monitor for areas of redness and/or skin breakdown  2.  Assess vascular access sites hourly  3.  Every 4-6 hours minimum:  Change oxygen saturation probe site  4.  Every 4-6 hours:  If on nasal continuous positive airway pressure, respiratory therapy assess nares and determine need for appliance change or resting period  2/18/2025 0956 by Yesenia Amezquita RN  Outcome: Progressing  Flowsheets (Taken 2/18/2025 0756)  Skin Integrity Remains Intact: Monitor for areas of redness and/or skin breakdown  2/17/2025 2237 by Marline Way RN  Outcome: Progressing  Flowsheets (Taken 2/17/2025 2008)  Skin Integrity Remains Intact: Monitor for areas of redness and/or skin breakdown     Problem: Safety - Adult  Goal: Free from fall injury  2/18/2025 0956 by Yesenia Amezquita RN  Outcome: Progressing  2/17/2025 2237 by Marline Way RN  Outcome: Progressing

## 2025-02-18 NOTE — CARE COORDINATION
Discharge planning was discussed with the Interdisciplinary Team. Home health services and a rolling walker are now being recommended. The home health orders and rolling walker orders were confirmed with read back with the attending MD.

## 2025-02-18 NOTE — CARE COORDINATION
Message received from the liaison with [facility]. The liaison stated she may not have an answer regarding the status of the referral until after normal business hours.    RN CM met with the patient and discussed home health. She is interested in [facility]. Orders and a referral were sent to [facility] and are pending review.    Update 1505: Voicemail received from [facility]. They are unable to accept the patient for services.    1607: RN CM spoke with [facility]. They are out of network with [employer].    1609: RN CM spoke with [facility] and confirmed they can accept the patient for services. The rolling walker was delivered to the patient and adjusted to her height. The [facility] Patient Consent and Acknowlegement and Plan of Service form was delivered and explained to the patient. She verbalized understanding of the information and signed the form. She was given a copy of the form and the original was faxed back to the [facility].     1624: Message received from [facility] reporting they are out of network with the patient's insurance. She is in agreement to use [facility]. Orders and a referral were sent [facility] and are pending review.    1639: RN CM spoke with [facility]. They have not yet received the referral. The referral was re-faxed to [facility] and is pending review.    1705: Discharge planning was discussed with the primary nurse and attending MD. The home health orders were updated and confirmed with the physician. The home health order was sent to [facility].

## 2025-02-18 NOTE — CARE COORDINATION
RN PIERRE met with the patient at the bedside and discussed the new recommendation for home health and  rolling walker. The patient was provided with a list of home health agencies and their quality metrics. She would like to use [facility]. A referral and orders were sent to the home health agency and are pending review. She is in agreement to use [facility] for DME services. A referral and orders were sent to [facility] and are pending review.

## 2025-02-18 NOTE — CARE COORDINATION
RN CM spoke with the liaison at [facility] and was informed the patient is still pending insurance pre-certification.

## 2025-02-19 VITALS
DIASTOLIC BLOOD PRESSURE: 81 MMHG | WEIGHT: 182.1 LBS | SYSTOLIC BLOOD PRESSURE: 133 MMHG | TEMPERATURE: 97.3 F | BODY MASS INDEX: 28.58 KG/M2 | HEIGHT: 67 IN | OXYGEN SATURATION: 98 % | RESPIRATION RATE: 20 BRPM | HEART RATE: 88 BPM

## 2025-02-19 PROBLEM — G61.0: Status: ACTIVE | Noted: 2025-02-08

## 2025-02-19 PROCEDURE — 6370000000 HC RX 637 (ALT 250 FOR IP): Performed by: INTERNAL MEDICINE

## 2025-02-19 PROCEDURE — 6360000002 HC RX W HCPCS: Performed by: HOSPITALIST

## 2025-02-19 PROCEDURE — 97530 THERAPEUTIC ACTIVITIES: CPT

## 2025-02-19 PROCEDURE — 6370000000 HC RX 637 (ALT 250 FOR IP): Performed by: STUDENT IN AN ORGANIZED HEALTH CARE EDUCATION/TRAINING PROGRAM

## 2025-02-19 RX ADMIN — ENOXAPARIN SODIUM 40 MG: 100 INJECTION SUBCUTANEOUS at 09:02

## 2025-02-19 RX ADMIN — DULOXETINE HYDROCHLORIDE 30 MG: 30 CAPSULE, DELAYED RELEASE ORAL at 09:02

## 2025-02-19 RX ADMIN — PREGABALIN 100 MG: 100 CAPSULE ORAL at 13:33

## 2025-02-19 RX ADMIN — PREGABALIN 100 MG: 100 CAPSULE ORAL at 09:02

## 2025-02-19 NOTE — DISCHARGE SUMMARY
Hospitalist Discharge Summary   Admit Date:  2025  5:58 PM   DC Note date: 2025  Name:  Alva Kramer   Age:  54 y.o.  Sex:  female  :  1970   MRN:  [id number]   Room:  [facility]/  PCP:  No primary care provider on file.    Presenting Complaint: generalized pain     Initial Admission Diagnosis: Burning sensation [R20.8]  Generalized pain [R52]     Problem List for this Hospitalization (present on admission):    Principal Problem:    Neuropathy  Active Problems:    Cervical myelopathy (HCC)    NPH (normal pressure hydrocephalus) (HCC)    Status post ventriculo-peritoneal shunt placement    Housing instability  Resolved Problems:    * No resolved hospital problems. *      Hospital Course:  Alva Kramer is a 54 y.o. female with medical history of breast cancer, cervical myelopathy, transverse myelitis, multiple sclerosis not sure if she is on any medications, dyslipidemia presented to emergency room with chief complaint of diffuse burning sensation all over the body started couple of days ago, continued to get worse. Patient denies any localized weakness, patient reports history of chronic left upper and lower extremity heaviness which has not changed much. Evaluated in the emergency room, urinalysis did not show any UTI, CT chest was done did not show any infiltrates. CT head did not show any acute intracranial changes, emergency room physician contacted neurologist who recommended obtaining MRI first, if MRI shows any new lesions recommended steroids. Patient has  shunt for normal pressure hydrocephalus which need to be reprogrammed after MRI, MRI tech recommended until  is available to wait for MRI to avoid further complications. Patient denied any fever, chills, nausea, vomiting.       Can not complete MRI due to  Sh[facility] and Diana being her Neurologist. Per Dr. [facility]s Neu[facility], MRI is not an urgent matter at this time.   Not a MS flare per Neurologist.  Continue cymbalta and lyrica for 
tablet by mouth daily as needed for Constipation  Qty: 30 tablet, Refills: 0      traZODone (DESYREL) 150 MG tablet Take 1 tablet by mouth nightly      acetaminophen (TYLENOL) 325 MG tablet Take 2 tablets by mouth every 6 hours as needed for Pain      famotidine (PEPCID) 20 MG tablet Take 1 tablet by mouth 2 times daily      melatonin 3 MG TABS tablet Take 5 mg by mouth nightly as needed           STOP taking these medications       gabapentin (NEURONTIN) 300 MG capsule Comments:   Reason for Stopping:         oxyBUTYnin (DITROPAN-XL) 10 MG extended release tablet Comments:   Reason for Stopping:         FLUoxetine (PROZAC) 20 MG capsule Comments:   Reason for Stopping:               Some of the medications may be marked as \"stop taking\" by the system; but in reality patient or family reported already being off these meds; defer to outpatient/prescribing providers.      Procedures done this admission:  * No surgery found *    Consults this admission:  IP CONSULT TO NEUROLOGY  IP CONSULT TO PHYSICAL MEDICINE REHAB  IP CONSULT TO CASE MANAGEMENT  IP CONSULT TO PHYSICAL MEDICINE REHAB  IP CONSULT HOME HEALTH    Consultants will arrange their own follow ups, if applicable.    Echocardiogram results:  No results found for this or any previous visit.      Diagnostic Imaging/Tests:   CT CHEST W CONTRAST    Result Date: 2/8/2025  1. Mild cardiomegaly. 2. Ascending aortic aneurysm at 40 mm. 3. Right breast implant. 4. Hepatomegaly. Please correlate with LFTs. 5.  shunt tubing incompletely imaged [facility] Citation: Information used in this report was referenced from the following. Guidelines for Management of Incidental Pulmonary Nodules Detected on CT Images: From the Fleischner Society 2017\" Radiology. 2017 Jul;284(1):228-243. doi: 10.1148/radiol.0975686416. Epub 2017 Feb 23. Electronically signed by Jillian Yan    XR CHEST PORTABLE    Result Date: 2/8/2025  Increased hazy density overlying the right chest field,

## 2025-02-19 NOTE — PROGRESS NOTES
Hospitalist Progress Note   Admit Date:  2025  5:58 PM   Name:  Alva Kramer   Age:  54 y.o.  Sex:  female  :  1970   MRN:  [id number]   Room:  [facility]/    Presenting/Chief Complaint: generalized pain     Reason(s) for Admission: Burning sensation [R20.8]  Generalized pain [R52]     Hospital Course:   Alva Kramer is a 54 y.o. female with medical history of breast cancer, cervical myelopathy, transverse myelitis, multiple sclerosis not sure if she is on any medications, dyslipidemia presented to emergency room with chief complaint of diffuse burning sensation all over the body started couple of days ago, continued to get worse. Patient denies any localized weakness, patient reports history of chronic left upper and lower extremity heaviness which has not changed much. Evaluated in the emergency room, urinalysis did not show any UTI, CT chest was done did not show any infiltrates. CT head did not show any acute intracranial changes, emergency room physician contacted neurologist who recommended obtaining MRI first, if MRI shows any new lesions recommended steroids. Patient has  shunt for normal pressure hydrocephalus which need to be reprogrammed after MRI, MRI tech recommended until  is available to wait for MRI to avoid further complications. Patient denies any fever, chills, nausea, vomiting.       Subjective & 24hr Events:   Mrs. Kramer is seen this morning in some distress.  She reports that she is having pain all over.  She was given morphine yesterday and reports that it helped.  She has for 8 mg and I said no ma'am we can do 4 mg of this will be a one-time dose until neurology sees her.  She also asked for IV steroids to  To her that we are try to get the MRI before the steroids and then neurology is rounding and so hopefully they will see her soon.  There was some discrepancy in her home medications and I did correct those today.  So the medications on her list she actually has not been 
       Hospitalist Progress Note   Admit Date:  2025  5:58 PM   Name:  Alva Kramer   Age:  54 y.o.  Sex:  female  :  1970   MRN:  [id number]   Room:  [facility]/    Presenting/Chief Complaint: generalized pain     Reason(s) for Admission: Burning sensation [R20.8]  Generalized pain [R52]     Hospital Course:   Alva Kramer is a 54 y.o. female with medical history of breast cancer, cervical myelopathy, transverse myelitis, multiple sclerosis not sure if she is on any medications, dyslipidemia presented to emergency room with chief complaint of diffuse burning sensation all over the body started couple of days ago, continued to get worse. Patient denies any localized weakness, patient reports history of chronic left upper and lower extremity heaviness which has not changed much. Evaluated in the emergency room, urinalysis did not show any UTI, CT chest was done did not show any infiltrates. CT head did not show any acute intracranial changes, emergency room physician contacted neurologist who recommended obtaining MRI first, if MRI shows any new lesions recommended steroids. Patient has  shunt for normal pressure hydrocephalus which need to be reprogrammed after MRI, MRI tech recommended until  is available to wait for MRI to avoid further complications. Patient denies any fever, chills, nausea, vomiting.       Subjective & 24hr Events:   Ambulating with walker.  Seen in hallway with therapy.  No complaints today and feels fine.    Medically ready for discharge; awaiting placement.    Assessment & Plan:   Neuropathy:  --Not a MS flare per Neurologist  -Can not complete MRI due to  [facility] [employer] [facility] being her Neurologist. Per Dr. Griffiths Neurology, MRI is not an urgent matter at this time.  - cont Lyrica      Anxiety:   stopped Prozac and Cymbalta several months PTA but continue Cymbalta as this can be used for neuropathy     Insomnia:   -Continue on trazodone, melatonin.     Normal pressure 
       Hospitalist Progress Note   Admit Date:  2025  5:58 PM   Name:  Alva Kramer   Age:  54 y.o.  Sex:  female  :  1970   MRN:  [id number]   Room:  [facility]/    Presenting/Chief Complaint: generalized pain     Reason(s) for Admission: Burning sensation [R20.8]  Generalized pain [R52]     Hospital Course:   Alva Kramer is a 54 y.o. female with medical history of breast cancer, cervical myelopathy, transverse myelitis, multiple sclerosis not sure if she is on any medications, dyslipidemia presented to emergency room with chief complaint of diffuse burning sensation all over the body started couple of days ago, continued to get worse. Patient denies any localized weakness, patient reports history of chronic left upper and lower extremity heaviness which has not changed much. Evaluated in the emergency room, urinalysis did not show any UTI, CT chest was done did not show any infiltrates. CT head did not show any acute intracranial changes, emergency room physician contacted neurologist who recommended obtaining MRI first, if MRI shows any new lesions recommended steroids. Patient has  shunt for normal pressure hydrocephalus which need to be reprogrammed after MRI, MRI tech recommended until  is available to wait for MRI to avoid further complications. Patient denies any fever, chills, nausea, vomiting.       Subjective & 24hr Events:   Pt continues to progress with therapy.  She has no complaints today except that it is taking a long time to get out of the hospital.    Medically ready for discharge; awaiting placement.    Assessment & Plan:   Neuropathy:  --Not a MS flare per Neurologist  -Can not complete MRI due to  [employer] being her Neurologist. Per Dr. Griffiths Neurology, MRI is not an urgent matter at this time.  - cont Lyrica      Anxiety:   stopped Prozac and Cymbalta several months PTA but continue Cymbalta as this can be used for neuropathy     Insomnia:   -Continue on trazodone, 
       Hospitalist Progress Note   Admit Date:  2025  5:58 PM   Name:  Alva Kramer   Age:  54 y.o.  Sex:  female  :  1970   MRN:  [id number]   Room:      Presenting/Chief Complaint: generalized pain     Reason(s) for Admission: Burning sensation [R20.8]  Generalized pain [R52]     Hospital Course:   Alva Kramer is a 54 y.o. female with medical history of breast cancer, cervical myelopathy, transverse myelitis, multiple sclerosis not sure if she is on any medications, dyslipidemia presented to emergency room with chief complaint of diffuse burning sensation all over the body started couple of days ago, continued to get worse. Patient denies any localized weakness, patient reports history of chronic left upper and lower extremity heaviness which has not changed much. Evaluated in the emergency room, urinalysis did not show any UTI, CT chest was done did not show any infiltrates. CT head did not show any acute intracranial changes, emergency room physician contacted neurologist who recommended obtaining MRI first, if MRI shows any new lesions recommended steroids. Patient has  shunt for normal pressure hydrocephalus which need to be reprogrammed after MRI, MRI tech recommended until  is available to wait for MRI to avoid further complications. Patient denies any fever, chills, nausea, vomiting.       Subjective & 24hr Events:   25 - Pt remains stable without new complaints when I went in there.  But then she told nursing she had gas and a loose stool without mention of abd pain or other symptoms.      Medically ready for discharge; awaiting placement since .    Assessment & Plan:   Neuropathy:  --Not a MS flare per Neurologist  -Can not complete MRI due to  [employer] being her Neurologist. Per Dr. Griffiths Neurology, MRI is not an urgent matter at this time.  - cont Lyrica     Gas, loose stool  -imodium and maalox      Intermittent hypotension  -appears to be asymptomatic.  Runs 
       Hospitalist Progress Note   Admit Date:  2025  5:58 PM   Name:  Alva Kramer   Age:  54 y.o.  Sex:  female  :  1970   MRN:  [id number]   Room:      Presenting/Chief Complaint: generalized pain     Reason(s) for Admission: Burning sensation [R20.8]  Generalized pain [R52]     Hospital Course:   Alva Kramer is a 54 y.o. female with medical history of breast cancer, cervical myelopathy, transverse myelitis, multiple sclerosis not sure if she is on any medications, dyslipidemia presented to emergency room with chief complaint of diffuse burning sensation all over the body started couple of days ago, continued to get worse. Patient denies any localized weakness, patient reports history of chronic left upper and lower extremity heaviness which has not changed much. Evaluated in the emergency room, urinalysis did not show any UTI, CT chest was done did not show any infiltrates. CT head did not show any acute intracranial changes, emergency room physician contacted neurologist who recommended obtaining MRI first, if MRI shows any new lesions recommended steroids. Patient has  shunt for normal pressure hydrocephalus which need to be reprogrammed after MRI, MRI tech recommended until  is available to wait for MRI to avoid further complications. Patient denies any fever, chills, nausea, vomiting.       Subjective & 24hr Events:   Pt continues to progress with therapy.  She has no complaints today    Medically ready for discharge; awaiting placement.    Assessment & Plan:   Neuropathy:  --Not a MS flare per Neurologist  -Can not complete MRI due to  [employer] [employer] being her Neurologist. Per Dr. Griffiths Neurology, MRI is not an urgent matter at this time.  - cont Lyrica      Anxiety:   stopped Prozac and Cymbalta several months PTA but continue Cymbalta as this can be used for neuropathy     Insomnia:   -Continue on trazodone, melatonin.     Normal pressure hydrocephalus  - shunt in 
       Hospitalist Progress Note   Admit Date:  2025  5:58 PM   Name:  Alva Kramer   Age:  54 y.o.  Sex:  female  :  1970   MRN:  [id number]   Room:  /    Presenting/Chief Complaint: generalized pain     Reason(s) for Admission: Burning sensation [R20.8]  Generalized pain [R52]     Hospital Course:   Alva Kramer is a 54 y.o. female with medical history of breast cancer, cervical myelopathy, transverse myelitis, multiple sclerosis not sure if she is on any medications, dyslipidemia presented to emergency room with chief complaint of diffuse burning sensation all over the body started couple of days ago, continued to get worse. Patient denies any localized weakness, patient reports history of chronic left upper and lower extremity heaviness which has not changed much. Evaluated in the emergency room, urinalysis did not show any UTI, CT chest was done did not show any infiltrates. CT head did not show any acute intracranial changes, emergency room physician contacted neurologist who recommended obtaining MRI first, if MRI shows any new lesions recommended steroids. Patient has  shunt for normal pressure hydrocephalus which need to be reprogrammed after MRI, MRI tech recommended until  is available to wait for MRI to avoid further complications. Patient denies any fever, chills, nausea, vomiting.       Subjective & 24hr Events:   25 - Pt remains stable without new complaints.  Still tired of waiting on insurance to approve her rehab    Medically ready for discharge; awaiting placement since .    Assessment & Plan:   Neuropathy:  --Not a MS flare per Neurologist  -Can not complete MRI due to  [employer] [employer] being her Neurologist. Per Dr. Griffiths Neurology, MRI is not an urgent matter at this time.  - cont Lyrica      Intermittent hypotension  -appears to be asymptomatic.  Runs low at baseline.  Monitor for symptoms.    Anxiety:   stopped Prozac and Cymbalta several months PTA but 
       Hospitalist Progress Note   Admit Date:  2025  5:58 PM   Name:  Alva Kramer   Age:  54 y.o.  Sex:  female  :  1970   MRN:  [id number]   Room:  [facility]    Presenting/Chief Complaint: generalized pain     Reason(s) for Admission: Burning sensation [R20.8]  Generalized pain [R52]     Hospital Course:   Alva Kramer is a 54 y.o. female with medical history of breast cancer, cervical myelopathy, transverse myelitis, multiple sclerosis not sure if she is on any medications, dyslipidemia presented to emergency room with chief complaint of diffuse burning sensation all over the body started couple of days ago, continued to get worse. Patient denies any localized weakness, patient reports history of chronic left upper and lower extremity heaviness which has not changed much. Evaluated in the emergency room, urinalysis did not show any UTI, CT chest was done did not show any infiltrates. CT head did not show any acute intracranial changes, emergency room physician contacted neurologist who recommended obtaining MRI first, if MRI shows any new lesions recommended steroids. Patient has  shunt for normal pressure hydrocephalus which need to be reprogrammed after MRI, MRI tech recommended until  is available to wait for MRI to avoid further complications. Patient denies any fever, chills, nausea, vomiting.       Subjective & 24hr Events:   Pt says neuropathy has improved.  Still weak but making progress.  No other complaints.    Medically ready for discharge; awaiting placement.    Assessment & Plan:   Neuropathy:  --Not a MS flare per Neurologist  -- Can not complete MRI at this time due to  [employer] [employer] being her Neurologist. Per Dr. Griffiths, MRI is not an urgent matter at this time.  - cont on Lyrica   -- Therapy ordered      Anxiety:   -Reports that she has stopped Prozac and Cymbalta several months ago but continue Cymbalta as this also has indication for neuropathy     Insomnia:   -Continue on 
       Hospitalist Progress Note   Admit Date:  2025  5:58 PM   Name:  Alva Kramer   Age:  54 y.o.  Sex:  female  :  1970   MRN:  [id number]   Room:  /    Presenting/Chief Complaint: generalized pain     Reason(s) for Admission: Burning sensation [R20.8]  Generalized pain [R52]     Hospital Course:   Alva Kramer is a 54 y.o. female with medical history of breast cancer, cervical myelopathy, transverse myelitis, multiple sclerosis not sure if she is on any medications, dyslipidemia presented to emergency room with chief complaint of diffuse burning sensation all over the body started couple of days ago, continued to get worse. Patient denies any localized weakness, patient reports history of chronic left upper and lower extremity heaviness which has not changed much. Evaluated in the emergency room, urinalysis did not show any UTI, CT chest was done did not show any infiltrates. CT head did not show any acute intracranial changes, emergency room physician contacted neurologist who recommended obtaining MRI first, if MRI shows any new lesions recommended steroids. Patient has  shunt for normal pressure hydrocephalus which need to be reprogrammed after MRI, MRI tech recommended until  is available to wait for MRI to avoid further complications. Patient denies any fever, chills, nausea, vomiting.       Subjective & 24hr Events:   Pt remains stable without new complaints though she is getting frustrated with her prolonged length of stay.    Medically ready for discharge; awaiting placement since .    Assessment & Plan:   Neuropathy:  --Not a MS flare per Neurologist  -Can not complete MRI due to  [facility] [employer] [facility] being her Neurologist. Per Dr. Griffiths Neurology, MRI is not an urgent matter at this time.  - cont Lyrica      Anxiety:   stopped Prozac and Cymbalta several months PTA but continue Cymbalta as this can be used for neuropathy     Insomnia:   -Continue on trazodone, melatonin.   
2159: okay to not have IV for 24 hours per Rolanda Lovett NP. See order.  
4 Eyes Skin Assessment     NAME:  Alva Kramer  YOB: 1970  MEDICAL RECORD NUMBER:  [id number]    The patient is being assessed for  Admission    I agree that at least one RN has performed a thorough Head to Toe Skin Assessment on the patient. ALL assessment sites listed below have been assessed.      Areas assessed by both nurses:    Head, Face, Ears, Shoulders, Back, Chest, Arms, Elbows, Hands, Sacrum. Buttock, Coccyx, Ischium, and Legs. Feet and Heels        Does the Patient have a Wound? No noted wound(s)       Watson Prevention initiated by RN: Yes  Wound Care Orders initiated by RN: No    Pressure Injury (Stage 3,4, Unstageable, DTI, NWPT, and Complex wounds) if present, place Wound referral order by RN under : No    New Ostomies, if present place, Ostomy referral order under : No     Nurse 1 eSignature: Electronically signed by Shelby Cantrell RN on 2/9/25 at 4:45 AM EST    **SHARE this note so that the co-signing nurse can place an eSignature**    Nurse 2 eSignature: Electronically signed by ARELI RUBALCAVA RN on 2/9/25 at 5:12 AM EST   
ACUTE OCCUPATIONAL THERAPY GOALS:   (Developed with and agreed upon by patient and/or caregiver.)  (1.) Alva Kramer  will complete lower body bathing and dressing with INDEPENDENCE and adaptive equipment as needed.    (2.) Alva Kramer will complete toileting with INDEPENDENCE.  (3.) Alva Kramer will tolerate 25 minutes of OT treatment with 1-2 rest breaks to increase activity tolerance for ADLs.   (4.) Alva Kramer will complete functional transfers with INDEPENDENCE and adaptive equipment as needed.   (5.) Alva Kramer will complete functional ADL task standing at sink INDEPENDENCE and adaptive equipment as needed.     Timeframe: 7 visits      OCCUPATIONAL THERAPY: Daily Note AM   OT Visit Days: 2   Time In/Out  OT Charge Capture  Rehab Caseload Tracker  OT Orders    Alva Kramer is a 54 y.o. female   PRIMARY DIAGNOSIS: Neuropathy  Burning sensation [R20.8]  Generalized pain [R52]       Inpatient: [place] [employer] [place] / Plan: [employer] [place] [place] / Product Type: *No Product type* /     ASSESSMENT:     REHAB RECOMMENDATIONS:   Recommendation to date pending progress:  Setting:  Inpatient Rehab Facility     Equipment:    To Be Determined     ASSESSMENT:  Ms. Kramer is doing fair today. Pt presents supine and agreeable to therapy. Pt overall min A with functional transfers and mobility of household distances with rolling walker in room, bathroom, and hallway. Pt completed grooming, toileting, and dressing tasks with assist. Pt continues to have deficits in strength, balance, functional mobility, and activity tolerance. Slow progress this date. Continue OT POC.        SUBJECTIVE:     Ms. Kramer states, \"did I do good?\"     Social/Functional Lives With: Family  Type of Home: Trailer  Home Layout: One level  Bathroom Shower/Tub: Walk-in shower  Bathroom Toilet: Standard  Bathroom Accessibility: Accessible  Home Equipment: Walker - Rolling, Cane, Wheelchair - Manual  Receives Help From: Family  Prior Level of 
ACUTE OCCUPATIONAL THERAPY GOALS:   (Developed with and agreed upon by patient and/or caregiver.)  (1.) Alva Kramer  will complete lower body bathing and dressing with INDEPENDENCE and adaptive equipment as needed.    (2.) Alva Kramer will complete toileting with INDEPENDENCE.  (3.) Alva Kramer will tolerate 25 minutes of OT treatment with 1-2 rest breaks to increase activity tolerance for ADLs.   (4.) Alva Kramer will complete functional transfers with INDEPENDENCE and adaptive equipment as needed.   (5.) Alva Kramer will complete functional ADL task standing at sink INDEPENDENCE and adaptive equipment as needed.     Timeframe: 7 visits      OCCUPATIONAL THERAPY: Daily Note AM   OT Visit Days: 3   Time In/Out  OT Charge Capture  Rehab Caseload Tracker  OT Orders    Alva Kramer is a 54 y.o. female   PRIMARY DIAGNOSIS: Neuropathy  Burning sensation [R20.8]  Generalized pain [R52]       Inpatient: Pay[place] [employer] [place] / [place] [employer] [place] / Product Type: *No Product type* /     ASSESSMENT:     REHAB RECOMMENDATIONS:   Recommendation to date pending progress:  Setting:  Inpatient Rehab Facility     Equipment:    To Be Determined     ASSESSMENT:  Ms. Kramer is doing fair today. Pt presents supine and agreeable to therapy, asking for help with getting into the shower today. Pt overall min A with functional transfers and mobility of household distances with rolling walker and/or grab bars in room and bathroom. Pt completed bathing, grooming, toileting, and dressing tasks with assist. Pt continues to be unsteady and requires multimodal cuing for safe use of rolling walker/ grab bars and hand placement during transfers. Pt also completed mobility of household distance in hallway with rolling walker and seated rest breaks as needed. Pt continues to have deficits in strength, balance, functional mobility, and activity tolerance. Slow progress this date. Continue OT POC.        SUBJECTIVE:     Ms. Kramer 
ACUTE OCCUPATIONAL THERAPY GOALS:   (Developed with and agreed upon by patient and/or caregiver.)  (1.) Alva Kramer  will complete lower body bathing and dressing with INDEPENDENCE and adaptive equipment as needed.    (2.) Alva Kramer will complete toileting with INDEPENDENCE.  (3.) Alva Kramer will tolerate 25 minutes of OT treatment with 1-2 rest breaks to increase activity tolerance for ADLs.   (4.) Alva Kramer will complete functional transfers with INDEPENDENCE and adaptive equipment as needed.   (5.) Alva Kramer will complete functional ADL task standing at sink INDEPENDENCE and adaptive equipment as needed.     Timeframe: 7 visits      OCCUPATIONAL THERAPY: Daily Note AM   OT Visit Days: 4   Time In/Out  OT Charge Capture  Rehab Caseload Tracker  OT Orders    Alva Kramer is a 54 y.o. female   PRIMARY DIAGNOSIS: Neuropathy  Burning sensation [R20.8]  Generalized pain [R52]       Inpatient: Payor: [employer] [place] / Plan: [employer] [place] / Product Type: *No Product type* /     ASSESSMENT:     REHAB RECOMMENDATIONS:   Recommendation to date pending progress:  Setting:  Inpatient Rehab Facility     Equipment:    To Be Determined     ASSESSMENT:  Ms. Kramer presents with decreased activity tolerance, balance, strength and mobility impacting ADLs. Pt received in bedside chair, eager to participate. Pt overall CGA rolling walker for functional transfers and mobility of household distances in hallway, 1 LOB, therapist corrected. Pt required seated rest break before ambulating again, pt LLE noted to follow through better than previous notes. Pt worked on navigating through opening doors/small spaces, needed verbal and visual cueing for strategy, problem solving and safety awareness. Pt also participated in LE dressing and grooming ADLs sitting/standing with CGA for dynamic balance. Pt is doing well, making improvements, but still has lots to work on with higher level processing, multitasking and 
ACUTE OCCUPATIONAL THERAPY GOALS:   (Developed with and agreed upon by patient and/or caregiver.)  (1.) Alva Kramer  will complete lower body bathing and dressing with INDEPENDENCE and adaptive equipment as needed.    (2.) Alva Kramer will complete toileting with INDEPENDENCE.  (3.) Alva Kramer will tolerate 25 minutes of OT treatment with 1-2 rest breaks to increase activity tolerance for ADLs.   (4.) Alva Kramer will complete functional transfers with INDEPENDENCE and adaptive equipment as needed.   (5.) Alva Kramer will complete functional ADL task standing at sink INDEPENDENCE and adaptive equipment as needed.     Timeframe: 7 visits      OCCUPATIONAL THERAPY: Daily Note AM   OT Visit Days: 5   Time In/Out  OT Charge Capture  Rehab Caseload Tracker  OT Orders    Alva Kramer is a 54 y.o. female   PRIMARY DIAGNOSIS: Acute sensory ataxic neuropathy  Burning sensation [R20.8]  Generalized pain [R52]       Inpatient: [place] [employer] [place] [place] / [place] [employer] [place] / Product Type: *No Product type* /     ASSESSMENT:     REHAB RECOMMENDATIONS:   Recommendation to date pending progress:  Setting:  Inpatient Rehab Facility     Equipment:    To Be Determined     ASSESSMENT:  Ms. Kramer is doing well today. Pt presents sitting up in chair upon arrival. Pt was able to perform sit to stand with supervision. Completed functional mobility in hallway for several laps. No loss of balance or fatigue noticed. Pt returned to room and back to chair. Already performed self care tasks. Making progress with goals. Will continue to benefit from skilled OT during stay.       SUBJECTIVE:     Ms. Kramer states, \"Do you know Yi\"     Social/Functional Lives With: Family  Type of Home: Trailer  Home Layout: One level  Bathroom Shower/Tub: Walk-in shower  Bathroom Toilet: Standard  Bathroom Accessibility: Accessible  Home Equipment: Walker - Rolling, Cane, Wheelchair - Manual  Receives Help From: Family  Prior Level of Assist 
ACUTE PHYSICAL THERAPY GOALS:   (Developed with and agreed upon by patient and/or caregiver.)  Goals updated 2/18/25    (1.) Alva Kramer  will move from supine to sit and sit to supine  with INDEPENDENT within 7 treatment day(s).    (2.) Alva Kramer will transfer from bed to chair and chair to bed with SUPERVISION using the least restrictive device within 7 treatment day(s).    (3.) Alva Kramer will ambulate with SUPERVISION for 1500 feet with the least restrictive device within 7 treatment day(s).   (4.) Alva Kramer will perform standing static and dynamic balance activities x 12 minutes with SUPERVISION to improve safety within 7 treatment day(s).  (5.) Alva Kramer will perform therapeutic exercises x 10 min for HEP with SUPERVISION to improve strength, endurance, and functional mobility within 7 treatment day(s).      PHYSICAL THERAPY Daily Note, Re-evaluation, and AM  (Link to Caseload Tracking: PT Visit Days : 1  Acknowledge Orders  Time In/Out  PT Charge Capture  Rehab Caseload Tracker    Alva Kramer is a 54 y.o. female   PRIMARY DIAGNOSIS: Neuropathy  Burning sensation [R20.8]  Generalized pain [R52]       Reason for Referral: Generalized Muscle Weakness (M62.81)  Difficulty in walking, Not elsewhere classified (R26.2)  Inpatient: Payor: [employer] [place] / Plan: [employer] [place] / Product Type: *No Product type* /     ASSESSMENT:     REHAB RECOMMENDATIONS:   Recommendation to date pending progress:  Setting:  Home Health Therapy    Equipment:    Rolling Walker     ASSESSMENT:  Ms. Kramer presents sitting up in chair, agreeable to therapy, re-evaluated today, goals updated. Today pt able to perform STS to RW and ambulate 900' and 600' with SBA/CGA for safety with seated rest break between, pt with some decreased step clearance with RLE, pt states that she has AFO at home but does not always wear, encouraged pt to have HHPT to assist with working with AFO, pt agreeable. Pt able to perform bed mob 
ACUTE PHYSICAL THERAPY GOALS:   (Developed with and agreed upon by patient and/or caregiver.)  LTG:  (1.)Ms. Kramer will move from supine to sit and sit to supine , scoot up and down, and roll side to side in bed with INDEPENDENCE within 7 treatment day(s).    (2.)Ms. Kramer will transfer from bed to chair and chair to bed with MODIFIED INDEPENDENCE using the least restrictive device within 7 treatment day(s).    (3.)Ms. Kramer will ambulate with SUPERVISION for 100 feet with the least restrictive device within 7 treatment day(s).  (4.)Ms. Kramer will participate in therapeutic activity/exercises x 25 minutes for increased activity tolerance within 7 treatment days.  (5.)Ms. Kramer will perform standing static and dynamic balance activities x 15 minutes with SUPERVISION to improve safety within 7 day(s).    PHYSICAL THERAPY: Daily Note AM   (Link to Caseload Tracking: PT Visit Days : 3  Time In/Out PT Charge Capture  Rehab Caseload Tracker  Orders    Alva Kramer is a 54 y.o. female   PRIMARY DIAGNOSIS: Neuropathy  Burning sensation [R20.8]  Generalized pain [R52]       Inpatient: [place] [employer] [place] / [place] [employer] [place] / Product Type: *No Product type* /     ASSESSMENT:     REHAB RECOMMENDATIONS:   Recommendation to date pending progress:  Setting:  Inpatient Rehab Facility    Equipment:    To Be Determined     ASSESSMENT:  Ms. Kramer was sitting up in chair on arrival and agreeable to therapy. She ambulated 75' x2 plus additional 40' with RW and min A. She is pretty unsteady and states she is having more trouble out of her L LE today. Reports of it feeling weak. She was left sitting up in chair with needs in reach. Some progress noted today.      SUBJECTIVE:   Ms. Kramer states, \"My leg just feels weird\"     Social/Functional Lives With: Family  Type of Home: Trailer  Home Layout: One level  Bathroom Shower/Tub: Walk-in shower  Bathroom Toilet: Standard  Bathroom Accessibility: Accessible  Home 
ACUTE PHYSICAL THERAPY GOALS:   (Developed with and agreed upon by patient and/or caregiver.)  LTG:  (1.)Ms. Kramer will move from supine to sit and sit to supine , scoot up and down, and roll side to side in bed with INDEPENDENCE within 7 treatment day(s).    (2.)Ms. Kramer will transfer from bed to chair and chair to bed with MODIFIED INDEPENDENCE using the least restrictive device within 7 treatment day(s).    (3.)Ms. Kramer will ambulate with SUPERVISION for 100 feet with the least restrictive device within 7 treatment day(s).  (4.)Ms. Kramer will participate in therapeutic activity/exercises x 25 minutes for increased activity tolerance within 7 treatment days.  (5.)Ms. Kramer will perform standing static and dynamic balance activities x 15 minutes with SUPERVISION to improve safety within 7 day(s).    PHYSICAL THERAPY: Daily Note AM   (Link to Caseload Tracking: PT Visit Days : 4  Time In/Out PT Charge Capture  Rehab Caseload Tracker  Orders    Alva Kramer is a 54 y.o. female   PRIMARY DIAGNOSIS: Neuropathy  Burning sensation [R20.8]  Generalized pain [R52]       Inpatient: [place] / [place] [employer] [place] / Product Type: *No Product type* /     ASSESSMENT:     REHAB RECOMMENDATIONS:   Recommendation to date pending progress:  Setting:  Inpatient Rehab Facility    Equipment:    To Be Determined     ASSESSMENT:  Ms. Kramer was supine in bed on arrival and agreeable to therapy. Supine to sit on edge of bed with SBA/S. She ambulated into restroom to void then transfer into shower with min/mod A. Pt is very unsteady. She ambulated 75' x2 with RW and min A. Numerous verbal cues for L foot placement as she tends to drag it as she fatigues. Pt left sitting up in chair with needs in reach. Good progress today.       SUBJECTIVE:   Ms. Kramer states, \"Thank you girls so much\"     Social/Functional Lives With: Family  Type of Home: Trailer  Home Layout: One level  Bathroom Shower/Tub: Walk-in 
ACUTE PHYSICAL THERAPY GOALS:   (Developed with and agreed upon by patient and/or caregiver.)  LTG:  (1.)Ms. Kramer will move from supine to sit and sit to supine , scoot up and down, and roll side to side in bed with INDEPENDENCE within 7 treatment day(s).    (2.)Ms. Kramer will transfer from bed to chair and chair to bed with MODIFIED INDEPENDENCE using the least restrictive device within 7 treatment day(s).    (3.)Ms. Kramer will ambulate with SUPERVISION for 100 feet with the least restrictive device within 7 treatment day(s).  (4.)Ms. Kramer will participate in therapeutic activity/exercises x 25 minutes for increased activity tolerance within 7 treatment days.  (5.)Ms. Kramer will perform standing static and dynamic balance activities x 15 minutes with SUPERVISION to improve safety within 7 day(s).    PHYSICAL THERAPY: Daily Note AM   (Link to Caseload Tracking: PT Visit Days : 5  Time In/Out PT Charge Capture  Rehab Caseload Tracker  Carl Kramer is a 54 y.o. female   PRIMARY DIAGNOSIS: Neuropathy  Burning sensation [R20.8]  Generalized pain [R52]       Inpatient: [place] / [place] [employer] [place] / Product Type: *No Product type* /     ASSESSMENT:     REHAB RECOMMENDATIONS:   Recommendation to date pending progress:  Setting:  Inpatient Rehab Facility    Equipment:    To Be Determined     ASSESSMENT:  Ms. Kramer was sitting up in chair and very motivated to participate in therapy. She ambulated 75' x3 with RW and min A. Worked on dynamic sitting balance activities while in chair. Pt continues to do well and work hard toward goals. Good progress.      SUBJECTIVE:   Ms. Kramer states, \"I want to walk\"     Social/Functional Lives With: Family  Type of Home: Trailer  Home Layout: One level  Bathroom Shower/Tub: Walk-in shower  Bathroom Toilet: Standard  Bathroom Accessibility: Accessible  Home Equipment: Walker - Rolling, Cane, Wheelchair - Manual  Receives Help From: 
ACUTE PHYSICAL THERAPY GOALS:   (Developed with and agreed upon by patient and/or caregiver.)  LTG:  (1.)Ms. Kramer will move from supine to sit and sit to supine , scoot up and down, and roll side to side in bed with INDEPENDENCE within 7 treatment day(s).    (2.)Ms. Kramer will transfer from bed to chair and chair to bed with MODIFIED INDEPENDENCE using the least restrictive device within 7 treatment day(s).    (3.)Ms. Kramer will ambulate with SUPERVISION for 100 feet with the least restrictive device within 7 treatment day(s).  (4.)Ms. Kramer will participate in therapeutic activity/exercises x 25 minutes for increased activity tolerance within 7 treatment days.  (5.)Ms. Kramer will perform standing static and dynamic balance activities x 15 minutes with SUPERVISION to improve safety within 7 day(s).    PHYSICAL THERAPY: Daily Note AM   (Link to Caseload Tracking: PT Visit Days : 6  Time In/Out PT Charge Capture  Rehab Caseload Tracker  Orders    Alva Kramer is a 54 y.o. female   PRIMARY DIAGNOSIS: Neuropathy  Burning sensation [R20.8]  Generalized pain [R52]       Inpatient: [place] / [place] [employer] [place] / Product Type: *No Product type* /     ASSESSMENT:     REHAB RECOMMENDATIONS:   Recommendation to date pending progress:  Setting:  Inpatient Rehab Facility    Equipment:    To Be Determined     ASSESSMENT:  Ms. Kramer was sitting up in chair and very motivated to participate in therapy. Today pt able to stand to RW, gait belt placed, and was able to ambulate in hallway 600 with CGA for safety and balance, required extra time to perform task, pt reports feeling like she is having some problems with her balance, juan david static standing. Pt with seated rest break then able to amb 300 feet with RW, assisted back to room and to bathroom with SBA for safety. Pt then able to transfer to chair with RW with CGA. Pt practiced static standing at RW level, Min A at times to assist in 
ACUTE PHYSICAL THERAPY GOALS:   (Developed with and agreed upon by patient and/or caregiver.)  LTG:  (1.)Ms. Kramer will move from supine to sit and sit to supine , scoot up and down, and roll side to side in bed with INDEPENDENCE within 7 treatment day(s).    (2.)Ms. Kramer will transfer from bed to chair and chair to bed with MODIFIED INDEPENDENCE using the least restrictive device within 7 treatment day(s).    (3.)Ms. Kramer will ambulate with SUPERVISION for 100 feet with the least restrictive device within 7 treatment day(s).  (4.)Ms. Kramer will participate in therapeutic activity/exercises x 25 minutes for increased activity tolerance within 7 treatment days.  (5.)Ms. Kramer will perform standing static and dynamic balance activities x 15 minutes with SUPERVISION to improve safety within 7 day(s).    PHYSICAL THERAPY: Daily Note PM   (Link to Caseload Tracking: PT Visit Days : 2  Time In/Out PT Charge Capture  Rehab Caseload Tracker  Carl Kramer is a 54 y.o. female   PRIMARY DIAGNOSIS: Neuropathy  Burning sensation [R20.8]  Generalized pain [R52]       Inpatient: [place] / [place] [employer] [place] / Product Type: *No Product type* /     ASSESSMENT:     REHAB RECOMMENDATIONS:   Recommendation to date pending progress:  Setting:  Inpatient Rehab Facility    Equipment:    To Be Determined     ASSESSMENT:  Ms. Kramer was sitting on toilet on arrival and agreeabel to therapy. Sit to stand with min A from low surface. Stood at sink to wash hands with min A. Ambulated 80' then additional 40' with RW and min A plus chair follow. Pt is very unsteady with gait. She was left sitting up in chair with needs in reach and brothers present. Some progress noted today.     SUBJECTIVE:   Ms. Kramer states, \"I feel better than Saturday\"     Social/Functional Lives With: Family  Type of Home: Trailer  Home Layout: One level  Bathroom Shower/Tub: Walk-in shower  Bathroom Toilet: 
ACUTE PHYSICAL THERAPY GOALS:   (Developed with and agreed upon by patient and/or caregiver.)  LTG:  (1.)Ms. Kramer will move from supine to sit and sit to supine , scoot up and down, and roll side to side in bed with INDEPENDENCE within 7 treatment day(s).    (2.)Ms. Kramer will transfer from bed to chair and chair to bed with MODIFIED INDEPENDENCE using the least restrictive device within 7 treatment day(s).    (3.)Ms. Kramer will ambulate with SUPERVISION for 100 feet with the least restrictive device within 7 treatment day(s).  (4.)Ms. Kramer will participate in therapeutic activity/exercises x 25 minutes for increased activity tolerance within 7 treatment days.  (5.)Ms. Kramer will perform standing static and dynamic balance activities x 15 minutes with SUPERVISION to improve safety within 7 day(s).    ________________________________________________________________________________________________        PHYSICAL THERAPY Initial Assessment and AM  (Link to Caseload Tracking: PT Visit Days : 1  Acknowledge Orders  Time In/Out  PT Charge Capture  Rehab Caseload Tracker    Alva Kramer is a 54 y.o. female   PRIMARY DIAGNOSIS: Burning sensation  Burning sensation [R20.8]  Generalized pain [R52]       Reason for Referral: Generalized Muscle Weakness (M62.81)  Difficulty in walking, Not elsewhere classified (R26.2)  History of falling (Z91.81)  Inpatient: [place] [employer] [place] / [facility]: [facility] [place] [place] / Product Type: *No Product type* /     ASSESSMENT:     REHAB RECOMMENDATIONS:   Recommendation to date pending progress:  Setting:  Inpatient Rehab Facility    Equipment:    To Be Determined     ASSESSMENT:  Ms. Kramer was admitted to the hospital with c/o pain and burning sensations.  Pt has a history of MS, breast cancer, and  shunt.  She presents to PT with WFL AROM and decreased strength in B LEs.  Pt was able to stand today with CGA/RW and fair standing balance.  She ambulated today with 
End of Shift Note: 7p ~ 7a     Pt with no c/o voiced this shift.   Appears to have rested well.  NAD noted     Shelby Cantrell RN OCN  
Nutrition Assessment  Assessment Type: Initial  Reason for visit:  Length of Stay  Malnutrition Screening Tool Score: 0    Nutrition Intervention:   Food and/or Nutrient Delivery:   Meals and Snacks:  Diet: Continue current order  Medical Food Supplements:   Medical food supplement therapy:  None Not applicable     Malnutrition Assessment:  Academy/A.S.P.E[facility]N Clinical Malnutrition Criteria  Malnutrition Status: No malnutrition  Nutrition Focused Physical Exam: Unremarkable     Nutrition Assessment:  Food/Nutrition Related History: Patient reports good PO PTA.      Do You Have Any Cultural, Faith, or Ethnic Food Preferences?: No   Weight History: 182-195# per EMR review  Nutrition Background:       PMH: breast cancer, cervical myelopathy, transverse myelitis, MS, HLD. Presented with burning all over. Admitted for MS flare work up. Found to have neuropathy.  Nutrition Monitoring/Evaluation:  Patient up to recliner. She states she just wants to go home. States she is eating too well. This is consistent with charting. Denies any acute needs.     Current Nutrition Therapies:  ADULT DIET; Regular    Current Intake:   Average Meal Intake: %        Anthropometric Measures:  Height: 170.2 cm (5' 7\")  Current Body Wt: 86.2 kg (190 lb 0.6 oz) (2/15), Weight source: Standing scale  BMI: 29.8, Overweight (BMI 25.0-29.9)  Admission Body Weight: 85.6 kg (188 lb 11.4 oz) (2/9)  Ideal Body Weight (Kg) (Calculated): 61 kg (135 lbs), 140.8 %  BMI Category Overweight (BMI 25.0-29.9)  Comparative Standards:  Energy (kcal/day): 8603-5137 (20-25 kcal/kg) (Kcal/kg Weight used: 85.6 kg Admission  Protein (g/day): 68-86 (0.8-1 g/kg) Weight Used: (Admission) 85.6 kg  Fluid (ml/day):   (1 ml/kcal)    Nutrition Diagnosis:   No nutrition diagnosis at this time       Nutrition Goal(s):      Active Goal: Meet at least 75% of estimated needs, by next RD assessment       Discharge Planning:    No discharge needs at this time    LEAH VERDUZCO 
Outpatient Pharmacy Progress Note for Meds-to-Beds    Total number of Prescriptions Filled: 2    List of Medications (name,strength):  DULoxetine 30mg  Pregabalin 100mg      Delivered to: han nurse      Co-pay:0      Payment Type:0      Additional Documentation:  Medication(s) were delivered to the patient's room prior to discharge      Thank you for letting us serve your patients.  [facility] [id number] [place] [address]  Phone: [phone]  Fax: [phone]       
Patient has a programmable shunt and her neurosurgeon is with [facility]. Pt will need to have that doctor check shunt pre and post MRI, and that doctor doesn't work here. Notified Nurse.  
Patient having small, loose frequent stools while passing frequent gas.  Pt requested antidiarrheal medication. Messaged Dr. Ruiz and orders placed for imodium 4 mg.      -bed and chair alarm have been kept on, but pt asking if it can be turned of.  Pt frequently getting up to use bathroom without calling staff. Pt unstable while ambulating, pt educated about risk off fall and how it is policy to keep bed/chair alarm on. Pt informed that staff would rather assist pt to bathroom frequently than to risk her falling or to allow her to use a bedside commode.  Pt still not wanting to use bedside and still asking for alarms to be cut off.  Alarm kept on for now for patient safety.   
TRANSFER - IN REPORT:    Verbal report received from Sommer Sanches RN on Alva Kramer  being received from ER for routine progression of patient care      Report consisted of patient's Situation, Background, Assessment and   Recommendations(SBAR).     Information from the following report(s) Nurse Handoff Report, ED Encounter Summary, ED SBAR, Intake/Output, MAR, and Recent Results was reviewed with the receiving nurse.    Opportunity for questions and clarification was provided.      Assessment to be completed upon patient's arrival to unit and care assumed.    
DO Alexandrea    Part of this note may have been written by using a voice dictation software.  The note has been proof read but may still contain some grammatical/other typographical errors.

## 2025-02-19 NOTE — PLAN OF CARE
Problem: Pain  Goal: Verbalizes/displays adequate comfort level or baseline comfort level  Outcome: Adequate for Discharge     Problem: Skin/Tissue Integrity  Goal: Skin integrity remains intact  Description: 1.  Monitor for areas of redness and/or skin breakdown  2.  Assess vascular access sites hourly  3.  Every 4-6 hours minimum:  Change oxygen saturation probe site  4.  Every 4-6 hours:  If on nasal continuous positive airway pressure, respiratory therapy assess nares and determine need for appliance change or resting period  Outcome: Adequate for Discharge     Problem: Safety - Adult  Goal: Free from fall injury  Outcome: Adequate for Discharge

## 2025-02-19 NOTE — CARE COORDINATION
02/19/25 7120   Service Assessment   Patient Orientation Alert and Oriented   Cognition Alert   History Provided By Patient   Primary Caregiver Self   Patient's Healthcare Decision Maker is: Legal Next of Kin   Prior Functional Level Assistance with the following:   Current Functional Level Assistance with the following:   Can patient return to prior living arrangement Yes   Financial Resources Medicaid   Social/Functional History   Lives With Family   Type of Home Trailer   Home Layout One level   Bathroom Shower/Tub Walk-in shower   Bathroom Toilet Standard   Bathroom Accessibility Accessible   Home Equipment Walker - Rolling;Cane;Wheelchair - Manual   Receives Help From Family   Prior Level of Assist for ADLs Independent   Prior Level of Assist for Homemaking Independent   Ambulation Assistance Needs assistance   Prior Level of Assist for Transfers Independent   Active  No   Mode of Transportation Car   Occupation On disability   Discharge Planning   Type of Residence House   Living Arrangements Family Members   Potential Assistance Needed Durable Medical Equipment;Home Care   Potential DME Needed Walker   DME Ordered? Walker   Potential Assistance Purchasing Medications No   Type of Home Care Services OT;PT;Nursing Services   Patient expects to be discharged to: House   Services At/After Discharge   Transition of Care Consult (CM Consult) Home Health   Internal Home Health Yes   Services At/After Discharge DME;Home Health   Condition of Participation: Discharge Planning   The Patient and/or Patient Representative was provided with a Choice of Provider? Patient   The Patient and/Or Patient Representative agree with the Discharge Plan? Yes   Freedom of Choice list was provided with basic dialogue that supports the patient's individualized plan of care/goals, treatment preferences, and shares the quality data associated with the providers?  Yes     The patient is discharging home with a rolling walker and

## 2025-02-19 NOTE — CARE COORDINATION
0920: RN CM called [facility] and left a HIPAA compliant voicemail requesting a return phone call.    0933: Return phone call received from the representative from [facility] reporting they are unable to accept the patient for services.    1140: Discharge planning was discussed with the patient. RN CM informed her she is pending a home health agency.    1317: [facility] is reviewing the referral again.

## 2025-02-19 NOTE — CARE COORDINATION
Message received from [facility] by [employer] reporting they can accept the patient if the physician is in agreement for the start of care date of 2/22/2025. Discharge planning was discussed with the attending MD. He is in agreement with the start of care date. The home health order was updated and confirmed with the attending MD. The updated order was sent to [facility] by [employer].

## 2025-02-20 RX ORDER — HYDROXYZINE PAMOATE 25 MG/1
25 CAPSULE ORAL 3 TIMES DAILY PRN
Qty: 60 CAPSULE | Refills: 1 | Status: SHIPPED | OUTPATIENT
Start: 2025-02-20

## 2025-03-26 LAB
Lab: NORMAL
Lab: NORMAL
REFERENCE LAB: NORMAL

## 2025-04-30 ENCOUNTER — OFFICE VISIT (OUTPATIENT)
Age: 55
End: 2025-04-30
Payer: MEDICAID

## 2025-04-30 DIAGNOSIS — Z98.1 S/P CERVICAL SPINAL FUSION: Primary | ICD-10-CM

## 2025-04-30 PROCEDURE — 99213 OFFICE O/P EST LOW 20 MIN: CPT | Performed by: ORTHOPAEDIC SURGERY

## 2025-04-30 NOTE — PROGRESS NOTES
Name: Alva Kramer  YOB: 1970  Gender: female  MRN: [id number]  Age: 55 y.o.    Chief Complaint: Neck surgery follow up.    History of present illness:    Alva Kramer is here for 1 year follow up of her C3-C6 posterior cervical laminectomy and fusion surgery. She reports significant relief of preoperative upper extremity pain, weakness and parasthesias.  The wound is benign.  She still continues to have tingling in her hands.  She states that she continues to make improvements.     Medications:   Current Outpatient Medications   Medication Sig    hydrOXYzine pamoate (VISTARIL) 25 MG capsule Take 1 capsule by mouth 3 times daily as needed for Anxiety    DULoxetine (CYMBALTA) 30 MG extended release capsule Take 1 capsule by mouth daily    pregabalin (LYRICA) 100 MG capsule Take 1 capsule by mouth 3 times daily for 60 days. Max Daily Amount: 300 mg    diclofenac sodium (VOLTAREN) 1 % GEL Apply 4 g topically 4 times daily as needed for Pain    methyl salicylate-menthol (ASHLEY SELF GREASELESS) 10-15 % CREA Apply topically 3 times daily as needed for Pain    sennosides-docusate sodium (SENOKOT-S) 8.6-50 MG tablet Take 1 tablet by mouth daily as needed for Constipation    traZODone (DESYREL) 150 MG tablet Take 1 tablet by mouth nightly    acetaminophen (TYLENOL) 325 MG tablet Take 2 tablets by mouth every 6 hours as needed for Pain    famotidine (PEPCID) 20 MG tablet Take 1 tablet by mouth 2 times daily    melatonin 3 MG TABS tablet Take 5 mg by mouth nightly as needed     No current facility-administered medications for this visit.       Allergies:   Allergies   Allergen Reactions    Adhesive Tape Rash     Other reaction(s): Rash-Allergy        Physical Exam:     Oriented to person, place and time.    Mood and affect are appropriate.    Respirations are unlabored and there is no evidence of cyanosis.    Wound is healed up nicely without erythema or underlying fluctuance.     Sensory testing reveals intact

## 2025-05-03 ENCOUNTER — HOSPITAL ENCOUNTER (EMERGENCY)
Age: 55
Discharge: HOME | End: 2025-05-03
Attending: EMERGENCY MEDICINE
Payer: MEDICAID

## 2025-05-03 VITALS
TEMPERATURE: 97.8 F | WEIGHT: 198 LBS | SYSTOLIC BLOOD PRESSURE: 121 MMHG | OXYGEN SATURATION: 96 % | BODY MASS INDEX: 31.08 KG/M2 | DIASTOLIC BLOOD PRESSURE: 92 MMHG | RESPIRATION RATE: 15 BRPM | HEART RATE: 73 BPM | HEIGHT: 67 IN

## 2025-05-03 DIAGNOSIS — R52 BODY ACHES: Primary | ICD-10-CM

## 2025-05-03 LAB
ALBUMIN SERPL-MCNC: 3.6 G/DL (ref 3.5–5)
ALBUMIN/GLOB SERPL: 1.3 (ref 1–1.9)
ALP SERPL-CCNC: 86 U/L (ref 35–104)
ALT SERPL-CCNC: 10 U/L (ref 8–45)
ANION GAP SERPL CALC-SCNC: 11 MMOL/L (ref 7–16)
AST SERPL-CCNC: 14 U/L (ref 15–37)
BASOPHILS # BLD: 0.05 K/UL (ref 0–0.2)
BASOPHILS NFR BLD: 1.1 % (ref 0–2)
BILIRUB SERPL-MCNC: 0.5 MG/DL (ref 0–1.2)
BUN SERPL-MCNC: 9 MG/DL (ref 6–23)
CALCIUM SERPL-MCNC: 9.4 MG/DL (ref 8.8–10.2)
CHLORIDE SERPL-SCNC: 107 MMOL/L (ref 98–107)
CO2 SERPL-SCNC: 24 MMOL/L (ref 20–29)
CREAT SERPL-MCNC: 0.81 MG/DL (ref 0.6–1.1)
DIFFERENTIAL METHOD BLD: ABNORMAL
EOSINOPHIL # BLD: 0.14 K/UL (ref 0–0.8)
EOSINOPHIL NFR BLD: 2.9 % (ref 0.5–7.8)
ERYTHROCYTE [DISTWIDTH] IN BLOOD BY AUTOMATED COUNT: 13.2 % (ref 11.9–14.6)
GLOBULIN SER CALC-MCNC: 2.9 G/DL (ref 2.3–3.5)
GLUCOSE SERPL-MCNC: 103 MG/DL (ref 70–99)
HCT VFR BLD AUTO: 41.8 % (ref 35.8–46.3)
HGB BLD-MCNC: 13.7 G/DL (ref 11.7–15.4)
IMM GRANULOCYTES # BLD AUTO: 0 K/UL (ref 0–0.5)
IMM GRANULOCYTES NFR BLD AUTO: 0 % (ref 0–5)
LYMPHOCYTES # BLD: 1.24 K/UL (ref 0.5–4.6)
LYMPHOCYTES NFR BLD: 26.1 % (ref 13–44)
MAGNESIUM SERPL-MCNC: 2.1 MG/DL (ref 1.8–2.4)
MCH RBC QN AUTO: 27.1 PG (ref 26.1–32.9)
MCHC RBC AUTO-ENTMCNC: 32.8 G/DL (ref 31.4–35)
MCV RBC AUTO: 82.6 FL (ref 82–102)
MONOCYTES # BLD: 0.39 K/UL (ref 0.1–1.3)
MONOCYTES NFR BLD: 8.2 % (ref 4–12)
NEUTS SEG # BLD: 2.94 K/UL (ref 1.7–8.2)
NEUTS SEG NFR BLD: 61.7 % (ref 43–78)
NRBC # BLD: 0 K/UL (ref 0–0.2)
PLATELET # BLD AUTO: 202 K/UL (ref 150–450)
PMV BLD AUTO: 9.2 FL (ref 9.4–12.3)
POTASSIUM SERPL-SCNC: 4.4 MMOL/L (ref 3.5–5.1)
PROT SERPL-MCNC: 6.5 G/DL (ref 6.3–8.2)
RBC # BLD AUTO: 5.06 M/UL (ref 4.05–5.2)
SODIUM SERPL-SCNC: 142 MMOL/L (ref 136–145)
WBC # BLD AUTO: 4.8 K/UL (ref 4.3–11.1)

## 2025-05-03 PROCEDURE — 80053 COMPREHEN METABOLIC PANEL: CPT

## 2025-05-03 PROCEDURE — 85025 COMPLETE CBC W/AUTO DIFF WBC: CPT

## 2025-05-03 PROCEDURE — 6360000002 HC RX W HCPCS: Performed by: EMERGENCY MEDICINE

## 2025-05-03 PROCEDURE — 83735 ASSAY OF MAGNESIUM: CPT

## 2025-05-03 PROCEDURE — 99284 EMERGENCY DEPT VISIT MOD MDM: CPT

## 2025-05-03 PROCEDURE — 6370000000 HC RX 637 (ALT 250 FOR IP): Performed by: EMERGENCY MEDICINE

## 2025-05-03 PROCEDURE — 96372 THER/PROPH/DIAG INJ SC/IM: CPT

## 2025-05-03 RX ORDER — DEXAMETHASONE SODIUM PHOSPHATE 10 MG/ML
10 INJECTION, SOLUTION INTRA-ARTICULAR; INTRALESIONAL; INTRAMUSCULAR; INTRAVENOUS; SOFT TISSUE
Status: COMPLETED | OUTPATIENT
Start: 2025-05-03 | End: 2025-05-03

## 2025-05-03 RX ORDER — HYDROCODONE BITARTRATE AND ACETAMINOPHEN 5; 325 MG/1; MG/1
2 TABLET ORAL
Refills: 0 | Status: COMPLETED | OUTPATIENT
Start: 2025-05-03 | End: 2025-05-03

## 2025-05-03 RX ORDER — PREDNISONE 20 MG/1
60 TABLET ORAL DAILY
Qty: 15 TABLET | Refills: 0 | Status: SHIPPED | OUTPATIENT
Start: 2025-05-03 | End: 2025-05-08

## 2025-05-03 RX ADMIN — HYDROCODONE BITARTRATE AND ACETAMINOPHEN 2 TABLET: 5; 325 TABLET ORAL at 12:36

## 2025-05-03 RX ADMIN — DEXAMETHASONE SODIUM PHOSPHATE 10 MG: 10 INJECTION INTRAMUSCULAR; INTRAVENOUS at 12:37

## 2025-05-03 ASSESSMENT — PAIN DESCRIPTION - DESCRIPTORS: DESCRIPTORS: ACHING

## 2025-05-03 ASSESSMENT — PAIN DESCRIPTION - LOCATION
LOCATION: GENERALIZED
LOCATION: GENERALIZED

## 2025-05-03 ASSESSMENT — PAIN - FUNCTIONAL ASSESSMENT: PAIN_FUNCTIONAL_ASSESSMENT: 0-10

## 2025-05-03 ASSESSMENT — PAIN SCALES - GENERAL
PAINLEVEL_OUTOF10: 10
PAINLEVEL_OUTOF10: 10

## 2025-05-03 NOTE — ED PROVIDER NOTES
Emergency Department Provider Note       PCP: No primary care provider on file.   Age: 55 y.o.   Sex: female     DISPOSITION Decision To Discharge 05/03/2025 12:17:37 PM   DISPOSITION CONDITION Stable            ICD-10-CM    1. Body aches  R52           Medical Decision Making     In spite of history of MS, currently patient clinically appears well.  Vitally stable.  Sitting up.  Perhaps this may be an early flare and will treat in standard fashion with steroids.  I suspect an overwhelming majority of her symptoms is likely due to his nonspecific pain and arthralgias.  Will discharge with a steroid burst.  I did consider MRI imaging to further evaluate a possible MS flare however clinically did not appear apparent and with her stimulator it is not easy to obtain a stat MRI.  I feel conservative management the outpatient setting prudent and patient involved in decision making and comfortable with the plan.     1 acute illness with systemic symptoms.  Patient was discharged risks and benefits of hospitalization were considered.  Shared medical decision making was utilized in creating the patients health plan today.    I independently ordered and reviewed each unique test.  I reviewed external records: ED visit note from a different ED.   I reviewed external records: provider visit note from PCP.                   History     Patient presents to the emergency department the chief complaint of generalized myalgias and arthralgias for the last 24 hours.  She has a history of MS and reports compliance with her medications.  States some of her MS flares feel similar.  She generally ambulates with a walker and occasionally needs the use of a wheelchair.  She has been ambulating with a walker today.  Denies any recent URI-like illness.  No associated fevers.  Denies any dyspnea or chest pain.  No associated nausea vomiting or diarrhea.  Tolerating p.o. without difficulty.    The history is provided by the patient.    Recent Concern: Housing Stability - High Risk (2/9/2025)    Housing Stability Vital Sign     Unable to Pay for Housing in the Last Year: No     Number of Times Moved in the Last Year: 2     Homeless in the Last Year: No        Discharge Medication List as of 5/3/2025 12:43 PM        CONTINUE these medications which have NOT CHANGED    Details   hydrOXYzine pamoate (VISTARIL) 25 MG capsule Take 1 capsule by mouth 3 times daily as needed for Anxiety, Disp-60 capsule, R-1Normal      DULoxetine (CYMBALTA) 30 MG extended release capsule Take 1 capsule by mouth daily, Disp-30 capsule, R-2Normal      pregabalin (LYRICA) 100 MG capsule Take 1 capsule by mouth 3 times daily for 60 days. Max Daily Amount: 300 mg, Disp-90 capsule, R-1Normal      diclofenac sodium (VOLTAREN) 1 % GEL Apply 4 g topically 4 times daily as needed for Pain, Topical, 4 TIMES DAILY PRN Starting Tue 4/30/2024, Disp-50 g, R-0, NO PRINT      methyl salicylate-menthol (ASHLEY SELF GREASELESS) 10-15 % CREA Apply topically 3 times daily as needed for Pain, Apply externally, 3 TIMES DAILY PRN Starting Tue 4/30/2024, Disp-57 g, R-0, NO PRINT      sennosides-docusate sodium (SENOKOT-S) 8.6-50 MG tablet Take 1 tablet by mouth daily as needed for Constipation, Disp-30 tablet, R-0NO PRINT      traZODone (DESYREL) 150 MG tablet Take 1 tablet by mouth nightlyHistorical Med      acetaminophen (TYLENOL) 325 MG tablet Take 2 tablets by mouth every 6 hours as needed for PainHistorical Med      famotidine (PEPCID) 20 MG tablet Take 1 tablet by mouth 2 times dailyHistorical Med      melatonin 3 MG TABS tablet Take 5 mg by mouth nightly as neededHistorical Med              Results for orders placed or performed during the hospital encounter of 05/03/25   CBC with Auto Differential   Result Value Ref Range    WBC 4.8 4.3 - 11.1 K/uL    RBC 5.06 4.05 - 5.2 M/uL    Hemoglobin 13.7 11.7 - 15.4 g/dL    Hematocrit 41.8 35.8 - 46.3 %    MCV 82.6 82 - 102 FL    MCH 27.1 26.1 - 32.9

## 2025-05-03 NOTE — ED TRIAGE NOTES
Patient a 54 y/o Female reports to the ED with c/c of Generalized body aches since yesterday. Hx of MS. Took tylenol last night. Denies any N/V/D.

## 2025-05-18 ENCOUNTER — HOSPITAL ENCOUNTER (EMERGENCY)
Age: 55
Discharge: HOME | End: 2025-05-18
Attending: EMERGENCY MEDICINE
Payer: MEDICAID

## 2025-05-18 VITALS
BODY MASS INDEX: 31.08 KG/M2 | OXYGEN SATURATION: 99 % | RESPIRATION RATE: 18 BRPM | HEIGHT: 67 IN | WEIGHT: 198 LBS | SYSTOLIC BLOOD PRESSURE: 130 MMHG | HEART RATE: 92 BPM | TEMPERATURE: 97.5 F | DIASTOLIC BLOOD PRESSURE: 84 MMHG

## 2025-05-18 DIAGNOSIS — G61.0: Primary | ICD-10-CM

## 2025-05-18 DIAGNOSIS — G62.9 NEUROPATHY: ICD-10-CM

## 2025-05-18 LAB
ANION GAP SERPL CALC-SCNC: 15 MMOL/L (ref 7–16)
BASOPHILS # BLD: 0.03 K/UL (ref 0–0.2)
BASOPHILS NFR BLD: 0.2 % (ref 0–2)
BUN SERPL-MCNC: 20 MG/DL (ref 6–23)
CALCIUM SERPL-MCNC: 9.7 MG/DL (ref 8.8–10.2)
CHLORIDE SERPL-SCNC: 104 MMOL/L (ref 98–107)
CK SERPL-CCNC: 32 U/L (ref 21–215)
CO2 SERPL-SCNC: 23 MMOL/L (ref 20–29)
CREAT SERPL-MCNC: 0.88 MG/DL (ref 0.6–1.1)
DIFFERENTIAL METHOD BLD: ABNORMAL
EOSINOPHIL # BLD: 0.09 K/UL (ref 0–0.8)
EOSINOPHIL NFR BLD: 0.7 % (ref 0.5–7.8)
ERYTHROCYTE [DISTWIDTH] IN BLOOD BY AUTOMATED COUNT: 13.8 % (ref 11.9–14.6)
GLUCOSE SERPL-MCNC: 141 MG/DL (ref 70–99)
HCT VFR BLD AUTO: 41.8 % (ref 35.8–46.3)
HGB BLD-MCNC: 14.1 G/DL (ref 11.7–15.4)
IMM GRANULOCYTES # BLD AUTO: 0.06 K/UL (ref 0–0.5)
IMM GRANULOCYTES NFR BLD AUTO: 0.4 % (ref 0–5)
LYMPHOCYTES # BLD: 2.01 K/UL (ref 0.5–4.6)
LYMPHOCYTES NFR BLD: 14.7 % (ref 13–44)
MAGNESIUM SERPL-MCNC: 2.4 MG/DL (ref 1.8–2.4)
MCH RBC QN AUTO: 27 PG (ref 26.1–32.9)
MCHC RBC AUTO-ENTMCNC: 33.7 G/DL (ref 31.4–35)
MCV RBC AUTO: 79.9 FL (ref 82–102)
MONOCYTES # BLD: 1 K/UL (ref 0.1–1.3)
MONOCYTES NFR BLD: 7.3 % (ref 4–12)
NEUTS SEG # BLD: 10.49 K/UL (ref 1.7–8.2)
NEUTS SEG NFR BLD: 76.7 % (ref 43–78)
NRBC # BLD: 0 K/UL (ref 0–0.2)
PLATELET # BLD AUTO: 247 K/UL (ref 150–450)
PMV BLD AUTO: 9 FL (ref 9.4–12.3)
POTASSIUM SERPL-SCNC: 3.3 MMOL/L (ref 3.5–5.1)
RBC # BLD AUTO: 5.23 M/UL (ref 4.05–5.2)
SODIUM SERPL-SCNC: 141 MMOL/L (ref 136–145)
WBC # BLD AUTO: 13.7 K/UL (ref 4.3–11.1)

## 2025-05-18 PROCEDURE — 82550 ASSAY OF CK (CPK): CPT

## 2025-05-18 PROCEDURE — 6370000000 HC RX 637 (ALT 250 FOR IP): Performed by: EMERGENCY MEDICINE

## 2025-05-18 PROCEDURE — 96374 THER/PROPH/DIAG INJ IV PUSH: CPT

## 2025-05-18 PROCEDURE — 99284 EMERGENCY DEPT VISIT MOD MDM: CPT

## 2025-05-18 PROCEDURE — 85025 COMPLETE CBC W/AUTO DIFF WBC: CPT

## 2025-05-18 PROCEDURE — 6360000002 HC RX W HCPCS: Performed by: EMERGENCY MEDICINE

## 2025-05-18 PROCEDURE — 80048 BASIC METABOLIC PNL TOTAL CA: CPT

## 2025-05-18 PROCEDURE — 83735 ASSAY OF MAGNESIUM: CPT

## 2025-05-18 RX ORDER — KETOROLAC TROMETHAMINE 15 MG/ML
15 INJECTION, SOLUTION INTRAMUSCULAR; INTRAVENOUS ONCE
Status: COMPLETED | OUTPATIENT
Start: 2025-05-18 | End: 2025-05-18

## 2025-05-18 RX ORDER — PREGABALIN 100 MG/1
100 CAPSULE ORAL 3 TIMES DAILY
Qty: 90 CAPSULE | Refills: 0 | Status: SHIPPED | OUTPATIENT
Start: 2025-05-18 | End: 2025-06-17

## 2025-05-18 RX ORDER — HYDROCODONE BITARTRATE AND ACETAMINOPHEN 7.5; 325 MG/1; MG/1
1 TABLET ORAL EVERY 6 HOURS PRN
Qty: 12 TABLET | Refills: 0 | Status: SHIPPED | OUTPATIENT
Start: 2025-05-18 | End: 2025-05-21

## 2025-05-18 RX ORDER — HYDROCODONE BITARTRATE AND ACETAMINOPHEN 5; 325 MG/1; MG/1
1 TABLET ORAL
Refills: 0 | Status: COMPLETED | OUTPATIENT
Start: 2025-05-18 | End: 2025-05-18

## 2025-05-18 RX ORDER — PREGABALIN 25 MG/1
100 CAPSULE ORAL
Status: COMPLETED | OUTPATIENT
Start: 2025-05-18 | End: 2025-05-18

## 2025-05-18 RX ADMIN — PREGABALIN 100 MG: 25 CAPSULE ORAL at 15:52

## 2025-05-18 RX ADMIN — KETOROLAC TROMETHAMINE 15 MG: 15 INJECTION, SOLUTION INTRAMUSCULAR; INTRAVENOUS at 15:52

## 2025-05-18 RX ADMIN — HYDROCODONE BITARTRATE AND ACETAMINOPHEN 1 TABLET: 5; 325 TABLET ORAL at 15:53

## 2025-05-18 ASSESSMENT — PAIN DESCRIPTION - LOCATION
LOCATION: GENERALIZED
LOCATION: GENERALIZED

## 2025-05-18 ASSESSMENT — PAIN DESCRIPTION - DESCRIPTORS: DESCRIPTORS: ACHING

## 2025-05-18 ASSESSMENT — PAIN - FUNCTIONAL ASSESSMENT: PAIN_FUNCTIONAL_ASSESSMENT: 0-10

## 2025-05-18 ASSESSMENT — PAIN SCALES - GENERAL
PAINLEVEL_OUTOF10: 10
PAINLEVEL_OUTOF10: 2

## 2025-05-18 NOTE — ED NOTES
Patient mobility status  with mild difficulty.     I have reviewed discharge instructions with the patient.  The patient verbalized understanding.    Patient left ED via Discharge Method: wheelchair to Home with  son .    Opportunity for questions and clarification provided.     Patient given 1 scripts.            Zuniga, Davina, RN  05/18/25 [id number]

## 2025-05-18 NOTE — ED TRIAGE NOTES
Per ems called to home for generalized body aches intermittently over the last 7 days. Denies gi/gu complications. Denies fever/chills.    Resulted

## 2025-05-18 NOTE — ED PROVIDER NOTES
[facility] Emergency Department Provider Note                   PCP:                No primary care provider on file.               Age: 55 y.o.      Sex: female     MEDICAL DECISION MAKING  Complexity of Problems Addressed:   1 or more chronic illness with an exacerbation or progression    Data Reviewed and Analyzed:  Category 1:    I have reviewed outside records from an external source for any pertinent PMH, ED visits, primary care visits, specialist visits, labs, EKG, and/or radiologic studies.  I reviewed external records: provider visit note from outside specialist.     Category 2:       I independently ordered and reviewed the labs.    I considered ordering radiologic studies but did not because I did not feel it would  of this patient.     Prescription drug management performed.  Patient was discharged risks and benefits of hospitalization were considered.        Category 3:     Discussion of management or test interpretation:    MDM  Number of Diagnoses or Management Options  Acute sensory ataxic neuropathy  Diagnosis management comments: Patient with a history of MS presents for diffuse pain all over.  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  Patient was prescribed Cymbalta and Lyrica per their recommendations.  Patient medication records were reviewed and she is on the Cymbalta but the Lyrica has run out.  Patient CBC, BMP, magnesium, and CK were all unremarkable.  Her white count is mildly elevated but that is likely from her steroid use.  Patient was given Lyrica, Toradol, and Norco in the ED and her pain was controlled.  Patient was prescribed Lyrica and a few days of [place].  She was discharged home to follow-up with her neurologist.    Based on patient's symptoms, exam, and through evaluation, I do not suspect cerebrovascular accident, cerebral aneurysm, subarachnoid hemorrhage, epidural hematoma, subdural

## 2025-08-16 ENCOUNTER — HOSPITAL ENCOUNTER (EMERGENCY)
Age: 55
Discharge: HOME | End: 2025-08-16
Attending: STUDENT IN AN ORGANIZED HEALTH CARE EDUCATION/TRAINING PROGRAM
Payer: MEDICAID

## 2025-08-16 ENCOUNTER — APPOINTMENT (OUTPATIENT)
Dept: GENERAL RADIOLOGY | Age: 55
End: 2025-08-16
Payer: MEDICAID

## 2025-08-16 VITALS
DIASTOLIC BLOOD PRESSURE: 62 MMHG | HEART RATE: 75 BPM | WEIGHT: 195 LBS | RESPIRATION RATE: 16 BRPM | BODY MASS INDEX: 30.61 KG/M2 | HEIGHT: 67 IN | OXYGEN SATURATION: 98 % | SYSTOLIC BLOOD PRESSURE: 109 MMHG | TEMPERATURE: 98.7 F

## 2025-08-16 DIAGNOSIS — G62.9 NEUROPATHY: ICD-10-CM

## 2025-08-16 DIAGNOSIS — M79.10 MYALGIA: Primary | ICD-10-CM

## 2025-08-16 LAB
ALBUMIN SERPL-MCNC: 3.6 G/DL (ref 3.5–5)
ALBUMIN/GLOB SERPL: 1.3 (ref 1–1.9)
ALP SERPL-CCNC: 57 U/L (ref 35–104)
ALT SERPL-CCNC: 34 U/L (ref 8–45)
ANION GAP SERPL CALC-SCNC: 14 MMOL/L (ref 7–16)
AST SERPL-CCNC: 33 U/L (ref 15–37)
BASOPHILS # BLD: 0.03 K/UL (ref 0–0.2)
BASOPHILS NFR BLD: 0.3 % (ref 0–2)
BILIRUB SERPL-MCNC: 0.7 MG/DL (ref 0–1.2)
BILIRUB UR QL: NEGATIVE
BUN SERPL-MCNC: 15 MG/DL (ref 6–23)
CALCIUM SERPL-MCNC: 9.6 MG/DL (ref 8.8–10.2)
CHLORIDE SERPL-SCNC: 103 MMOL/L (ref 98–107)
CK SERPL-CCNC: 40 U/L (ref 21–215)
CO2 SERPL-SCNC: 23 MMOL/L (ref 20–29)
CREAT SERPL-MCNC: 0.69 MG/DL (ref 0.6–1.1)
DIFFERENTIAL METHOD BLD: ABNORMAL
EOSINOPHIL # BLD: 0.09 K/UL (ref 0–0.8)
EOSINOPHIL NFR BLD: 1 % (ref 0.5–7.8)
ERYTHROCYTE [DISTWIDTH] IN BLOOD BY AUTOMATED COUNT: 13.8 % (ref 11.9–14.6)
GLOBULIN SER CALC-MCNC: 2.8 G/DL (ref 2.3–3.5)
GLUCOSE SERPL-MCNC: 103 MG/DL (ref 70–99)
GLUCOSE UR QL STRIP.AUTO: NEGATIVE MG/DL
HCT VFR BLD AUTO: 44 % (ref 35.8–46.3)
HGB BLD-MCNC: 14.6 G/DL (ref 11.7–15.4)
IMM GRANULOCYTES # BLD AUTO: 0.1 K/UL (ref 0–0.5)
IMM GRANULOCYTES NFR BLD AUTO: 1.2 % (ref 0–5)
KETONES UR-MCNC: NEGATIVE MG/DL
LEUKOCYTE ESTERASE UR QL STRIP: NEGATIVE
LYMPHOCYTES # BLD: 1.51 K/UL (ref 0.5–4.6)
LYMPHOCYTES NFR BLD: 17.6 % (ref 13–44)
MAGNESIUM SERPL-MCNC: 2.2 MG/DL (ref 1.8–2.4)
MCH RBC QN AUTO: 27.5 PG (ref 26.1–32.9)
MCHC RBC AUTO-ENTMCNC: 33.2 G/DL (ref 31.4–35)
MCV RBC AUTO: 82.9 FL (ref 82–102)
MONOCYTES # BLD: 0.77 K/UL (ref 0.1–1.3)
MONOCYTES NFR BLD: 9 % (ref 4–12)
NEUTS SEG # BLD: 6.08 K/UL (ref 1.7–8.2)
NEUTS SEG NFR BLD: 70.9 % (ref 43–78)
NITRITE UR QL: NEGATIVE
NRBC # BLD: 0 K/UL (ref 0–0.2)
PH UR: 8 (ref 5–9)
PLATELET # BLD AUTO: 295 K/UL (ref 150–450)
PMV BLD AUTO: 10 FL (ref 9.4–12.3)
POTASSIUM SERPL-SCNC: 4.1 MMOL/L (ref 3.5–5.1)
PROT SERPL-MCNC: 6.4 G/DL (ref 6.3–8.2)
PROT UR QL: NEGATIVE MG/DL
RBC # BLD AUTO: 5.31 M/UL (ref 4.05–5.2)
RBC # UR STRIP: NEGATIVE
SERVICE CMNT-IMP: NORMAL
SODIUM SERPL-SCNC: 141 MMOL/L (ref 136–145)
SP GR UR: 1.01 (ref 1–1.02)
UROBILINOGEN UR QL: 0.2 EU/DL (ref 0.2–1)
WBC # BLD AUTO: 8.6 K/UL (ref 4.3–11.1)

## 2025-08-16 PROCEDURE — 80053 COMPREHEN METABOLIC PANEL: CPT

## 2025-08-16 PROCEDURE — 81003 URINALYSIS AUTO W/O SCOPE: CPT

## 2025-08-16 PROCEDURE — 71046 X-RAY EXAM CHEST 2 VIEWS: CPT

## 2025-08-16 PROCEDURE — 96374 THER/PROPH/DIAG INJ IV PUSH: CPT

## 2025-08-16 PROCEDURE — 83735 ASSAY OF MAGNESIUM: CPT

## 2025-08-16 PROCEDURE — 82550 ASSAY OF CK (CPK): CPT

## 2025-08-16 PROCEDURE — 6370000000 HC RX 637 (ALT 250 FOR IP): Performed by: STUDENT IN AN ORGANIZED HEALTH CARE EDUCATION/TRAINING PROGRAM

## 2025-08-16 PROCEDURE — 85025 COMPLETE CBC W/AUTO DIFF WBC: CPT

## 2025-08-16 PROCEDURE — 6360000002 HC RX W HCPCS: Performed by: STUDENT IN AN ORGANIZED HEALTH CARE EDUCATION/TRAINING PROGRAM

## 2025-08-16 PROCEDURE — 96375 TX/PRO/DX INJ NEW DRUG ADDON: CPT

## 2025-08-16 PROCEDURE — 99285 EMERGENCY DEPT VISIT HI MDM: CPT

## 2025-08-16 PROCEDURE — 2580000003 HC RX 258: Performed by: STUDENT IN AN ORGANIZED HEALTH CARE EDUCATION/TRAINING PROGRAM

## 2025-08-16 RX ORDER — PREDNISONE 20 MG/1
40 TABLET ORAL DAILY
Qty: 10 TABLET | Refills: 0 | Status: SHIPPED | OUTPATIENT
Start: 2025-08-16 | End: 2025-08-21

## 2025-08-16 RX ORDER — HYDROCODONE BITARTRATE AND ACETAMINOPHEN 7.5; 325 MG/1; MG/1
1 TABLET ORAL
Refills: 0 | Status: COMPLETED | OUTPATIENT
Start: 2025-08-16 | End: 2025-08-16

## 2025-08-16 RX ORDER — DEXAMETHASONE SODIUM PHOSPHATE 10 MG/ML
10 INJECTION, SOLUTION INTRA-ARTICULAR; INTRALESIONAL; INTRAMUSCULAR; INTRAVENOUS; SOFT TISSUE ONCE
Status: COMPLETED | OUTPATIENT
Start: 2025-08-16 | End: 2025-08-16

## 2025-08-16 RX ORDER — KETOROLAC TROMETHAMINE 15 MG/ML
15 INJECTION, SOLUTION INTRAMUSCULAR; INTRAVENOUS ONCE
Status: COMPLETED | OUTPATIENT
Start: 2025-08-16 | End: 2025-08-16

## 2025-08-16 RX ORDER — PREGABALIN 100 MG/1
100 CAPSULE ORAL 3 TIMES DAILY
Qty: 90 CAPSULE | Refills: 0 | Status: SHIPPED | OUTPATIENT
Start: 2025-08-16 | End: 2025-09-15

## 2025-08-16 RX ORDER — 0.9 % SODIUM CHLORIDE 0.9 %
500 INTRAVENOUS SOLUTION INTRAVENOUS
Status: COMPLETED | OUTPATIENT
Start: 2025-08-16 | End: 2025-08-16

## 2025-08-16 RX ADMIN — HYDROCODONE BITARTRATE AND ACETAMINOPHEN 1 TABLET: 7.5; 325 TABLET ORAL at 14:43

## 2025-08-16 RX ADMIN — KETOROLAC TROMETHAMINE 15 MG: 15 INJECTION, SOLUTION INTRAMUSCULAR; INTRAVENOUS at 13:33

## 2025-08-16 RX ADMIN — DEXAMETHASONE SODIUM PHOSPHATE 10 MG: 10 INJECTION INTRAMUSCULAR; INTRAVENOUS at 14:44

## 2025-08-16 RX ADMIN — SODIUM CHLORIDE 500 ML: 0.9 INJECTION, SOLUTION INTRAVENOUS at 13:36

## 2025-08-16 ASSESSMENT — PAIN SCALES - GENERAL
PAINLEVEL_OUTOF10: 10
PAINLEVEL_OUTOF10: 10

## 2025-08-16 ASSESSMENT — PAIN - FUNCTIONAL ASSESSMENT: PAIN_FUNCTIONAL_ASSESSMENT: 0-10

## 2025-08-16 ASSESSMENT — PAIN DESCRIPTION - LOCATION
LOCATION: GENERALIZED
LOCATION: GENERALIZED

## 2025-08-16 ASSESSMENT — PAIN DESCRIPTION - DESCRIPTORS: DESCRIPTORS: ACHING

## 2025-08-30 PROBLEM — M54.2 NECK PAIN: Status: ACTIVE | Noted: 2025-08-30

## 2025-09-02 ENCOUNTER — ANESTHESIA EVENT (OUTPATIENT)
Dept: SURGERY | Age: 55
End: 2025-09-02
Payer: MEDICAID

## 2025-09-02 DIAGNOSIS — M48.02 CERVICAL STENOSIS OF SPINE: ICD-10-CM

## 2025-09-02 PROBLEM — G95.20 CORD COMPRESSION MYELOPATHY (HCC): Status: ACTIVE | Noted: 2025-09-02

## 2025-09-03 ENCOUNTER — ANESTHESIA (OUTPATIENT)
Dept: SURGERY | Age: 55
End: 2025-09-03
Payer: MEDICAID

## 2025-09-03 PROBLEM — R26.9 ABNORMALITY OF GAIT AND MOBILITY: Status: ACTIVE | Noted: 2025-09-03

## 2025-09-03 PROCEDURE — 6360000002 HC RX W HCPCS: Performed by: NURSE ANESTHETIST, CERTIFIED REGISTERED

## 2025-09-03 PROCEDURE — 6360000002 HC RX W HCPCS: Performed by: ORTHOPAEDIC SURGERY

## 2025-09-03 PROCEDURE — 2500000003 HC RX 250 WO HCPCS: Performed by: NURSE ANESTHETIST, CERTIFIED REGISTERED

## 2025-09-03 PROCEDURE — 2580000003 HC RX 258: Performed by: ANESTHESIOLOGY

## 2025-09-03 RX ORDER — PROPOFOL 10 MG/ML
INJECTION, EMULSION INTRAVENOUS
Status: DISCONTINUED | OUTPATIENT
Start: 2025-09-03 | End: 2025-09-03 | Stop reason: SDUPTHER

## 2025-09-03 RX ORDER — KETAMINE HCL IN NACL, ISO-OSM 20 MG/2 ML
SYRINGE (ML) INJECTION
Status: DISCONTINUED | OUTPATIENT
Start: 2025-09-03 | End: 2025-09-03 | Stop reason: SDUPTHER

## 2025-09-03 RX ORDER — DEXAMETHASONE SODIUM PHOSPHATE 10 MG/ML
INJECTION, SOLUTION INTRA-ARTICULAR; INTRALESIONAL; INTRAMUSCULAR; INTRAVENOUS; SOFT TISSUE
Status: DISCONTINUED | OUTPATIENT
Start: 2025-09-03 | End: 2025-09-03 | Stop reason: SDUPTHER

## 2025-09-03 RX ORDER — MIDAZOLAM HYDROCHLORIDE 1 MG/ML
INJECTION, SOLUTION INTRAMUSCULAR; INTRAVENOUS
Status: DISCONTINUED | OUTPATIENT
Start: 2025-09-03 | End: 2025-09-03 | Stop reason: SDUPTHER

## 2025-09-03 RX ORDER — ROCURONIUM BROMIDE 10 MG/ML
INJECTION, SOLUTION INTRAVENOUS
Status: DISCONTINUED | OUTPATIENT
Start: 2025-09-03 | End: 2025-09-03 | Stop reason: SDUPTHER

## 2025-09-03 RX ORDER — PHENYLEPHRINE HYDROCHLORIDE 10 MG/ML
INJECTION INTRAVENOUS
Status: DISCONTINUED | OUTPATIENT
Start: 2025-09-03 | End: 2025-09-03 | Stop reason: SDUPTHER

## 2025-09-03 RX ORDER — TRANEXAMIC ACID 100 MG/ML
INJECTION, SOLUTION INTRAVENOUS
Status: DISCONTINUED | OUTPATIENT
Start: 2025-09-03 | End: 2025-09-03 | Stop reason: SDUPTHER

## 2025-09-03 RX ORDER — LIDOCAINE HYDROCHLORIDE 20 MG/ML
INJECTION, SOLUTION EPIDURAL; INFILTRATION; INTRACAUDAL; PERINEURAL
Status: DISCONTINUED | OUTPATIENT
Start: 2025-09-03 | End: 2025-09-03 | Stop reason: SDUPTHER

## 2025-09-03 RX ORDER — CEFAZOLIN SODIUM 1 G/3ML
INJECTION, POWDER, FOR SOLUTION INTRAMUSCULAR; INTRAVENOUS
Status: DISCONTINUED | OUTPATIENT
Start: 2025-09-03 | End: 2025-09-03 | Stop reason: SDUPTHER

## 2025-09-03 RX ADMIN — TRANEXAMIC ACID 1000 MG: 100 INJECTION, SOLUTION INTRAVENOUS at 15:52

## 2025-09-03 RX ADMIN — CEFAZOLIN SODIUM 2 G: 1 INJECTION, POWDER, FOR SOLUTION INTRAMUSCULAR; INTRAVENOUS at 15:23

## 2025-09-03 RX ADMIN — PHENYLEPHRINE HYDROCHLORIDE 100 MCG: 10 INJECTION INTRAVENOUS at 16:50

## 2025-09-03 RX ADMIN — HYDROMORPHONE HYDROCHLORIDE 0.5 MG: 0.5 INJECTION, SOLUTION INTRAMUSCULAR; INTRAVENOUS; SUBCUTANEOUS at 15:15

## 2025-09-03 RX ADMIN — Medication 10 MG: at 16:37

## 2025-09-03 RX ADMIN — LIDOCAINE HYDROCHLORIDE 80 MG: 20 INJECTION, SOLUTION EPIDURAL; INFILTRATION; INTRACAUDAL; PERINEURAL at 15:15

## 2025-09-03 RX ADMIN — PHENYLEPHRINE HYDROCHLORIDE 150 MCG: 10 INJECTION INTRAVENOUS at 16:16

## 2025-09-03 RX ADMIN — ROCURONIUM BROMIDE 20 MG: 10 INJECTION, SOLUTION INTRAVENOUS at 16:52

## 2025-09-03 RX ADMIN — MIDAZOLAM 2 MG: 1 INJECTION INTRAMUSCULAR; INTRAVENOUS at 15:00

## 2025-09-03 RX ADMIN — TRANEXAMIC ACID 1000 MG: 100 INJECTION, SOLUTION INTRAVENOUS at 17:31

## 2025-09-03 RX ADMIN — SUGAMMADEX 200 MG: 100 INJECTION, SOLUTION INTRAVENOUS at 17:31

## 2025-09-03 RX ADMIN — Medication 10 MG: at 15:55

## 2025-09-03 RX ADMIN — HYDROMORPHONE HYDROCHLORIDE 0.5 MG: 0.5 INJECTION, SOLUTION INTRAMUSCULAR; INTRAVENOUS; SUBCUTANEOUS at 15:46

## 2025-09-03 RX ADMIN — Medication 2000 MG: at 15:24

## 2025-09-03 RX ADMIN — SODIUM CHLORIDE, SODIUM LACTATE, POTASSIUM CHLORIDE, AND CALCIUM CHLORIDE: 600; 310; 30; 20 INJECTION, SOLUTION INTRAVENOUS at 17:09

## 2025-09-03 RX ADMIN — PROPOFOL 150 MG: 10 INJECTION, EMULSION INTRAVENOUS at 15:15

## 2025-09-03 RX ADMIN — DEXAMETHASONE SODIUM PHOSPHATE 10 MG: 10 INJECTION INTRAMUSCULAR; INTRAVENOUS at 15:24

## 2025-09-03 RX ADMIN — ROCURONIUM BROMIDE 50 MG: 10 INJECTION, SOLUTION INTRAVENOUS at 15:16

## 2025-09-05 PROBLEM — Z78.9 DECREASED ACTIVITIES OF DAILY LIVING (ADL): Status: ACTIVE | Noted: 2025-09-05

## 2025-09-05 PROBLEM — Z51.89 ENCOUNTER FOR OTHER SPECIFIED AFTERCARE: Status: ACTIVE | Noted: 2025-09-05

## (undated) DEVICE — RESERVOIR,SUCTION,100CC,SILICONE: Brand: MEDLINE

## (undated) DEVICE — 3M™ TEGADERM™ TRANSPARENT FILM DRESSING FRAME STYLE, 1626W, 4 IN X 4-3/4 IN (10 CM X 12 CM), 50/CT 4CT/CASE: Brand: 3M™ TEGADERM™

## (undated) DEVICE — SUTURE ETHILON SZ 2-0 L18IN NONABSORBABLE BLK L26MM PS 3/8 585H

## (undated) DEVICE — TRAY,URINE METER,100% SILICONE,16FR10ML: Brand: MEDLINE

## (undated) DEVICE — AGENT HEMOSTATIC SURGIFLOW MATRIX KIT W/THROMBIN

## (undated) DEVICE — ABSORBENT, WATERPROOF, BACTERIA PROOF FILM DRESSING: Brand: OPSITE POST OP 20X10CM CTN 20

## (undated) DEVICE — SOLUTION IRRIG 1000ML 0.9% SOD CHL USP POUR PLAS BTL

## (undated) DEVICE — 2.0MM NEURO (MATCH HEAD) SOFT TOUCH

## (undated) DEVICE — BIPOLAR SEALER 23-112-1 AQM 6.0: Brand: AQUAMANTYS ®

## (undated) DEVICE — POSTERIOR LAMI VANPLT-LUCAS: Brand: MEDLINE INDUSTRIES, INC.

## (undated) DEVICE — CARBIDE MATCH HEAD

## (undated) DEVICE — SUTURE VICRYL SZ 3-0 L18IN ABSRB UD L26MM SH 1/2 CIR J864D

## (undated) DEVICE — Device

## (undated) DEVICE — SUTURE ABSORBABLE L 18 IN SZ 2-0 NDL L 26 MM TRICLOSAN

## (undated) DEVICE — SUTURE PDS II SZ 0 L18IN ABSRB VLT L36MM CT-1 1/2 CIR Z740D

## (undated) DEVICE — DRAIN SURG FLAT W7MMXL20CM FULL PERF

## (undated) DEVICE — SYSTEM SKIN CLSR 22CM DERMBND PRINEO

## (undated) DEVICE — SUTURE MONOCRYL STRATAFIX SPRL + SZ 4-0 L12IN ABSRB UD PS-2 SXMP1B117

## (undated) DEVICE — ABSORBENT, WATERPROOF, BACTERIA PROOF FILM DRESSING: Brand: OPSITE POST OP 9.5X8.5CM CTN 20

## (undated) DEVICE — UNIVERSAL DRAPES: Brand: MEDLINE INDUSTRIES, INC.

## (undated) DEVICE — BIT DRL 3.5 MM TAPR DISP

## (undated) DEVICE — GLOVE SURG SZ 85 L12IN FNGR THK79MIL GRN LTX FREE

## (undated) DEVICE — STERILE POLYISOPRENE POWDER-FREE SURGICAL GLOVES WITH EMOLLIENT COATING: Brand: PROTEXIS

## (undated) DEVICE — FORCEPS BPLR L210MM TIP L1.5 WRK L105MM STR BAYNT INSUL DISP

## (undated) DEVICE — BIT DRLL SPNL 2.3MM